# Patient Record
Sex: FEMALE | Race: WHITE | NOT HISPANIC OR LATINO | Employment: OTHER | URBAN - METROPOLITAN AREA
[De-identification: names, ages, dates, MRNs, and addresses within clinical notes are randomized per-mention and may not be internally consistent; named-entity substitution may affect disease eponyms.]

---

## 2019-11-09 ENCOUNTER — HOSPITAL ENCOUNTER (INPATIENT)
Facility: CLINIC | Age: 84
LOS: 3 days | Discharge: HOME | DRG: 193 | End: 2019-11-12
Attending: EMERGENCY MEDICINE | Admitting: INTERNAL MEDICINE
Payer: COMMERCIAL

## 2019-11-09 ENCOUNTER — APPOINTMENT (OUTPATIENT)
Dept: CT IMAGING | Facility: CLINIC | Age: 84
DRG: 193 | End: 2019-11-09
Attending: EMERGENCY MEDICINE
Payer: COMMERCIAL

## 2019-11-09 ENCOUNTER — APPOINTMENT (OUTPATIENT)
Dept: GENERAL RADIOLOGY | Facility: CLINIC | Age: 84
DRG: 193 | End: 2019-11-09
Attending: EMERGENCY MEDICINE
Payer: COMMERCIAL

## 2019-11-09 DIAGNOSIS — J18.9 PNEUMONIA OF LEFT LOWER LOBE DUE TO INFECTIOUS ORGANISM: ICD-10-CM

## 2019-11-09 DIAGNOSIS — N18.9 ACUTE RENAL FAILURE SUPERIMPOSED ON CHRONIC KIDNEY DISEASE, UNSPECIFIED CKD STAGE, UNSPECIFIED ACUTE RENAL FAILURE TYPE: ICD-10-CM

## 2019-11-09 DIAGNOSIS — E87.1 HYPONATREMIA: ICD-10-CM

## 2019-11-09 DIAGNOSIS — R19.7 DIARRHEA, UNSPECIFIED TYPE: ICD-10-CM

## 2019-11-09 DIAGNOSIS — N17.9 ACUTE RENAL FAILURE SUPERIMPOSED ON CHRONIC KIDNEY DISEASE, UNSPECIFIED CKD STAGE, UNSPECIFIED ACUTE RENAL FAILURE TYPE: ICD-10-CM

## 2019-11-09 DIAGNOSIS — J44.1 COPD EXACERBATION (H): Primary | ICD-10-CM

## 2019-11-09 DIAGNOSIS — R11.0 NAUSEA: ICD-10-CM

## 2019-11-09 LAB
ANION GAP SERPL CALCULATED.3IONS-SCNC: 8 MMOL/L (ref 3–14)
BASOPHILS # BLD AUTO: 0 10E9/L (ref 0–0.2)
BASOPHILS NFR BLD AUTO: 0.2 %
BUN SERPL-MCNC: 30 MG/DL (ref 7–30)
CALCIUM SERPL-MCNC: 7.7 MG/DL (ref 8.5–10.1)
CHLORIDE SERPL-SCNC: 94 MMOL/L (ref 94–109)
CO2 SERPL-SCNC: 24 MMOL/L (ref 20–32)
CREAT SERPL-MCNC: 1.95 MG/DL (ref 0.52–1.04)
DIFFERENTIAL METHOD BLD: ABNORMAL
EOSINOPHIL # BLD AUTO: 0 10E9/L (ref 0–0.7)
EOSINOPHIL NFR BLD AUTO: 0.2 %
ERYTHROCYTE [DISTWIDTH] IN BLOOD BY AUTOMATED COUNT: 13.4 % (ref 10–15)
GFR SERPL CREATININE-BSD FRML MDRD: 22 ML/MIN/{1.73_M2}
GLUCOSE SERPL-MCNC: 113 MG/DL (ref 70–99)
HCT VFR BLD AUTO: 27.6 % (ref 35–47)
HGB BLD-MCNC: 9.4 G/DL (ref 11.7–15.7)
IMM GRANULOCYTES # BLD: 0.1 10E9/L (ref 0–0.4)
IMM GRANULOCYTES NFR BLD: 0.6 %
LYMPHOCYTES # BLD AUTO: 0.6 10E9/L (ref 0.8–5.3)
LYMPHOCYTES NFR BLD AUTO: 3.3 %
MCH RBC QN AUTO: 30.4 PG (ref 26.5–33)
MCHC RBC AUTO-ENTMCNC: 34.1 G/DL (ref 31.5–36.5)
MCV RBC AUTO: 89 FL (ref 78–100)
MONOCYTES # BLD AUTO: 1.2 10E9/L (ref 0–1.3)
MONOCYTES NFR BLD AUTO: 6.7 %
NEUTROPHILS # BLD AUTO: 16 10E9/L (ref 1.6–8.3)
NEUTROPHILS NFR BLD AUTO: 89 %
NRBC # BLD AUTO: 0 10*3/UL
NRBC BLD AUTO-RTO: 0 /100
PLATELET # BLD AUTO: 96 10E9/L (ref 150–450)
POTASSIUM SERPL-SCNC: 4 MMOL/L (ref 3.4–5.3)
RBC # BLD AUTO: 3.09 10E12/L (ref 3.8–5.2)
SODIUM SERPL-SCNC: 126 MMOL/L (ref 133–144)
WBC # BLD AUTO: 18 10E9/L (ref 4–11)

## 2019-11-09 PROCEDURE — 25000128 H RX IP 250 OP 636: Performed by: INTERNAL MEDICINE

## 2019-11-09 PROCEDURE — 40000275 ZZH STATISTIC RCP TIME EA 10 MIN

## 2019-11-09 PROCEDURE — 25000128 H RX IP 250 OP 636: Performed by: EMERGENCY MEDICINE

## 2019-11-09 PROCEDURE — 25000125 ZZHC RX 250: Performed by: INTERNAL MEDICINE

## 2019-11-09 PROCEDURE — 71250 CT THORAX DX C-: CPT

## 2019-11-09 PROCEDURE — 99223 1ST HOSP IP/OBS HIGH 75: CPT | Mod: AI | Performed by: INTERNAL MEDICINE

## 2019-11-09 PROCEDURE — 25000125 ZZHC RX 250: Performed by: EMERGENCY MEDICINE

## 2019-11-09 PROCEDURE — 99285 EMERGENCY DEPT VISIT HI MDM: CPT | Mod: 25

## 2019-11-09 PROCEDURE — 96365 THER/PROPH/DIAG IV INF INIT: CPT

## 2019-11-09 PROCEDURE — 25800030 ZZH RX IP 258 OP 636: Performed by: EMERGENCY MEDICINE

## 2019-11-09 PROCEDURE — 40000274 ZZH STATISTIC RCP CONSULT EA 30 MIN

## 2019-11-09 PROCEDURE — 96375 TX/PRO/DX INJ NEW DRUG ADDON: CPT

## 2019-11-09 PROCEDURE — 94640 AIRWAY INHALATION TREATMENT: CPT | Mod: 76

## 2019-11-09 PROCEDURE — 85025 COMPLETE CBC W/AUTO DIFF WBC: CPT | Performed by: EMERGENCY MEDICINE

## 2019-11-09 PROCEDURE — 25000132 ZZH RX MED GY IP 250 OP 250 PS 637: Performed by: EMERGENCY MEDICINE

## 2019-11-09 PROCEDURE — 36415 COLL VENOUS BLD VENIPUNCTURE: CPT | Performed by: EMERGENCY MEDICINE

## 2019-11-09 PROCEDURE — 94640 AIRWAY INHALATION TREATMENT: CPT

## 2019-11-09 PROCEDURE — 25000132 ZZH RX MED GY IP 250 OP 250 PS 637: Performed by: INTERNAL MEDICINE

## 2019-11-09 PROCEDURE — 71046 X-RAY EXAM CHEST 2 VIEWS: CPT

## 2019-11-09 PROCEDURE — 25800030 ZZH RX IP 258 OP 636: Performed by: INTERNAL MEDICINE

## 2019-11-09 PROCEDURE — 96361 HYDRATE IV INFUSION ADD-ON: CPT

## 2019-11-09 PROCEDURE — 80048 BASIC METABOLIC PNL TOTAL CA: CPT | Performed by: EMERGENCY MEDICINE

## 2019-11-09 PROCEDURE — 87040 BLOOD CULTURE FOR BACTERIA: CPT | Performed by: EMERGENCY MEDICINE

## 2019-11-09 PROCEDURE — 12000000 ZZH R&B MED SURG/OB

## 2019-11-09 RX ORDER — METHYLPREDNISOLONE SODIUM SUCCINATE 40 MG/ML
40 INJECTION, POWDER, LYOPHILIZED, FOR SOLUTION INTRAMUSCULAR; INTRAVENOUS EVERY 8 HOURS
Status: COMPLETED | OUTPATIENT
Start: 2019-11-09 | End: 2019-11-10

## 2019-11-09 RX ORDER — ATORVASTATIN CALCIUM 40 MG/1
40 TABLET, FILM COATED ORAL DAILY
Status: ON HOLD | COMMUNITY
End: 2023-01-01

## 2019-11-09 RX ORDER — SERTRALINE HYDROCHLORIDE 100 MG/1
100 TABLET, FILM COATED ORAL DAILY
Status: DISCONTINUED | OUTPATIENT
Start: 2019-11-10 | End: 2019-11-12 | Stop reason: HOSPADM

## 2019-11-09 RX ORDER — AMLODIPINE BESYLATE 5 MG/1
5 TABLET ORAL DAILY
Status: DISCONTINUED | OUTPATIENT
Start: 2019-11-10 | End: 2019-11-12 | Stop reason: HOSPADM

## 2019-11-09 RX ORDER — LOSARTAN POTASSIUM 100 MG/1
100 TABLET ORAL DAILY
Status: DISCONTINUED | OUTPATIENT
Start: 2019-11-10 | End: 2019-11-12 | Stop reason: HOSPADM

## 2019-11-09 RX ORDER — LIDOCAINE 40 MG/G
CREAM TOPICAL
Status: DISCONTINUED | OUTPATIENT
Start: 2019-11-09 | End: 2019-11-12 | Stop reason: HOSPADM

## 2019-11-09 RX ORDER — LACTOBACILLUS RHAMNOSUS GG 10B CELL
1 CAPSULE ORAL 2 TIMES DAILY
Status: DISCONTINUED | OUTPATIENT
Start: 2019-11-09 | End: 2019-11-12 | Stop reason: HOSPADM

## 2019-11-09 RX ORDER — ONDANSETRON 2 MG/ML
4 INJECTION INTRAMUSCULAR; INTRAVENOUS ONCE
Status: COMPLETED | OUTPATIENT
Start: 2019-11-09 | End: 2019-11-09

## 2019-11-09 RX ORDER — IPRATROPIUM BROMIDE AND ALBUTEROL SULFATE 2.5; .5 MG/3ML; MG/3ML
3 SOLUTION RESPIRATORY (INHALATION)
Status: DISCONTINUED | OUTPATIENT
Start: 2019-11-09 | End: 2019-11-12 | Stop reason: HOSPADM

## 2019-11-09 RX ORDER — CEFTRIAXONE 1 G/1
1 INJECTION, POWDER, FOR SOLUTION INTRAMUSCULAR; INTRAVENOUS EVERY 24 HOURS
Status: DISCONTINUED | OUTPATIENT
Start: 2019-11-10 | End: 2019-11-12 | Stop reason: HOSPADM

## 2019-11-09 RX ORDER — BUDESONIDE 0.5 MG/2ML
0.5 INHALANT ORAL EVERY 12 HOURS
Status: ON HOLD | COMMUNITY
End: 2019-11-12

## 2019-11-09 RX ORDER — CARVEDILOL 25 MG/1
25 TABLET ORAL 2 TIMES DAILY WITH MEALS
Status: DISCONTINUED | OUTPATIENT
Start: 2019-11-09 | End: 2019-11-12 | Stop reason: HOSPADM

## 2019-11-09 RX ORDER — PANTOPRAZOLE SODIUM 40 MG/1
40 TABLET, DELAYED RELEASE ORAL 2 TIMES DAILY
Status: DISCONTINUED | OUTPATIENT
Start: 2019-11-09 | End: 2019-11-12 | Stop reason: HOSPADM

## 2019-11-09 RX ORDER — GABAPENTIN 100 MG/1
100 CAPSULE ORAL 4 TIMES DAILY
Status: DISCONTINUED | OUTPATIENT
Start: 2019-11-09 | End: 2019-11-12 | Stop reason: HOSPADM

## 2019-11-09 RX ORDER — IPRATROPIUM BROMIDE AND ALBUTEROL SULFATE 2.5; .5 MG/3ML; MG/3ML
3 SOLUTION RESPIRATORY (INHALATION) ONCE
Status: COMPLETED | OUTPATIENT
Start: 2019-11-09 | End: 2019-11-09

## 2019-11-09 RX ORDER — SODIUM CHLORIDE 9 MG/ML
INJECTION, SOLUTION INTRAVENOUS CONTINUOUS
Status: DISCONTINUED | OUTPATIENT
Start: 2019-11-09 | End: 2019-11-10

## 2019-11-09 RX ORDER — ONDANSETRON 4 MG/1
4 TABLET, ORALLY DISINTEGRATING ORAL EVERY 6 HOURS PRN
Status: DISCONTINUED | OUTPATIENT
Start: 2019-11-09 | End: 2019-11-12 | Stop reason: HOSPADM

## 2019-11-09 RX ORDER — ATORVASTATIN CALCIUM 40 MG/1
40 TABLET, FILM COATED ORAL DAILY
Status: DISCONTINUED | OUTPATIENT
Start: 2019-11-10 | End: 2019-11-12 | Stop reason: HOSPADM

## 2019-11-09 RX ORDER — AMLODIPINE BESYLATE 5 MG/1
5 TABLET ORAL DAILY
Status: ON HOLD | COMMUNITY
End: 2022-04-23

## 2019-11-09 RX ORDER — ACETAMINOPHEN 325 MG/1
650 TABLET ORAL ONCE
Status: COMPLETED | OUTPATIENT
Start: 2019-11-09 | End: 2019-11-09

## 2019-11-09 RX ORDER — IBUPROFEN 600 MG/1
600 TABLET, FILM COATED ORAL EVERY 6 HOURS PRN
Status: DISCONTINUED | OUTPATIENT
Start: 2019-11-09 | End: 2019-11-12 | Stop reason: HOSPADM

## 2019-11-09 RX ORDER — NALOXONE HYDROCHLORIDE 0.4 MG/ML
.1-.4 INJECTION, SOLUTION INTRAMUSCULAR; INTRAVENOUS; SUBCUTANEOUS
Status: DISCONTINUED | OUTPATIENT
Start: 2019-11-09 | End: 2019-11-12 | Stop reason: HOSPADM

## 2019-11-09 RX ORDER — CEFTRIAXONE 2 G/1
2 INJECTION, POWDER, FOR SOLUTION INTRAMUSCULAR; INTRAVENOUS ONCE
Status: COMPLETED | OUTPATIENT
Start: 2019-11-09 | End: 2019-11-09

## 2019-11-09 RX ORDER — ASPIRIN 81 MG/1
81 TABLET ORAL DAILY
Status: DISCONTINUED | OUTPATIENT
Start: 2019-11-10 | End: 2019-11-12 | Stop reason: HOSPADM

## 2019-11-09 RX ORDER — ALBUTEROL SULFATE 90 UG/1
2 AEROSOL, METERED RESPIRATORY (INHALATION) EVERY 6 HOURS
COMMUNITY

## 2019-11-09 RX ORDER — ONDANSETRON 2 MG/ML
4 INJECTION INTRAMUSCULAR; INTRAVENOUS EVERY 6 HOURS PRN
Status: DISCONTINUED | OUTPATIENT
Start: 2019-11-09 | End: 2019-11-12 | Stop reason: HOSPADM

## 2019-11-09 RX ORDER — LANOLIN ALCOHOL/MO/W.PET/CERES
400 CREAM (GRAM) TOPICAL DAILY
Status: DISCONTINUED | OUTPATIENT
Start: 2019-11-09 | End: 2019-11-12 | Stop reason: HOSPADM

## 2019-11-09 RX ORDER — ACETAMINOPHEN 325 MG/1
650 TABLET ORAL EVERY 4 HOURS PRN
Status: DISCONTINUED | OUTPATIENT
Start: 2019-11-09 | End: 2019-11-12 | Stop reason: HOSPADM

## 2019-11-09 RX ADMIN — Medication 1 CAPSULE: at 21:12

## 2019-11-09 RX ADMIN — CEFTRIAXONE 2 G: 2 INJECTION, POWDER, FOR SOLUTION INTRAMUSCULAR; INTRAVENOUS at 13:39

## 2019-11-09 RX ADMIN — ACETAMINOPHEN 650 MG: 325 TABLET, FILM COATED ORAL at 14:25

## 2019-11-09 RX ADMIN — FOLIC ACID TAB 400 MCG 400 MCG: 400 TAB at 17:02

## 2019-11-09 RX ADMIN — METHYLPREDNISOLONE SODIUM SUCCINATE 40 MG: 40 INJECTION, POWDER, FOR SOLUTION INTRAMUSCULAR; INTRAVENOUS at 23:56

## 2019-11-09 RX ADMIN — ACETAMINOPHEN 650 MG: 325 TABLET, FILM COATED ORAL at 21:12

## 2019-11-09 RX ADMIN — METHYLPREDNISOLONE SODIUM SUCCINATE 40 MG: 40 INJECTION, POWDER, FOR SOLUTION INTRAMUSCULAR; INTRAVENOUS at 17:03

## 2019-11-09 RX ADMIN — PANTOPRAZOLE SODIUM 40 MG: 40 TABLET, DELAYED RELEASE ORAL at 21:12

## 2019-11-09 RX ADMIN — GABAPENTIN 100 MG: 100 CAPSULE ORAL at 18:04

## 2019-11-09 RX ADMIN — SODIUM CHLORIDE 500 ML: 9 INJECTION, SOLUTION INTRAVENOUS at 10:56

## 2019-11-09 RX ADMIN — ONDANSETRON HYDROCHLORIDE 4 MG: 2 INJECTION, SOLUTION INTRAMUSCULAR; INTRAVENOUS at 14:25

## 2019-11-09 RX ADMIN — SODIUM CHLORIDE: 9 INJECTION, SOLUTION INTRAVENOUS at 16:06

## 2019-11-09 RX ADMIN — GABAPENTIN 100 MG: 100 CAPSULE ORAL at 21:12

## 2019-11-09 RX ADMIN — IPRATROPIUM BROMIDE AND ALBUTEROL SULFATE 3 ML: .5; 3 SOLUTION RESPIRATORY (INHALATION) at 16:18

## 2019-11-09 RX ADMIN — IPRATROPIUM BROMIDE AND ALBUTEROL SULFATE 3 ML: .5; 3 SOLUTION RESPIRATORY (INHALATION) at 19:32

## 2019-11-09 RX ADMIN — CARVEDILOL 25 MG: 25 TABLET, FILM COATED ORAL at 18:05

## 2019-11-09 RX ADMIN — GUAIFENESIN 10 ML: 100 SOLUTION ORAL at 21:12

## 2019-11-09 RX ADMIN — IPRATROPIUM BROMIDE AND ALBUTEROL SULFATE 3 ML: .5; 3 SOLUTION RESPIRATORY (INHALATION) at 10:56

## 2019-11-09 RX ADMIN — AZITHROMYCIN MONOHYDRATE 500 MG: 500 INJECTION, POWDER, LYOPHILIZED, FOR SOLUTION INTRAVENOUS at 13:39

## 2019-11-09 ASSESSMENT — ACTIVITIES OF DAILY LIVING (ADL)
COGNITION: 0 - NO COGNITION ISSUES REPORTED
ADLS_ACUITY_SCORE: 11
TOILETING: 0-->INDEPENDENT
AMBULATION: 0-->INDEPENDENT
NUMBER_OF_TIMES_PATIENT_HAS_FALLEN_WITHIN_LAST_SIX_MONTHS: 2
TRANSFERRING: 0-->INDEPENDENT
SWALLOWING: 0-->SWALLOWS FOODS/LIQUIDS WITHOUT DIFFICULTY
DRESS: 0-->INDEPENDENT
RETIRED_COMMUNICATION: 0-->UNDERSTANDS/COMMUNICATES WITHOUT DIFFICULTY
ADLS_ACUITY_SCORE: 16
RETIRED_EATING: 0-->INDEPENDENT
FALL_HISTORY_WITHIN_LAST_SIX_MONTHS: YES
BATHING: 0-->INDEPENDENT

## 2019-11-09 ASSESSMENT — ENCOUNTER SYMPTOMS
NAUSEA: 1
VOMITING: 1
BLOOD IN STOOL: 0
DIARRHEA: 1
FEVER: 0

## 2019-11-09 ASSESSMENT — MIFFLIN-ST. JEOR: SCORE: 959.75

## 2019-11-09 NOTE — PHARMACY-ADMISSION MEDICATION HISTORY
Admission medication history interview status for this patient is complete. See [facility] admission navigator for allergy information, prior to admission medications and immunization status.     Medication history interview source(s):Patient + patient's daughter  Medication history resources (including written lists, pill bottles, clinic record): med list   Primary pharmacy: [facility] (also does pill-pack mail order)    Changes made to PTA medication list:  Added: albuterol, ellipta, calcium + vitamin d  Deleted: benzonatateiron, vitamin d  Changed: sertraline  Dose, losartan dose, gabapentin, atorvastatin, aspirin, amlodipine doses    Actions taken by pharmacist (provider contacted, etc): None    Additional medication history information: Patient had all morning meds but threw up afterward.     Medication reconciliation/reorder completed by provider prior to medication history?  No    Do you take OTC medications (eg tylenol, ibuprofen, fish oil, eye/ear drops, etc)? Yes     For patients on insulin therapy: No      Prior to Admission medications    Medication Sig Last Dose Taking? Auth Provider   albuterol (PROAIR HFA/PROVENTIL HFA/VENTOLIN HFA) 108 (90 Base) MCG/ACT inhaler Inhale 2 puffs into the lungs every 6 hours 11/8/2019 at Unknown time Yes Unknown, Entered By History   amLODIPine (NORVASC) 5 MG tablet Take 5 mg by mouth daily 11/9/2019 at am Yes Unknown, Entered By History   aspirin (ASA) 81 MG tablet Take 81 mg by mouth daily 11/9/2019 at am Yes Unknown, Entered By History   atorvastatin (LIPITOR) 40 MG tablet Take 40 mg by mouth daily 11/9/2019 at am Yes Unknown, Entered By History   budesonide (PULMICORT) 0.5 MG/2ML neb solution Take 0.5 mg by nebulization every 12 hours 11/8/2019 at Unknown time Yes Unknown, Entered By History   CALCIUM-VITAMIN D PO Take 1 tablet by mouth daily  at am Yes Unknown, Entered By History   carvedilol (COREG) 25 MG tablet Take 1 tablet (25 mg) by mouth 2 times daily  (with meals) 11/9/2019 at 1x Yes Wellington Pepper MD   fluticasone (ARNUITY ELLIPTA) 200 MCG/ACT inhaler Inhale 1 puff into the lungs daily 11/8/2019 at Unknown time Yes Unknown, Entered By History   folic acid (FOLVITE) 400 MCG tablet Take 400 mcg by mouth daily  11/8/2019 at Unknown time Yes Unknown, Entered By History   gabapentin (NEURONTIN) 100 MG capsule Take 100 mg by mouth 4 times daily  11/9/2019 at 1x Yes Reported, Patient   glucosamine-chondroitin 500-400 MG CAPS Take 1 capsule by mouth 2 times daily. 11/9/2019 at 1x Yes Unknown, Entered By History   losartan (COZAAR) 50 MG tablet Take 100 mg by mouth daily  11/9/2019 at am Yes Unknown, Entered By History   pantoprazole (PROTONIX) 40 MG enteric coated tablet Take 40 mg by mouth 2 times daily  11/8/2019 at 1x Yes Tamiko Fall MD   sertraline (ZOLOFT) 100 MG tablet Take 100 mg by mouth daily  11/9/2019 at am Yes Reported, Patient

## 2019-11-09 NOTE — ED NOTES
[facility]  ED Nurse Handoff Report    Inez Collier is a 87 year old female   ED Chief complaint: Nasal Congestion; Vomiting; and Diarrhea  . ED Diagnosis:   Final diagnoses:   Hyponatremia   Pneumonia of left lower lobe due to infectious organism (H)   Acute renal failure superimposed on chronic kidney disease, unspecified CKD stage, unspecified acute renal failure type (H)   Diarrhea, unspecified type     Allergies:   Allergies   Allergen Reactions     Lisinopril      Morphine Hcl        Code Status: Full Code  Activity level - Baseline/Home:  Independent. Activity Level - Current:   Stand by Assist. Lift room needed: No. Bariatric: No   Needed: No   Isolation: Yes. Infection: Not Applicable  Rule out c-diff.     Vital Signs:   Vitals:    11/09/19 1215 11/09/19 1230 11/09/19 1245 11/09/19 1315   BP: 120/63 127/72 135/62 135/76   Pulse: 75 74 73 75   Resp:       Temp:       TempSrc:       SpO2:       Weight:           Cardiac Rhythm:  ,      Pain level: 0-10 Pain Scale: 0  Patient confused: No. Patient Falls Risk: Yes.   Elimination Status: Has voided   Patient Report - Initial Complaint: Congestion, vomiting and diarrhea. Focused Assessment: HPI   Inez Collier is a 87 year old female, with a history of TIA, asthma, CKD, hypertension, and hyperlipidemia, who presents with her daughter to the ED for evaluation of nausea, vomiting, and diarrhea. The patient reports she developed bronchitis symptoms beginning in September (2 months ago). She has been coughing up yellow/green sputum since. She does have abdominal pain from the coughing. She was on 2 course of antibiotics, Azithromycin and Doxycycline. She finished her 10 day course of Doxycycline along with Prednisone approximately 2 weeks ago. Her symptoms did not improve with the antibiotics. She was evaluated both times at urgent care and has not follow-up with her PCP regarding her sinus/nasal congestion. The patient notes she only drank  water this morning and subsequently vomited. She has been having dry heaves since. She as well has been having nonbloody diarrhea over the past 5 days. She has about x3-5 stools per day, usually in the morning. The patient denies any fever, urinary symptoms, or other symptoms/complaints.     Tests Performed: labs/cultures/imaging. Abnormal Results:   Labs Ordered and Resulted from Time of ED Arrival Up to the Time of Departure from the ED   BASIC METABOLIC PANEL - Abnormal; Notable for the following components:       Result Value    Sodium 126 (*)     Glucose 113 (*)     Creatinine 1.95 (*)     GFR Estimate 22 (*)     GFR Estimate If Black 26 (*)     Calcium 7.7 (*)     All other components within normal limits   CBC WITH PLATELETS DIFFERENTIAL - Abnormal; Notable for the following components:    WBC 18.0 (*)     RBC Count 3.09 (*)     Hemoglobin 9.4 (*)     Hematocrit 27.6 (*)     Platelet Count 96 (*)     Absolute Neutrophil 16.0 (*)     Absolute Lymphocytes 0.6 (*)     All other components within normal limits   BLOOD CULTURE   BLOOD CULTURE   CLOSTRIDIUM DIFFICILE TOXIN B   CT CHEST WITHOUT CONTRAST  11/9/2019 1:08 PM  IMPRESSION:    1. Focal airspace consolidation in the left lower lobe concerning for  pneumonia.  2. Scattered tree-in-bud nodularity within the left upper and right  middle lobes suspicious for bronchiolitis.  3. Scattered small pulmonary nodules measuring up to 3 mm in the left  lower lobe. Follow guidelines below.  4. 1.1 cm hypoattenuating nodule in the right lobe of the thyroid  gland. Suggest follow-up exam with nonemergent thyroid ultrasound.  .   Treatments provided: IVF, IV ABX,   Family Comments: Daughter at bedside  OBS brochure/video discussed/provided to patient:  No  ED Medications:   Medications   azithromycin (ZITHROMAX) 500 mg in sodium chloride 0.9 % 250 mL intermittent infusion (500 mg Intravenous New Bag 11/9/19 7837)   0.9% sodium chloride BOLUS (0 mLs Intravenous Stopped  11/9/19 1340)   ipratropium - albuterol 0.5 mg/2.5 mg/3 mL (DUONEB) neb solution 3 mL (3 mLs Nebulization Given 11/9/19 1056)   cefTRIAXone (ROCEPHIN) 2 g vial to attach to  ml bag for ADULTS or NS 50 ml bag for PEDS (0 g Intravenous Stopped 11/9/19 1350)     Drips infusing:  Yes  For the majority of the shift, the patient's behavior Green. Interventions performed were n/a.     Severe Sepsis OR Septic Shock Diagnosis Present: No      ED Nurse Name/Phone Number: Ravinder Dobson RN,   2:08 PM  RECEIVING UNIT ED HANDOFF REVIEW    Above ED Nurse Handoff Report was reviewed: Yes  Reviewed by: Annika Bauman RN on November 9, 2019 at 2:57 PM

## 2019-11-09 NOTE — ED TRIAGE NOTES
"Patient reports sinus and nasal congestion since late September, started antibiotics then. She went back in and got another round of antibiotics and prednisone. Now with continued nasal and sinus congestion as well as nausea, vomiting once this morning and diarrhea since last Monday, usually just in the morning about 3-5 stools a day with some incontinence\". Also with cough with yellow/green sputum. History of asthma.   "

## 2019-11-09 NOTE — LETTER
Transition Communication Hand-off for Care Transitions to Next Level of Care Provider    Name: Inez Collier  : 1932  MRN #: [id number]  Primary Care Provider: JEWELL GALLAGHER  Primary Care MD Name: Dr. Jewell Gallagher  Primary Clinic: [facility] [address]   [place] [address]  Primary Care Clinic Name: [facility] [place]  Reason for Hospitalization:  Hyponatremia [E87.1]  Pneumonia of left lower lobe due to infectious organism (H) [J18.1]  Diarrhea, unspecified type [R19.7]  Acute renal failure superimposed on chronic kidney disease, unspecified CKD stage, unspecified acute renal failure type (H) [N17.9, N18.9]  Admit Date/Time: 2019 10:20 AM  Discharge Date:   Payor Source: Payor: HU[employer]NA / Plan: HU[employer] MEDICARE ADVANTAGE / Product Type: Medicare /     Readmission Assessment Measure (KENTON) Risk Score/category: Average         Reason for Communication Hand-off Referral: Admission diagnoses: PN  Admission diagnoses: COPD    Discharge Plan: home    Discharge Needs Assessment:  Needs      Most Recent Value   Anticipated Changes Related to Illness  none   # of Referrals Placed by CTS  External Care Coordination, Communication hand-offs to next level of Care Providers   Other Resources  Other (see comment) [COPD & PNA Action Plans discussed w/pt at bedside. ]        Follow-up plan:  No future appointments.        Key Recommendations:  Patient was a smoker but quit in . She follows with Dr. Best (Pulmonology thru [facility]). She knows to avoid sick contacts & watches for environmental factors that can exacerbate her COPD. Discussed the importance of completing the course of antibiotics after discharge.     Pt was dc'd home with new meds for zithromax, ceftin, prednisone, zofran and cough medicine.    She should follow up as scheduled with recommended BMP.    Kait Dobson, RN, BSN, CPHN, CM    AVS/Discharge Summary is the source of truth; this is a helpful guide for  improved communication of patient story

## 2019-11-09 NOTE — H&P
[facility]    History and Physical - Hospitalist Service       Date of Admission:  11/9/2019    Assessment & Plan   Inez Collier is a 87 year old female admitted on 11/9/2019. She presents with cough , chills, SOB, recurrent after being treated with Z pack and Doxycycline starting in September.   Patient has history of COPD. Former smoker. On inhaled steroids and bronchodilators. Found to have LLL infiltrate on CT scan of the lungs. Has leucocytosis and hyponatremia. Has had diarrheal stools. Pending assessment for C diff toxin. Admitted for antibiotic treatment and IVF.     1. LLL pneumonia - started on Ceftriaxone and Zithromax IV for CAP, pending blood cultures. Continue with antibiotic treatment.   Oxygen to maintain O2 sats over 92 %.  Symptomatic mucolytic and fluids     2. COPD exacerbation related to pneumonitis   Start on Duonebs, IV Methylprednisolone ,once tapered down restart inhaled steroids   insentive spirometry     3. Hyponatremia - related to pneumonia, dehydration, diarrhea  Start on IVF, monitor electrolytes     4. Diarrhea - recent antibiotics , concern for C diff, assess stool for C diff toxin, contact isolation, IVF    4. CKD - IVF, monitor     5. HTN- continue PTA treatment, BP is controlled     6. Hyperlipidemia - continue on statin        Diet: regular diet   DVT Prophylaxis: Pneumatic Compression Devices  Parson Catheter: not present  Code Status: Full     Disposition Plan   Expected discharge: 2 - 3 days, recommended to prior living arrangement once antibiotic plan established and hemoglobin stable.  Entered: Tera Mao MD 11/09/2019, 3:23 PM     The patient's care was discussed with the Patient and Patient's Family.    Tera Mao MD  [facility]    ______________________________________________________________________    Chief Complaint   Productive cough and wheezing, mild SOB, diarrhea.     History is obtained from the patient    History of  Present Illness   Inez Collier is a 87 year old female who presents with productive cough, for over a month. Has yellow green phlegm, feels chills on and off. Has mild SOB related to the cough.   Has been on 2 antibiotic courses - Z pack and Doxycycline. Symptoms have not improved. No chest pain. Able to eat and ambulate.   Has h/o COPD, remote smoking history. Has been on Albuterol and Prednisone. Uses inhaled steroids as well.   Past 2 weeks has developed diarrheal stools, 3-5 loose to water stools daily. Has history of colitis. Not on treatment currently. No nausea, vomiting or abdominal pain.   Feels fatigues, no fainting symptoms.   In ED lab work showed hyponatremia. Chest CT with LLL infiltrate and patient was started on IV Ceftriaxone and IV Zithromax , after cultures obtained.   I was asked to admit her for inpatient treatment.     Review of Systems    The 10 point Review of Systems is negative other than noted in the HPI .    Past Medical History    I have reviewed this patient's medical history and updated it with pertinent information if needed.   Past Medical History:   Diagnosis Date     Asthma      Chronic kidney disease      Colitis      Hypertension        Past Surgical History   I have reviewed this patient's surgical history and updated it with pertinent information if needed.  Past Surgical History:   Procedure Laterality Date     GYN SURGERY      hysterectomy       Social History   I have reviewed this patient's social history and updated it with pertinent information if needed.  Social History     Tobacco Use     Smoking status: Former Smoker     Last attempt to quit: 1972     Years since quittin.8   Substance Use Topics     Alcohol use: No     Drug use: No       Family History   I have reviewed this patient's family history and updated it with pertinent information if needed.   Family History   Problem Relation Age of Onset     Coronary Artery Disease Brother      Colon Cancer Brother       Breast Cancer Sister        Prior to Admission Medications   Prior to Admission Medications   Prescriptions Last Dose Informant Patient Reported? Taking?   CALCIUM-VITAMIN D PO  at am  Yes Yes   Sig: Take 1 tablet by mouth daily   albuterol (PROAIR HFA/PROVENTIL HFA/VENTOLIN HFA) 108 (90 Base) MCG/ACT inhaler 11/8/2019 at Unknown time  Yes Yes   Sig: Inhale 2 puffs into the lungs every 6 hours   amLODIPine (NORVASC) 5 MG tablet 11/9/2019 at am  Yes Yes   Sig: Take 5 mg by mouth daily   aspirin (ASA) 81 MG tablet 11/9/2019 at am  Yes Yes   Sig: Take 81 mg by mouth daily   atorvastatin (LIPITOR) 40 MG tablet 11/9/2019 at am  Yes Yes   Sig: Take 40 mg by mouth daily   budesonide (PULMICORT) 0.5 MG/2ML neb solution 11/8/2019 at Unknown time  Yes Yes   Sig: Take 0.5 mg by nebulization every 12 hours   carvedilol (COREG) 25 MG tablet 11/9/2019 at 1x  No Yes   Sig: Take 1 tablet (25 mg) by mouth 2 times daily (with meals)   fluticasone (ARNUITY ELLIPTA) 200 MCG/ACT inhaler 11/8/2019 at Unknown time  Yes Yes   Sig: Inhale 1 puff into the lungs daily   folic acid (FOLVITE) 400 MCG tablet 11/8/2019 at Unknown time  Yes Yes   Sig: Take 400 mcg by mouth daily    gabapentin (NEURONTIN) 100 MG capsule 11/9/2019 at 1x  Yes Yes   Sig: Take 100 mg by mouth 4 times daily    glucosamine-chondroitin 500-400 MG CAPS 11/9/2019 at 1x  Yes Yes   Sig: Take 1 capsule by mouth 2 times daily.   losartan (COZAAR) 50 MG tablet 11/9/2019 at am  Yes Yes   Sig: Take 100 mg by mouth daily    pantoprazole (PROTONIX) 40 MG enteric coated tablet 11/8/2019 at 1x  Yes Yes   Sig: Take 40 mg by mouth 2 times daily    sertraline (ZOLOFT) 100 MG tablet 11/9/2019 at am  Yes Yes   Sig: Take 100 mg by mouth daily       Facility-Administered Medications: None     Allergies   Allergies   Allergen Reactions     Lisinopril      Morphine Hcl        Physical Exam   Vital Signs: Temp: 99  F (37.2  C) Temp src: Temporal BP: 135/76 Pulse: 75 Heart Rate: 77 Resp: 20  SpO2: 90 % O2 Device: None (Room air)    Weight: 135 lbs 0 oz    Constitutional: awake, alert, cooperative, no apparent distress, and appears stated age, coughing frequently   Eyes: Lids and lashes normal, pupils equal, round and reactive to light, extra ocular muscles intact, sclera clear, conjunctiva normal  ENT: Normocephalic, without obvious abnormality, atraumatic, sinuses nontender on palpation, external ears without lesions, oral pharynx with moist mucous membranes, tonsils without erythema or exudates, gums normal and good dentition.  Respiratory: no increased work of breathing, mild air exchange, no retractions and rhonchi diffuse, wheeze bilateral  Cardiovascular: Normal apical impulse, regular rate and rhythm, normal S1 and S2, no S3 or S4, and no murmur noted  GI: normal bowel sounds, soft, non-distended, non-tender, no masses palpated, no hepatosplenomegally  Skin: no bruising or bleeding and no rashes  Musculoskeletal: no lower extremity pitting edema present  motor strength is 5 out of 5 all extremities bilaterally  tone is normal  Neurologic: Awake, alert, oriented to name, place and time.    Motor is 5 out of 5 bilaterally.      Data   Data reviewed today: I reviewed all medications, new labs and imaging results over the last 24 hours.     Recent Labs   Lab 11/09/19  1055   WBC 18.0*   HGB 9.4*   MCV 89   PLT 96*   *   POTASSIUM 4.0   CHLORIDE 94   CO2 24   BUN 30   CR 1.95*   ANIONGAP 8   RIGO 7.7*   *     Recent Results (from the past 24 hour(s))   XR Chest 2 Views    Narrative    XR CHEST TWO VIEWS   11/9/2019 11:13 AM     HISTORY: Persistent cough x3 months, coarse lung sounds.    COMPARISON: 9/14/2016      Impression    IMPRESSION: No acute cardiopulmonary disease. Calcifications seen  overlying the aortic knob.    BRAULIO GORMAN MD   Chest CT w/o contrast    Narrative    CT CHEST WITHOUT CONTRAST  11/9/2019 1:08 PM    HISTORY: Cough, persistent. Rales. Three months of cough  with  leukocytosis.    TECHNIQUE:  Scans obtained from the apices through the diaphragm  without IV contrast. Radiation dose for this scan was reduced using  automated exposure control, adjustment of the mA and/or kV according  to patient size, or iterative reconstruction technique.    COMPARISON:  None.    FINDINGS:  Lungs: No pleural effusion or pneumothorax is seen. Mild subsegmental  atelectasis seen in the right lung base. Focal airspace consolidation  seen within the left lower lobe (series 4, image 37) focal tree-in-bud  nodularity also noted within the right middle and left upper lobes  (series 7, images 106 and 177). Multiple small pulmonary nodules are  present measuring up to 3 mm in the left lower lobe (series 7, image  157).    Additional findings: 1.1 cm hypoattenuating nodule noted within the  right lobe of the thyroid gland. No supraclavicular, axillary or  mediastinal lymphadenopathy seen. Heart size is at the upper limits of  normal. Multivessel coronary artery calcification seen. Trace  pericardial effusion is present. Scattered atherosclerotic  calcification seen in the thoracic aorta. Visualized portions of the  upper abdomen are unremarkable. No suspicious osseous lesions seen.  Diffuse osteopenia seen throughout the bones. Multilevel degenerative  change is seen in the spine.      Impression    IMPRESSION:    1. Focal airspace consolidation in the left lower lobe concerning for  pneumonia.  2. Scattered tree-in-bud nodularity within the left upper and right  middle lobes suspicious for bronchiolitis.  3. Scattered small pulmonary nodules measuring up to 3 mm in the left  lower lobe. Follow guidelines below.  4. 1.1 cm hypoattenuating nodule in the right lobe of the thyroid  gland. Suggest follow-up exam with nonemergent thyroid ultrasound.    Recommendations for one or multiple incidental lung nodules < 6mm :    Low risk patients: No routine follow-up.    High risk patients: Optional follow-up  CT at 12 months; if  unchanged, no further follow-up.    *Low Risk: Minimal or absent history of smoking or other known risk  factors.  *Nonsolid (ground glass) or partly solid nodules may require longer  follow-up to exclude indolent adenocarcinoma.  *Recommendations based on Guidelines for the Management of Incidental  Pulmonary Nodules Detected at CT: From the Fleischner Society 2017,  Radiology 2017.    BRAULIO GORMAN MD

## 2019-11-09 NOTE — PROGRESS NOTES
"[facility] RCAT     Date: 11/9/19    Admission Dx: Pneumonia    Pulmonary History: COPD, Former smoker    Home Nebulizer/MDI Use: Albuterol inhaler, Pulmicort neb, Arnuity inhaler     Home Oxygen: None    Acuity Level (RCAT flow sheet): 3    Aerosol Therapy initiated: Duoneb QID and Q4 prn      Pulmonary Hygiene initiated: Cough and deep breathing techniques      Volume Expansion initiated: Incentive Spirometry      Current Oxygen Requirements: 2L NC  Current SpO2: 95%    Re-evaluation date: 11/12/19    Patient Education: Talked with patient about RCAT protocol and medication benefits as well as side effects. Patient verbalizes understanding in regards to medication.      See \"RT Assessments\" flow sheet for patient assessment scoring and Acuity Level Details.             "

## 2019-11-09 NOTE — ED PROVIDER NOTES
History     Chief Complaint:  Nausea, Vomiting, & Diarrhea     HPI   Inez Collier is a 87 year old female, with a history of TIA, asthma, CKD, hypertension, and hyperlipidemia, who presents with her daughter to the ED for evaluation of nausea, vomiting, and diarrhea. The patient reports she developed bronchitis symptoms beginning in September (2 months ago). She has been coughing up yellow/green sputum since. She does have abdominal pain from the coughing. She was on 2 course of antibiotics, Azithromycin and Doxycycline. She finished her 10 day course of Doxycycline along with Prednisone approximately 2 weeks ago. Her symptoms did not improve with the antibiotics. She was evaluated both times at urgent care and has not follow-up with her PCP regarding her sinus/nasal congestion. The patient notes she only drank water this morning and subsequently vomited. She has been having dry heaves since. She as well has been having nonbloody diarrhea over the past 5 days. She has about x3-5 stools per day, usually in the morning. The patient denies any fever, urinary symptoms, or other symptoms/complaints.      Allergies:  Lisinopril: cough   Morphine: nausea & vomiting      Medications:    Amlodipine  Aspirin   Atorvastatin  Coreg  Iron  Gabapentin  Glucosamine-chondroitin   Losartan  Protonix  Zoloft  Vitamin D  Meclizine   Pulmicort inhaler   Arnuity Ellipta inhaler   Albuterol inhaler    Past Medical History:    UTI  HTN  CKD  Asthma    Anemia  GERD  Left carotid artery stenosis  Anxiety   SCC  TIA  Colon neoplasm  Chronic ulcerative proctitis   HLD  Colitis     Past Surgical History:    Hysterectomy   Ectopic pregnancy evacuation   Tubal ligation   Skin lesion excision     Family History:    CAD, heart disease, liver cancer, colon cancer: brother  Heart disease, AAA, Breast cancer, colon cancer: sister   Heart disease: father  Heart disease: mother    Social History:  Smoking status: Former smoker, Quit 1972  Alcohol  use: No  Presents to ED with daughter    Marital Status:   [5]     Review of Systems   Constitutional: Negative for fever.   HENT: Positive for congestion.    Gastrointestinal: Positive for diarrhea, nausea and vomiting. Negative for blood in stool.   All other systems reviewed and are negative.    Physical Exam     Patient Vitals for the past 24 hrs:   BP Temp Temp src Pulse Heart Rate Resp SpO2 Weight   11/09/19 1315 135/76 -- -- 75 -- -- -- --   11/09/19 1245 135/62 -- -- 73 -- -- -- --   11/09/19 1230 127/72 -- -- 74 -- -- -- --   11/09/19 1215 120/63 -- -- 75 -- -- -- --   11/09/19 1200 128/70 -- -- 77 -- -- -- --   11/09/19 1145 (!) 140/67 -- -- 74 -- -- -- --   11/09/19 1130 134/64 -- -- 75 -- -- -- --   11/09/19 1115 130/66 -- -- -- -- -- -- --   11/09/19 1100 134/70 -- -- 73 -- -- -- --   11/09/19 1014 134/69 99  F (37.2  C) Temporal 77 77 20 90 % 61.2 kg (135 lb)     Physical Exam  General: Alert, appears well-developed and well-nourished. Cooperative.     In mild distress  HEENT:  Head:  Atraumatic.  No sinus tenderness.  Ears:  External ears are normal  Mouth/Throat:  Oropharynx is without erythema or exudate and mucous membranes are moist.   Eyes:   Conjunctivae normal and EOM are normal. No scleral icterus.  Neck:   Normal range of motion. Neck supple.  CV:  Normal rate, regular rhythm, normal heart sounds and radial pulses are 2+ and symmetric.  No murmur.  Resp:  Breath sounds are coarse bilaterally, faint expiratory wheezing to left lower. Productive cough.    Non-labored, no retractions or accessory muscle use  GI:  Abdomen is soft, no distension, no tenderness. No rebound or guarding.  No CVA tenderness bilaterally  MS:  Normal range of motion. No edema.    Normal strength in all 4 extremities.     Back atraumatic.    No midline cervical, thoracic, or lumbar tenderness  Skin:  Warm and dry.  No rash or lesions noted.  Neuro: Alert. Normal strength.  GCS: 15  Psych:  Normal mood and  affect.  Lymph: No anterior or posterior cervical lymphadenopathy noted.    Emergency Department Course     Imaging:  Radiographic findings were communicated with the patient and family who voiced understanding of the findings.    XR Chest 2 Views  IMPRESSION: No acute cardiopulmonary disease. Calcifications seen  overlying the aortic knob. As read by Radiology.     Chest CT w/o Contrast  IMPRESSION:    1. Focal airspace consolidation in the left lower lobe concerning for pneumonia.  2. Scattered tree-in-bud nodularity within the left upper and right middle lobes suspicious for bronchiolitis.  3. Scattered small pulmonary nodules measuring up to 3 mm in the left lower lobe. Follow guidelines below.  4. 1.1 cm hypoattenuating nodule in the right lobe of the thyroid gland. Suggest follow-up exam with nonemergent thyroid ultrasound.  As read by Radiology.     Laboratory:  Laboratory findings were communicated with the patient and family who voiced understanding of the findings.    CBC: WBC 18.0(H), HGB 9.4(L), PLT 96(L)  BMP: (L), Glucose 113(H), GFR estimate 22(L), Calcium 7.7(L), Creatinine 1.95(H), o/w WNL     Blood cultures x2: In process     Interventions:  1056: NS 500mL Bolus IV  1056: Duoneb 3mLs Neb   1339: Rocephin 2g IV  1339: Azithromycin 500mg IV    Emergency Department Course:  Past medical records, nursing notes, and vitals reviewed.  1032: I performed an exam of the patient and obtained history, as documented above.    1055: IV inserted and blood drawn.    The patient was sent for a chest x-ray while in the emergency department, findings above.    1156: I rechecked the patient. Explained findings to patient and daughter.    The patient was sent for a chest CT while in the emergency department, findings above.    1313: I rechecked the patient. Explained findings to patient and daughter.    1339: I spoke to Dr. Mao of the hospitalist service who accepts the patient for admission.     Findings  and plan explained to the Patient and daughter who consents to admission. Discussed the patient with Dr. Mao, who will admit the patient to a med bed for further monitoring, evaluation, and treatment.    Impression & Plan      Medical Decision Making:  Patient is a 87-year-old female who presents with sinus, nasal congestion, and persistent cough with sputum production intermittently since late September.  Patient has been on multiple courses of antibiotics in recent months.  Of note, she recently completed Doxycycline antibiotic course and a course of Prednisone approximately 1 to 1-1/2 weeks ago.  Patient's presentation today concerning for persistent infectious source as her white count is elevated at 18, and she does appear to have a mild acute kidney injury superimposed on known chronic kidney disease.  Patient is mildly hyponatremic as well with a sodium of 126.  She's been complaining of some diarrhea in addition in the last 1 week and with recent antibiotic exposure concern for potential C. diff.  Patient has not been able to provide a stool sample here but has been having watery stools in recent days.  Will remain on enteric precautions until C. diff testing is completed.  Patient did ultimately have CT imaging as chest x-ray was unremarkable.  CT did confirm a focal airspace consolidation in the left lower lobe concerning for pneumonia.  Plan for antibiotic treatment with Ceftriaxone and Azithromycin at this time and inpatient hospitalization due to failure of outpatient treatment in recent months.  I discussed imaging and results at bedside with patient and family and they agreed to admission at this time.  Spoke with Dr. Mao who agreed to inpatient admission.    Diagnosis:    ICD-10-CM   1. Hyponatremia E87.1   2. Pneumonia of left lower lobe due to infectious organism (H) J18.1   3. Acute renal failure superimposed on chronic kidney disease, unspecified CKD stage, unspecified acute renal failure  type (H) N17.9    N18.9   4. Diarrhea, unspecified type R19.7     Disposition: Patient admitted to a [facility] bed by Dr. Daylin House  11/9/2019   [facility] EMERGENCY DEPARTMENT    Scribe Disclosure:  I, Karen House, am serving as a scribe at 10:32 AM on 11/9/2019 to document services personally performed by Balwinder Esquivel MD based on my observations and the provider's statements to me.        Balwinder Esquivel MD  11/09/19 [id number]

## 2019-11-09 NOTE — LETTER
Transition Communication Hand-off for Care Transitions to Next Level of Care Provider    Name: Inez Collier  : 1932  MRN #: [id number]    Key Recommendations:  Patient was a smoker but quit in . She follows with Dr. Best (Pulmonology thru [facility]). She knows to avoid sick contacts & watches for environmental factors that can exacerbate her COPD. Discussed the importance of completing the course of antibiotics after discharge.     Kait Dobson RN, BSN, CPHN, CM    AVS/Discharge Summary is the source of truth; this is a helpful guide for improved communication of patient story

## 2019-11-10 LAB
ALBUMIN SERPL-MCNC: 2.7 G/DL (ref 3.4–5)
ALP SERPL-CCNC: 45 U/L (ref 40–150)
ALT SERPL W P-5'-P-CCNC: 14 U/L (ref 0–50)
ANION GAP SERPL CALCULATED.3IONS-SCNC: 7 MMOL/L (ref 3–14)
AST SERPL W P-5'-P-CCNC: 39 U/L (ref 0–45)
BILIRUB SERPL-MCNC: 0.3 MG/DL (ref 0.2–1.3)
BUN SERPL-MCNC: 30 MG/DL (ref 7–30)
CALCIUM SERPL-MCNC: 7.5 MG/DL (ref 8.5–10.1)
CHLORIDE SERPL-SCNC: 101 MMOL/L (ref 94–109)
CO2 SERPL-SCNC: 22 MMOL/L (ref 20–32)
CREAT SERPL-MCNC: 1.6 MG/DL (ref 0.52–1.04)
ERYTHROCYTE [DISTWIDTH] IN BLOOD BY AUTOMATED COUNT: 13.2 % (ref 10–15)
GFR SERPL CREATININE-BSD FRML MDRD: 29 ML/MIN/{1.73_M2}
GLUCOSE SERPL-MCNC: 144 MG/DL (ref 70–99)
HCT VFR BLD AUTO: 27.6 % (ref 35–47)
HGB BLD-MCNC: 9 G/DL (ref 11.7–15.7)
MCH RBC QN AUTO: 29.7 PG (ref 26.5–33)
MCHC RBC AUTO-ENTMCNC: 32.6 G/DL (ref 31.5–36.5)
MCV RBC AUTO: 91 FL (ref 78–100)
PLATELET # BLD AUTO: 98 10E9/L (ref 150–450)
POTASSIUM SERPL-SCNC: 3.8 MMOL/L (ref 3.4–5.3)
PROT SERPL-MCNC: 6.1 G/DL (ref 6.8–8.8)
RBC # BLD AUTO: 3.03 10E12/L (ref 3.8–5.2)
SODIUM SERPL-SCNC: 130 MMOL/L (ref 133–144)
WBC # BLD AUTO: 10.6 10E9/L (ref 4–11)

## 2019-11-10 PROCEDURE — 94640 AIRWAY INHALATION TREATMENT: CPT | Mod: 76

## 2019-11-10 PROCEDURE — 99233 SBSQ HOSP IP/OBS HIGH 50: CPT | Performed by: INTERNAL MEDICINE

## 2019-11-10 PROCEDURE — 12000000 ZZH R&B MED SURG/OB

## 2019-11-10 PROCEDURE — 94640 AIRWAY INHALATION TREATMENT: CPT

## 2019-11-10 PROCEDURE — 25000132 ZZH RX MED GY IP 250 OP 250 PS 637: Performed by: INTERNAL MEDICINE

## 2019-11-10 PROCEDURE — 25000125 ZZHC RX 250: Performed by: INTERNAL MEDICINE

## 2019-11-10 PROCEDURE — 80053 COMPREHEN METABOLIC PANEL: CPT | Performed by: INTERNAL MEDICINE

## 2019-11-10 PROCEDURE — 40000275 ZZH STATISTIC RCP TIME EA 10 MIN

## 2019-11-10 PROCEDURE — 36415 COLL VENOUS BLD VENIPUNCTURE: CPT | Performed by: INTERNAL MEDICINE

## 2019-11-10 PROCEDURE — 85027 COMPLETE CBC AUTOMATED: CPT | Performed by: INTERNAL MEDICINE

## 2019-11-10 PROCEDURE — 25000131 ZZH RX MED GY IP 250 OP 636 PS 637: Performed by: INTERNAL MEDICINE

## 2019-11-10 PROCEDURE — 25000128 H RX IP 250 OP 636: Performed by: INTERNAL MEDICINE

## 2019-11-10 PROCEDURE — 25800030 ZZH RX IP 258 OP 636: Performed by: INTERNAL MEDICINE

## 2019-11-10 RX ORDER — PREDNISONE 20 MG/1
40 TABLET ORAL DAILY
Status: DISCONTINUED | OUTPATIENT
Start: 2019-11-10 | End: 2019-11-12 | Stop reason: HOSPADM

## 2019-11-10 RX ADMIN — OYSTER SHELL CALCIUM WITH VITAMIN D 1 TABLET: 500; 200 TABLET, FILM COATED ORAL at 08:55

## 2019-11-10 RX ADMIN — ACETAMINOPHEN 650 MG: 325 TABLET, FILM COATED ORAL at 21:17

## 2019-11-10 RX ADMIN — ACETAMINOPHEN 650 MG: 325 TABLET, FILM COATED ORAL at 16:17

## 2019-11-10 RX ADMIN — ACETAMINOPHEN 650 MG: 325 TABLET, FILM COATED ORAL at 06:29

## 2019-11-10 RX ADMIN — CARVEDILOL 25 MG: 25 TABLET, FILM COATED ORAL at 17:38

## 2019-11-10 RX ADMIN — GUAIFENESIN 10 ML: 100 SOLUTION ORAL at 21:17

## 2019-11-10 RX ADMIN — IPRATROPIUM BROMIDE AND ALBUTEROL SULFATE 3 ML: .5; 3 SOLUTION RESPIRATORY (INHALATION) at 19:55

## 2019-11-10 RX ADMIN — SERTRALINE HYDROCHLORIDE 100 MG: 100 TABLET ORAL at 08:55

## 2019-11-10 RX ADMIN — GABAPENTIN 100 MG: 100 CAPSULE ORAL at 17:37

## 2019-11-10 RX ADMIN — IPRATROPIUM BROMIDE AND ALBUTEROL SULFATE 3 ML: .5; 3 SOLUTION RESPIRATORY (INHALATION) at 11:58

## 2019-11-10 RX ADMIN — PREDNISONE 40 MG: 20 TABLET ORAL at 11:32

## 2019-11-10 RX ADMIN — Medication 1 CAPSULE: at 21:18

## 2019-11-10 RX ADMIN — CARVEDILOL 25 MG: 25 TABLET, FILM COATED ORAL at 08:55

## 2019-11-10 RX ADMIN — Medication 1 CAPSULE: at 08:55

## 2019-11-10 RX ADMIN — ATORVASTATIN CALCIUM 40 MG: 40 TABLET, FILM COATED ORAL at 08:54

## 2019-11-10 RX ADMIN — SODIUM CHLORIDE: 9 INJECTION, SOLUTION INTRAVENOUS at 01:49

## 2019-11-10 RX ADMIN — GABAPENTIN 100 MG: 100 CAPSULE ORAL at 13:12

## 2019-11-10 RX ADMIN — GABAPENTIN 100 MG: 100 CAPSULE ORAL at 08:54

## 2019-11-10 RX ADMIN — CEFTRIAXONE SODIUM 1 G: 1 INJECTION, POWDER, FOR SOLUTION INTRAMUSCULAR; INTRAVENOUS at 15:15

## 2019-11-10 RX ADMIN — PANTOPRAZOLE SODIUM 40 MG: 40 TABLET, DELAYED RELEASE ORAL at 21:18

## 2019-11-10 RX ADMIN — AZITHROMYCIN MONOHYDRATE 500 MG: 500 INJECTION, POWDER, LYOPHILIZED, FOR SOLUTION INTRAVENOUS at 14:04

## 2019-11-10 RX ADMIN — IPRATROPIUM BROMIDE AND ALBUTEROL SULFATE 3 ML: .5; 3 SOLUTION RESPIRATORY (INHALATION) at 08:04

## 2019-11-10 RX ADMIN — METHYLPREDNISOLONE SODIUM SUCCINATE 40 MG: 40 INJECTION, POWDER, FOR SOLUTION INTRAMUSCULAR; INTRAVENOUS at 08:51

## 2019-11-10 RX ADMIN — GUAIFENESIN 10 ML: 100 SOLUTION ORAL at 06:29

## 2019-11-10 RX ADMIN — ASPIRIN 81 MG: 81 TABLET, COATED ORAL at 08:54

## 2019-11-10 RX ADMIN — IPRATROPIUM BROMIDE AND ALBUTEROL SULFATE 3 ML: .5; 3 SOLUTION RESPIRATORY (INHALATION) at 16:41

## 2019-11-10 RX ADMIN — PANTOPRAZOLE SODIUM 40 MG: 40 TABLET, DELAYED RELEASE ORAL at 08:55

## 2019-11-10 RX ADMIN — FOLIC ACID TAB 400 MCG 400 MCG: 400 TAB at 08:55

## 2019-11-10 RX ADMIN — GABAPENTIN 100 MG: 100 CAPSULE ORAL at 21:18

## 2019-11-10 RX ADMIN — AMLODIPINE BESYLATE 5 MG: 5 TABLET ORAL at 08:54

## 2019-11-10 RX ADMIN — LOSARTAN POTASSIUM 100 MG: 100 TABLET ORAL at 08:56

## 2019-11-10 ASSESSMENT — ACTIVITIES OF DAILY LIVING (ADL)
ADLS_ACUITY_SCORE: 13
ADLS_ACUITY_SCORE: 13
ADLS_ACUITY_SCORE: 15
ADLS_ACUITY_SCORE: 15
ADLS_ACUITY_SCORE: 13
ADLS_ACUITY_SCORE: 13

## 2019-11-10 NOTE — PLAN OF CARE
Coughing less today. Lungs decreased bilat. 95% on room air. Up in halls with SBA .Ambulated in the halls 3 x with staff. Up in chair for meals. IV SL Tele ST. No BM in 48 hours. Incentive spirometer with reminders. Able to get to [phone].

## 2019-11-10 NOTE — PLAN OF CARE
Arrived from ER at 1515. Spastic productive cough. Lungs decreased bilat. SBA to Toilet. Skin intact. No BM since yesterday. Need stool sample. Denies Nausea. Ate lite meal of soup and tea. NS infusing at 100 ml/ hr. Tele SR.

## 2019-11-10 NOTE — PROGRESS NOTES
[facility] Progress Note  FR 3rd Floor    Attempted visit 2x today; pt occupied with other cares. SH will try again tomorrow.    Spencer Starks M.Div.  Staff   Pager: [phone]

## 2019-11-10 NOTE — PROGRESS NOTES
[facility]  Hospitalist Progress Note  Admit 11/9/2019 10:20 AM    Name: Inez Collier    MRN: [id number]  Provider:  Jermaine Cochran MD, [facility]    Date of Service: 11/10/2019     Reason for Stay (Diagnosis): Acute hypoxic respiratory failure secondary to left lower lobe pneumonia and COPD exacerbation         Summary of hospital stay & Assessment/Plan:   Summary of Stay: Inez Collier is a 87 year old female who was admitted on 11/9/2019    87 year old female admitted on 11/9/2019. She presents with cough , chills, SOB, recurrent after being treated with Z pack and Doxycycline starting in September. Patient has history of COPD. Former smoker. On inhaled steroids and bronchodilators. Found to have LLL infiltrate on CT scan of the lungs. Has leucocytosis and hyponatremia. Has had diarrheal stools.  She was hypoxic with oxygen saturation at 88% requiring oxygen supplementation      1.  Acute hypoxic respiratory failure secondary to LLL pneumonia and COPD exacerbation-fever 99.8, significant leukocytosis 18,000, hypoxia oxygen saturation 88% requiring 2 L supplementation, started on Ceftriaxone and Zithromax IV for CAP, pending blood cultures. Continue with antibiotic treatment.   Oxygen to maintain O2 sats over 92 %.  Symptomatic mucolytic and fluids      2. COPD exacerbation related to pneumonitis, CT scan showing Scattered tree-in-bud nodularity within the left upper and right middle lobes suspicious for bronchiolitis.  Start on Duonebs, IV Methylprednisolone , change IV steroid to oral prednisone and frequent nebulizer and oxygen as needed.  Insentive spirometry      3. Hyponatremia - related to pneumonia, dehydration, diarrhea  Improved, encourage oral intake, DC IV fluid     4. Diarrhea -no bowel movement since admission, DC enteric precaution    5.  Acute kidney injury on top of CKD 4 -improving, monitor      5. HTN- continue PTA treatment, BP is controlled      6. Hyperlipidemia - continue on statin      DVT Prophylaxis: Pneumatic Compression Devices  Code Status:  Full Code    Disposition Plan   Incidental findings/discharge issues needs follow-up on pulmonary nodule  Scattered small pulmonary nodules measuring up to 3 mm in the left  lower lobe. Follow up per guidelines in the report.  1.1 cm hypoattenuating nodule in the right lobe of the thyroid gland. Suggest follow-up exam with nonemergent thyroid ultrasound.    Expected discharge in 1-2 days to prior living arrangement once breathing improved, off oxygen, and tolerating minimal activity and ADLs.       Entered: Jermaine Cochran 11/10/2019, 10:12 AM                 Interval History:       Although feels some improvement since yesterday continues to have a congested cough, and short of breath with minimal activity.  Apparently has been treated with more than one course of antibiotic as outpatient.  She has not had a bowel movement since admission, diarrhea seems to be improved    Today's plan detailed above discussed with nursing               Physical Exam:   Physical Exam   Temp: 98.1  F (36.7  C) Temp src: Oral BP: 127/61 Pulse: 79 Heart Rate: 101 Resp: 20 SpO2: 93 % O2 Device: None (Room air) Oxygen Delivery: 2 LPM  Vitals:    11/09/19 1014 11/09/19 1543   Weight: 61.2 kg (135 lb) 63.5 kg (139 lb 15.9 oz)     I/O last 3 completed shifts:  In: -   Out: 1700 [Urine:1700]    GENERAL:  Comfortable.   PSYCH: pleasant, oriented, No acute distress.  EYES: PERRLA, Normal conjunctiva.  HEART:  Normal S1, S2 with no edema.  LUNGS: Scattered rhonchi and wheezes with decreased breath sounds more so with rales on the left lower lobe, normal Respiratory effort.  ABDOMEN:  Soft, no hepatosplenomegaly, normal bowel sounds.  SKIN:  Dry to touch, No rash.  Neuro: Non focal with normal motor power, sensation, CN's and Reflexes.    Medications       amLODIPine  5 mg Oral Daily     aspirin  81 mg Oral Daily     atorvastatin  40 mg Oral Daily     azithromycin  500 mg  Intravenous Q24H     calcium carbonate-vitamin D  1 tablet Oral Daily     carvedilol  25 mg Oral BID w/meals     cefTRIAXone  1 g Intravenous Q24H     folic acid  400 mcg Oral Daily     gabapentin  100 mg Oral 4x Daily     influenza Vac Split High-Dose  0.5 mL Intramuscular Prior to discharge     ipratropium - albuterol 0.5 mg/2.5 mg/3 mL  3 mL Nebulization 4x daily     lactobacillus rhamnosus (GG)  1 capsule Oral BID     losartan  100 mg Oral Daily     pantoprazole  40 mg Oral BID     predniSONE  40 mg Oral Daily     sertraline  100 mg Oral Daily     sodium chloride (PF)  3 mL Intracatheter Q8H     Data     -Data reviewed today:  I personally reviewed  all new labs and imaging results over the last 24 hours.    Recent Labs   Lab 11/10/19  0747 11/09/19  1055   WBC 10.6 18.0*   HGB 9.0* 9.4*   HCT 27.6* 27.6*   MCV 91 89   PLT 98* 96*     Recent Labs   Lab 11/10/19  0747 11/09/19  1055   * 126*   POTASSIUM 3.8 4.0   CHLORIDE 101 94   CO2 22 24   ANIONGAP 7 8   * 113*   BUN 30 30   CR 1.60* 1.95*   GFRESTIMATED 29* 22*   GFRESTBLACK 33* 26*   RIGO 7.5* 7.7*       Recent Results (from the past 24 hour(s))   XR Chest 2 Views    Narrative    XR CHEST TWO VIEWS   11/9/2019 11:13 AM     HISTORY: Persistent cough x3 months, coarse lung sounds.    COMPARISON: 9/14/2016      Impression    IMPRESSION: No acute cardiopulmonary disease. Calcifications seen  overlying the aortic knob.    BRAULIO GORMAN MD   Chest CT w/o contrast    Narrative    CT CHEST WITHOUT CONTRAST  11/9/2019 1:08 PM    HISTORY: Cough, persistent. Rales. Three months of cough with  leukocytosis.    TECHNIQUE:  Scans obtained from the apices through the diaphragm  without IV contrast. Radiation dose for this scan was reduced using  automated exposure control, adjustment of the mA and/or kV according  to patient size, or iterative reconstruction technique.    COMPARISON:  None.    FINDINGS:  Lungs: No pleural effusion or pneumothorax is seen. Mild  subsegmental  atelectasis seen in the right lung base. Focal airspace consolidation  seen within the left lower lobe (series 4, image 37) focal tree-in-bud  nodularity also noted within the right middle and left upper lobes  (series 7, images 106 and 177). Multiple small pulmonary nodules are  present measuring up to 3 mm in the left lower lobe (series 7, image  157).    Additional findings: 1.1 cm hypoattenuating nodule noted within the  right lobe of the thyroid gland. No supraclavicular, axillary or  mediastinal lymphadenopathy seen. Heart size is at the upper limits of  normal. Multivessel coronary artery calcification seen. Trace  pericardial effusion is present. Scattered atherosclerotic  calcification seen in the thoracic aorta. Visualized portions of the  upper abdomen are unremarkable. No suspicious osseous lesions seen.  Diffuse osteopenia seen throughout the bones. Multilevel degenerative  change is seen in the spine.      Impression    IMPRESSION:    1. Focal airspace consolidation in the left lower lobe concerning for  pneumonia.  2. Scattered tree-in-bud nodularity within the left upper and right  middle lobes suspicious for bronchiolitis.  3. Scattered small pulmonary nodules measuring up to 3 mm in the left  lower lobe. Follow guidelines below.  4. 1.1 cm hypoattenuating nodule in the right lobe of the thyroid  gland. Suggest follow-up exam with nonemergent thyroid ultrasound.    Recommendations for one or multiple incidental lung nodules < 6mm :    Low risk patients: No routine follow-up.    High risk patients: Optional follow-up CT at 12 months; if  unchanged, no further follow-up.    *Low Risk: Minimal or absent history of smoking or other known risk  factors.  *Nonsolid (ground glass) or partly solid nodules may require longer  follow-up to exclude indolent adenocarcinoma.  *Recommendations based on Guidelines for the Management of Incidental  Pulmonary Nodules Detected at CT: From the Fleischner  Society 2017,  Radiology 2017.    BRAULIO GORMAN MD       This document was produced using voice recognition software

## 2019-11-10 NOTE — PLAN OF CARE
A&Ox4. VSS on 2 LPM. Temp of 99.8 at start of shift but was given Tylenol and temp decreased. Denies pain. No BM overnight; unable to collect stool sample to rule out c. diff.. Productive cough; given Robitussin x1 w/ relief. Up w/ SBA; very steady on feet. Tele SR w/ occasional PVCs. Continues on NS @ 100 mL/hr, IV rocephin, and IV zithromax. Blood cultures negative so far. Plan to discharge back to prior living arrangement once antibiotic plan is established and hemoglobin remains stable. Last hemoglobin result was 9.4. Will continue to monitor.     Addendum: reported headache; given Tylenol and Robitussin again.

## 2019-11-10 NOTE — CONSULTS
Care Transition Initial Assessment - RN    Met with: Patient.  DATA   Active Problems:    LLL pneumonia (H)     COPD & PNA Action Plans discussed with patient at bedside.   Cognitive Status: awake, alert and oriented.  [facility]  Primary Care MD Name: Dr. Jewell Gallagher  Contact information and PCP information verified: Yes  Lives With: alone   Living Arrangements: condominium     Description of Support System: Supportive, Involved   Who is your support system?: Children       Insurance concerns: No Insurance issues identified  ASSESSMENT  Patient currently receives the following services:  None. Her daughter is an RN through [facility]. Patient uses [facility] for her medications.         Identified issues/concerns regarding health management: Patient was a smoker but quit in 1972. She follows with Dr. Best (Pulmonology thru [facility]). She knows to avoid sick contacts & watches for environmental factors that can exacerbate her COPD. Discussed the importance of completing the course of antibiotics after discharge.     PLAN  Financial costs for the patient were not discussed.  Patient given options and choices for discharge Yes.  Patient/family is agreeable to the plan?  Yes  Patient anticipates discharging to home.     Other Resources: Other (see comment)(COPD & PNA Action Plans discussed w/pt at bedside. )  Patient anticipates needs for home equipment: No, she has quad cane, 4WW & grab bars in her bathroom.   Transportation/person available to transport on day of discharge is her son, Jean Paul. He will be notified when she is ready to discharge.   Plan/Disposition: Home   Appointments: Patient declined to have CM schedule her f/u appt. Emphasized importance of following up after hospitalization.   CM will continue to follow patient until discharge for any additional needs.     Kait Dobson, RN, BSN, CPHN, CM  Inpatient Care Coordination  [facility]  [facility]  [phone]

## 2019-11-11 LAB
ANION GAP SERPL CALCULATED.3IONS-SCNC: 10 MMOL/L (ref 3–14)
BUN SERPL-MCNC: 32 MG/DL (ref 7–30)
CALCIUM SERPL-MCNC: 7.7 MG/DL (ref 8.5–10.1)
CHLORIDE SERPL-SCNC: 98 MMOL/L (ref 94–109)
CO2 SERPL-SCNC: 20 MMOL/L (ref 20–32)
CREAT SERPL-MCNC: 1.66 MG/DL (ref 0.52–1.04)
ERYTHROCYTE [DISTWIDTH] IN BLOOD BY AUTOMATED COUNT: 12.9 % (ref 10–15)
GFR SERPL CREATININE-BSD FRML MDRD: 27 ML/MIN/{1.73_M2}
GLUCOSE SERPL-MCNC: 117 MG/DL (ref 70–99)
HCT VFR BLD AUTO: 25.4 % (ref 35–47)
HGB BLD-MCNC: 8.3 G/DL (ref 11.7–15.7)
MCH RBC QN AUTO: 29.3 PG (ref 26.5–33)
MCHC RBC AUTO-ENTMCNC: 32.7 G/DL (ref 31.5–36.5)
MCV RBC AUTO: 90 FL (ref 78–100)
PLATELET # BLD AUTO: 122 10E9/L (ref 150–450)
POTASSIUM SERPL-SCNC: 3.8 MMOL/L (ref 3.4–5.3)
RBC # BLD AUTO: 2.83 10E12/L (ref 3.8–5.2)
SODIUM SERPL-SCNC: 128 MMOL/L (ref 133–144)
WBC # BLD AUTO: 13.7 10E9/L (ref 4–11)

## 2019-11-11 PROCEDURE — 94799 UNLISTED PULMONARY SVC/PX: CPT

## 2019-11-11 PROCEDURE — 94640 AIRWAY INHALATION TREATMENT: CPT | Mod: 76

## 2019-11-11 PROCEDURE — 25000128 H RX IP 250 OP 636: Performed by: INTERNAL MEDICINE

## 2019-11-11 PROCEDURE — 40000275 ZZH STATISTIC RCP TIME EA 10 MIN

## 2019-11-11 PROCEDURE — 12000000 ZZH R&B MED SURG/OB

## 2019-11-11 PROCEDURE — 25000132 ZZH RX MED GY IP 250 OP 250 PS 637: Performed by: INTERNAL MEDICINE

## 2019-11-11 PROCEDURE — 85027 COMPLETE CBC AUTOMATED: CPT | Performed by: INTERNAL MEDICINE

## 2019-11-11 PROCEDURE — 36415 COLL VENOUS BLD VENIPUNCTURE: CPT | Performed by: INTERNAL MEDICINE

## 2019-11-11 PROCEDURE — 90662 IIV NO PRSV INCREASED AG IM: CPT | Performed by: INTERNAL MEDICINE

## 2019-11-11 PROCEDURE — 25800030 ZZH RX IP 258 OP 636: Performed by: INTERNAL MEDICINE

## 2019-11-11 PROCEDURE — 25000131 ZZH RX MED GY IP 250 OP 636 PS 637: Performed by: INTERNAL MEDICINE

## 2019-11-11 PROCEDURE — 80048 BASIC METABOLIC PNL TOTAL CA: CPT | Performed by: INTERNAL MEDICINE

## 2019-11-11 PROCEDURE — 25000125 ZZHC RX 250: Performed by: INTERNAL MEDICINE

## 2019-11-11 PROCEDURE — 94640 AIRWAY INHALATION TREATMENT: CPT

## 2019-11-11 PROCEDURE — 99232 SBSQ HOSP IP/OBS MODERATE 35: CPT | Performed by: INTERNAL MEDICINE

## 2019-11-11 RX ORDER — METHYLPREDNISOLONE SODIUM SUCCINATE 40 MG/ML
40 INJECTION, POWDER, LYOPHILIZED, FOR SOLUTION INTRAMUSCULAR; INTRAVENOUS ONCE
Status: COMPLETED | OUTPATIENT
Start: 2019-11-11 | End: 2019-11-11

## 2019-11-11 RX ADMIN — PREDNISONE 40 MG: 20 TABLET ORAL at 08:57

## 2019-11-11 RX ADMIN — GABAPENTIN 100 MG: 100 CAPSULE ORAL at 21:26

## 2019-11-11 RX ADMIN — GUAIFENESIN 10 ML: 100 SOLUTION ORAL at 03:10

## 2019-11-11 RX ADMIN — INFLUENZA A VIRUS A/MICHIGAN/45/2015 X-275 (H1N1) ANTIGEN (FORMALDEHYDE INACTIVATED), INFLUENZA A VIRUS A/SINGAPORE/INFIMH-16-0019/2016 IVR-186 (H3N2) ANTIGEN (FORMALDEHYDE INACTIVATED), AND INFLUENZA B VIRUS B/MARYLAND/15/2016 BX-69A (A B/COLORADO/6/2017-LIKE VIRUS) ANTIGEN (FORMALDEHYDE INACTIVATED) 0.5 ML: 60; 60; 60 INJECTION, SUSPENSION INTRAMUSCULAR at 12:55

## 2019-11-11 RX ADMIN — IPRATROPIUM BROMIDE AND ALBUTEROL SULFATE 3 ML: .5; 3 SOLUTION RESPIRATORY (INHALATION) at 19:58

## 2019-11-11 RX ADMIN — GABAPENTIN 100 MG: 100 CAPSULE ORAL at 12:53

## 2019-11-11 RX ADMIN — PANTOPRAZOLE SODIUM 40 MG: 40 TABLET, DELAYED RELEASE ORAL at 21:26

## 2019-11-11 RX ADMIN — CEFTRIAXONE SODIUM 1 G: 1 INJECTION, POWDER, FOR SOLUTION INTRAMUSCULAR; INTRAVENOUS at 15:09

## 2019-11-11 RX ADMIN — PANTOPRAZOLE SODIUM 40 MG: 40 TABLET, DELAYED RELEASE ORAL at 08:57

## 2019-11-11 RX ADMIN — IPRATROPIUM BROMIDE AND ALBUTEROL SULFATE 3 ML: .5; 3 SOLUTION RESPIRATORY (INHALATION) at 07:14

## 2019-11-11 RX ADMIN — GABAPENTIN 100 MG: 100 CAPSULE ORAL at 17:55

## 2019-11-11 RX ADMIN — CARVEDILOL 25 MG: 25 TABLET, FILM COATED ORAL at 08:58

## 2019-11-11 RX ADMIN — ATORVASTATIN CALCIUM 40 MG: 40 TABLET, FILM COATED ORAL at 08:58

## 2019-11-11 RX ADMIN — OYSTER SHELL CALCIUM WITH VITAMIN D 1 TABLET: 500; 200 TABLET, FILM COATED ORAL at 08:58

## 2019-11-11 RX ADMIN — SERTRALINE HYDROCHLORIDE 100 MG: 100 TABLET ORAL at 08:57

## 2019-11-11 RX ADMIN — GUAIFENESIN 10 ML: 100 SOLUTION ORAL at 15:47

## 2019-11-11 RX ADMIN — AMLODIPINE BESYLATE 5 MG: 5 TABLET ORAL at 08:58

## 2019-11-11 RX ADMIN — ACETAMINOPHEN 650 MG: 325 TABLET, FILM COATED ORAL at 08:57

## 2019-11-11 RX ADMIN — GABAPENTIN 100 MG: 100 CAPSULE ORAL at 08:57

## 2019-11-11 RX ADMIN — Medication 1 CAPSULE: at 21:26

## 2019-11-11 RX ADMIN — LOSARTAN POTASSIUM 100 MG: 100 TABLET ORAL at 08:58

## 2019-11-11 RX ADMIN — AZITHROMYCIN MONOHYDRATE 500 MG: 500 INJECTION, POWDER, LYOPHILIZED, FOR SOLUTION INTRAVENOUS at 13:34

## 2019-11-11 RX ADMIN — Medication 1 CAPSULE: at 08:58

## 2019-11-11 RX ADMIN — METHYLPREDNISOLONE SODIUM SUCCINATE 40 MG: 40 INJECTION, POWDER, FOR SOLUTION INTRAMUSCULAR; INTRAVENOUS at 16:21

## 2019-11-11 RX ADMIN — IPRATROPIUM BROMIDE AND ALBUTEROL SULFATE 3 ML: .5; 3 SOLUTION RESPIRATORY (INHALATION) at 15:37

## 2019-11-11 RX ADMIN — CARVEDILOL 25 MG: 25 TABLET, FILM COATED ORAL at 17:55

## 2019-11-11 RX ADMIN — IPRATROPIUM BROMIDE AND ALBUTEROL SULFATE 3 ML: .5; 3 SOLUTION RESPIRATORY (INHALATION) at 11:33

## 2019-11-11 RX ADMIN — ASPIRIN 81 MG: 81 TABLET, COATED ORAL at 08:58

## 2019-11-11 ASSESSMENT — ACTIVITIES OF DAILY LIVING (ADL)
ADLS_ACUITY_SCORE: 15

## 2019-11-11 NOTE — PLAN OF CARE
A&Ox4. VSS on RA. Up w/ SBA and gait belt. Very steady on her feet and ambulating well. Reported headache and given Tylenol x1 w/ no further complaints of pain. Coughing intermittently this shift and was given robitussin x2 w/ relief. BC pending but are negative so far. Continues on IV rocephin and zithromax. Tele SR w/ frequent PVCs. Plan to discharge in 1-2 days. Will continue to monitor.

## 2019-11-11 NOTE — PLAN OF CARE
"VS BP (!) 147/69   Pulse 79   Temp 99  F (37.2  C) (Oral)   Resp 20   Ht 1.473 m (4' 10\")   Wt 63.5 kg (139 lb 15.9 oz)   SpO2 91%   BMI 29.26 kg/m    Lung sounds LLL crackles, diminished throughout  O2 room air  Tele SR with PVCs  Bowel sounds active, no BM this shift  Tolerating regular diet  IVF saline locked  CMS intact  Activity up with stand by assist, ambulated in hallway with staff x2 this shift  Pain denies pain  Patient/family centered care daughter in law present at bedside this morning    "

## 2019-11-11 NOTE — PROGRESS NOTES
[facility]  Hospitalist Progress Note  Patient Name: Inez Collier    MRN: [id number]  Provider: Kwadwo Kimball MD  11/11/19    Initial presenting complaint/issue to hospital (Diagnosis): shortness of breath         Assessment and Plan:      Summary of Stay: Inez Collier is a 87 year old female with history of hypertension, colitis, CKD, asthma, and COPD. She presented to the ED on 11/9/2019 for evaluation of cough , chills, and SOB. These symptoms were recurrent after being treated with a Z pack and Doxycycline starting in September. She also had some diarrhea prior to admission. ED work up found hypoxia with oxygen saturations of 88% on room air, LLL infiltrate on CT scan of the lungs, leucocytosis, and hyponatremia. Inez was started on Rocephin and Zithromax for pneumonia and given steroids and bronchodilators for COPD exacerbation.     Problem list:      1.  Acute hypoxic respiratory failure secondary to LLL pneumonia and COPD exacerbation. Improving. Off oxygen now with marginal oxygen saturations. Still weak and dyspneic with exertion.      2. COPD exacerbation due to pneumonia. Continue Prednisone and nebs. I ordered one dose of IV Solumdreol for today.     3. Hyponatremia - related to pneumonia, dehydration, and diarrhea. Repeat BMP in am.      4. Diarrhea -no bowel movement since admission     5.  Acute kidney injury on top of CKD 4 -improving, monitor      5. HTN- continue PTA amlodipine, coreg, and losartan     6. Hyperlipidemia - continue PTA lipitor     DVT Prophylaxis: Pneumatic Compression Devices  Code Status:  Full Code  Disposition: continue inpatient cares- likely home in 1-2 more days.           Interval History:      Still weak. Still dyspneic with exertion. A little discouraged that she hasn't improved more quickly.                  Physical Exam:      Last Vital Signs:  /61 (BP Location: Left arm)   Pulse 79   Temp 96.8  F (36  C) (Axillary)   Resp 20   Ht 1.473 m (4'  "10\")   Wt 63.5 kg (139 lb 15.9 oz)   SpO2 91%   BMI 29.26 kg/m      Intake/Output Summary (Last 24 hours) at 11/11/2019 1630  Last data filed at 11/11/2019 0903  Gross per 24 hour   Intake --   Output 1500 ml   Net -1500 ml       GENERAL:  Comfortable. Cooperative.  PSYCH: pleasant, oriented, No acute distress.  EYES: PERRLA, Normal conjunctiva.  HEART:  Regular rate and rhythm. No JVD. Pulses normal. No edema.  LUNGS:  Some wheezes bilaterally to auscultation, labored respiratory effort.  ABDOMEN:  Soft, no hepatosplenomegaly, normal bowel sounds.  EXTREMETIES: No clubbing, cyanosis or ischemia  SKIN:  Dry to touch, No rash.           Medications:      All current medications were reviewed.         Data:      All new lab and imaging data was reviewed.   Labs:       Lab Results   Component Value Date     11/11/2019     11/10/2019     11/09/2019    Lab Results   Component Value Date    CHLORIDE 98 11/11/2019    CHLORIDE 101 11/10/2019    CHLORIDE 94 11/09/2019    Lab Results   Component Value Date    BUN 32 11/11/2019    BUN 30 11/10/2019    BUN 30 11/09/2019      Lab Results   Component Value Date    POTASSIUM 3.8 11/11/2019    POTASSIUM 3.8 11/10/2019    POTASSIUM 4.0 11/09/2019    Lab Results   Component Value Date    CO2 20 11/11/2019    CO2 22 11/10/2019    CO2 24 11/09/2019    Lab Results   Component Value Date    CR 1.66 11/11/2019    CR 1.60 11/10/2019    CR 1.95 11/09/2019        Recent Labs   Lab 11/11/19  0657 11/10/19  0747 11/09/19  1055   WBC 13.7* 10.6 18.0*   HGB 8.3* 9.0* 9.4*   HCT 25.4* 27.6* 27.6*   MCV 90 91 89   * 98* 96*            "

## 2019-11-11 NOTE — PROGRESS NOTES
SPIRITUAL HEALTH SERVICES Progress Note  [facility]    [facility] f/u visit from weekend  attempts.  [facility] greeted Inez sitting up in chair with daughter-in-law visiting.      Daughter in law shared about Inez and the family's most recent concern of Inez's daughter's current hospitalization (in [place]) with diagnosis of a CVA.     Reflective sharing about Inez processing her diagnosis and her optimism about adjusting to the symptoms.  Inez lives alone in a [facility].  She stated that it has provided her with many activities and meals to enjoy.  Additionally, Inez follows all the local professional sports teams.      Inez has been  for approximately ten years following a 50+ year marriage, 4 children and 13 grandchildren who she stated are supportive.  Additionally, Inez is an active member of the [employer] in [place] and would appreciate daily [employer] while inpatient.     SHS was abbreviated by additional family member (daughter, nurse) running in on her lunch break.   Prayers offered to Inez.    [facility] remains available.      Nena Anthony   Intern  Phone:  [phone]

## 2019-11-12 VITALS
OXYGEN SATURATION: 93 % | SYSTOLIC BLOOD PRESSURE: 138 MMHG | RESPIRATION RATE: 20 BRPM | BODY MASS INDEX: 29.39 KG/M2 | TEMPERATURE: 98.7 F | WEIGHT: 139.99 LBS | HEART RATE: 79 BPM | HEIGHT: 58 IN | DIASTOLIC BLOOD PRESSURE: 64 MMHG

## 2019-11-12 PROBLEM — J44.1 COPD EXACERBATION (H): Status: ACTIVE | Noted: 2019-11-12

## 2019-11-12 LAB
ANION GAP SERPL CALCULATED.3IONS-SCNC: 7 MMOL/L (ref 3–14)
BUN SERPL-MCNC: 36 MG/DL (ref 7–30)
CALCIUM SERPL-MCNC: 7.9 MG/DL (ref 8.5–10.1)
CHLORIDE SERPL-SCNC: 101 MMOL/L (ref 94–109)
CO2 SERPL-SCNC: 23 MMOL/L (ref 20–32)
CREAT SERPL-MCNC: 1.61 MG/DL (ref 0.52–1.04)
ERYTHROCYTE [DISTWIDTH] IN BLOOD BY AUTOMATED COUNT: 12.9 % (ref 10–15)
GFR SERPL CREATININE-BSD FRML MDRD: 28 ML/MIN/{1.73_M2}
GLUCOSE SERPL-MCNC: 104 MG/DL (ref 70–99)
HCT VFR BLD AUTO: 25.1 % (ref 35–47)
HGB BLD-MCNC: 8.2 G/DL (ref 11.7–15.7)
MCH RBC QN AUTO: 29.6 PG (ref 26.5–33)
MCHC RBC AUTO-ENTMCNC: 32.7 G/DL (ref 31.5–36.5)
MCV RBC AUTO: 91 FL (ref 78–100)
PLATELET # BLD AUTO: 140 10E9/L (ref 150–450)
POTASSIUM SERPL-SCNC: 3.6 MMOL/L (ref 3.4–5.3)
RBC # BLD AUTO: 2.77 10E12/L (ref 3.8–5.2)
SODIUM SERPL-SCNC: 131 MMOL/L (ref 133–144)
WBC # BLD AUTO: 11.2 10E9/L (ref 4–11)

## 2019-11-12 PROCEDURE — 36415 COLL VENOUS BLD VENIPUNCTURE: CPT | Performed by: INTERNAL MEDICINE

## 2019-11-12 PROCEDURE — 25800030 ZZH RX IP 258 OP 636: Performed by: INTERNAL MEDICINE

## 2019-11-12 PROCEDURE — 25000125 ZZHC RX 250: Performed by: INTERNAL MEDICINE

## 2019-11-12 PROCEDURE — 94640 AIRWAY INHALATION TREATMENT: CPT

## 2019-11-12 PROCEDURE — 25000131 ZZH RX MED GY IP 250 OP 636 PS 637: Performed by: INTERNAL MEDICINE

## 2019-11-12 PROCEDURE — 25000132 ZZH RX MED GY IP 250 OP 250 PS 637: Performed by: INTERNAL MEDICINE

## 2019-11-12 PROCEDURE — 80048 BASIC METABOLIC PNL TOTAL CA: CPT | Performed by: INTERNAL MEDICINE

## 2019-11-12 PROCEDURE — 85027 COMPLETE CBC AUTOMATED: CPT | Performed by: INTERNAL MEDICINE

## 2019-11-12 PROCEDURE — 99238 HOSP IP/OBS DSCHRG MGMT 30/<: CPT | Performed by: INTERNAL MEDICINE

## 2019-11-12 PROCEDURE — 25000128 H RX IP 250 OP 636: Performed by: INTERNAL MEDICINE

## 2019-11-12 PROCEDURE — 94640 AIRWAY INHALATION TREATMENT: CPT | Mod: 76

## 2019-11-12 PROCEDURE — 40000275 ZZH STATISTIC RCP TIME EA 10 MIN

## 2019-11-12 RX ORDER — AZITHROMYCIN 250 MG/1
250 TABLET, FILM COATED ORAL ONCE
Qty: 4 TABLET | Refills: 0 | Status: SHIPPED | OUTPATIENT
Start: 2019-11-13 | End: 2019-11-13

## 2019-11-12 RX ORDER — ONDANSETRON 4 MG/1
4 TABLET, ORALLY DISINTEGRATING ORAL EVERY 8 HOURS PRN
Qty: 8 TABLET | Refills: 1 | Status: ON HOLD | OUTPATIENT
Start: 2019-11-12 | End: 2023-01-01

## 2019-11-12 RX ORDER — CEFUROXIME AXETIL 500 MG/1
500 TABLET ORAL 2 TIMES DAILY
Qty: 12 TABLET | Refills: 0 | Status: SHIPPED | OUTPATIENT
Start: 2019-11-12 | End: 2019-11-18

## 2019-11-12 RX ORDER — ALBUTEROL SULFATE 0.83 MG/ML
2.5 SOLUTION RESPIRATORY (INHALATION) EVERY 6 HOURS PRN
Qty: 20 VIAL | Refills: 1 | Status: SHIPPED | OUTPATIENT
Start: 2019-11-12

## 2019-11-12 RX ORDER — PREDNISONE 10 MG/1
TABLET ORAL
Qty: 12 TABLET | Refills: 0 | Status: ON HOLD | OUTPATIENT
Start: 2019-11-12 | End: 2022-04-23

## 2019-11-12 RX ADMIN — FOLIC ACID TAB 400 MCG 400 MCG: 400 TAB at 09:17

## 2019-11-12 RX ADMIN — AZITHROMYCIN MONOHYDRATE 500 MG: 500 INJECTION, POWDER, LYOPHILIZED, FOR SOLUTION INTRAVENOUS at 13:39

## 2019-11-12 RX ADMIN — ONDANSETRON 4 MG: 4 TABLET, ORALLY DISINTEGRATING ORAL at 12:03

## 2019-11-12 RX ADMIN — IPRATROPIUM BROMIDE AND ALBUTEROL SULFATE 3 ML: .5; 3 SOLUTION RESPIRATORY (INHALATION) at 11:45

## 2019-11-12 RX ADMIN — Medication 1 CAPSULE: at 09:16

## 2019-11-12 RX ADMIN — ASPIRIN 81 MG: 81 TABLET, COATED ORAL at 09:16

## 2019-11-12 RX ADMIN — CARVEDILOL 25 MG: 25 TABLET, FILM COATED ORAL at 09:17

## 2019-11-12 RX ADMIN — AMLODIPINE BESYLATE 5 MG: 5 TABLET ORAL at 09:16

## 2019-11-12 RX ADMIN — LOSARTAN POTASSIUM 100 MG: 100 TABLET ORAL at 09:16

## 2019-11-12 RX ADMIN — IPRATROPIUM BROMIDE AND ALBUTEROL SULFATE 3 ML: .5; 3 SOLUTION RESPIRATORY (INHALATION) at 07:54

## 2019-11-12 RX ADMIN — GABAPENTIN 100 MG: 100 CAPSULE ORAL at 09:16

## 2019-11-12 RX ADMIN — GUAIFENESIN 10 ML: 100 SOLUTION ORAL at 09:26

## 2019-11-12 RX ADMIN — CEFTRIAXONE SODIUM 1 G: 1 INJECTION, POWDER, FOR SOLUTION INTRAMUSCULAR; INTRAVENOUS at 15:11

## 2019-11-12 RX ADMIN — PANTOPRAZOLE SODIUM 40 MG: 40 TABLET, DELAYED RELEASE ORAL at 09:16

## 2019-11-12 RX ADMIN — IPRATROPIUM BROMIDE AND ALBUTEROL SULFATE 3 ML: .5; 3 SOLUTION RESPIRATORY (INHALATION) at 15:53

## 2019-11-12 RX ADMIN — SERTRALINE HYDROCHLORIDE 100 MG: 100 TABLET ORAL at 09:17

## 2019-11-12 RX ADMIN — OYSTER SHELL CALCIUM WITH VITAMIN D 1 TABLET: 500; 200 TABLET, FILM COATED ORAL at 09:17

## 2019-11-12 RX ADMIN — PREDNISONE 40 MG: 20 TABLET ORAL at 09:16

## 2019-11-12 RX ADMIN — ATORVASTATIN CALCIUM 40 MG: 40 TABLET, FILM COATED ORAL at 09:17

## 2019-11-12 ASSESSMENT — ACTIVITIES OF DAILY LIVING (ADL)
ADLS_ACUITY_SCORE: 15
ADLS_ACUITY_SCORE: 15
ADLS_ACUITY_SCORE: 13
ADLS_ACUITY_SCORE: 15
ADLS_ACUITY_SCORE: 15

## 2019-11-12 NOTE — PLAN OF CARE
"[id number]  VS /67 (BP Location: Left arm)   Pulse 79   Temp 96.8  F (36  C) (Axillary)   Resp 20   Ht 1.473 m (4' 10\")   Wt 63.5 kg (139 lb 15.9 oz)   SpO2 91%   BMI 29.26 kg/m    Lung sounds crackles in LLL  O2 room air  Bowel sounds active  Tolerating regular diet  IVF saline locked  CMS intact  Activity up with stand by assist  Pain denies  Patient centered care showered this afternoon  Discharge plan anticipate discharge in 1-2 days    "

## 2019-11-12 NOTE — PLAN OF CARE
A&OX4. Vitals stable. LS diminished and fine crackles. Up with SBA. Discharge instructions provided. Patient reported instructions well understood. Discharge medication to be picked at [facility] pharmacy. Patient was wheeled out of the hospital to the drive way via wheel chair at 1715. Transported home by daughter - law via private transportation.

## 2019-11-12 NOTE — PLAN OF CARE
"Assumed cares from [id number]    /64 (BP Location: Right arm)   Pulse 79   Temp 97.7  F (36.5  C) (Oral)   Resp 20   Ht 1.473 m (4' 10\")   Wt 63.5 kg (139 lb 15.9 oz)   SpO2 92%   BMI 29.26 kg/m      A&Ox4. Independent in room. Pt denies pain, N/V, has ALVAREZ, denies lightheadedness, and dizziness. IV SL. On RA, crackles in LLL. On PO prednisone, Zithromax, and Rocephin for PNA. Plan is to discharge in 1-2 days. Will continue POC.     "

## 2019-11-12 NOTE — PLAN OF CARE
"VS /64 (BP Location: Right arm)   Pulse 79   Temp 97.7  F (36.5  C) (Oral)   Resp 20   Ht 1.473 m (4' 10\")   Wt 63.5 kg (139 lb 15.9 oz)   SpO2 92%   BMI 29.26 kg/m    Lung sounds crackles in lower left lobe  O2 Room Air  Tele none  Bowel sounds active and audible in all quadrants  Tolerating Regular diet  IVF Saline Locked  Dressings none  Tests none  CMS intact  Drains none  Activity up Independent  Pain denies  Patient/family centered care Support given questions answered  Discharge plan Home in 1-2 days. Continue POC.    "

## 2019-11-12 NOTE — DISCHARGE SUMMARY
"Discharge Summary    Inez Collier MRN# [id number]   YOB: 1932 Age: 87 year old     Date of Admission:  11/9/2019  Date of Discharge:  11/12/2019  Admitting Physician:  Tera Mao MD  Discharge Physician:  Kwadwo Kimball MD  Discharging Service:  Hospitalist     [facility]  Primary Provider: Jewell Gallagher          Discharge Diagnosis:   1.  Acute hypoxic respiratory failure secondary to LLL pneumonia and COPD exacerbation.     2. COPD exacerbation due to pneumonia.      3. Hyponatremia - related to pneumonia, dehydration, and diarrhea. Improved.       4. Diarrhea. Resolved.      5.  Acute kidney injury on top of CKD 4, improved.     5. Hypertension.     6. Hyperlipidemia.             Discharge Disposition:   Discharged to home           Allergies:   Allergies   Allergen Reactions     Lisinopril      Morphine Hcl                 Condition on Discharge:   Discharge condition: Stable   Discharge vitals: Blood pressure (!) 151/66, pulse 79, temperature 98.1  F (36.7  C), temperature source Oral, resp. rate 18, height 1.473 m (4' 10\"), weight 63.5 kg (139 lb 15.9 oz), SpO2 97 %.   Code status on discharge: Full Code   Physical exam on day of discharge:   GENERAL:  Comfortable. Cooperative.  PSYCH: pleasant, oriented, No acute distress.  EYES: PERRLA, Normal conjunctiva.  HEART:  Regular rate and rhythm. No JVD. Pulses normal. No edema.  LUNGS:  Clear to auscultation, normal Respiratory effort.  ABDOMEN:  Soft, no hepatosplenomegaly, normal bowel sounds.  EXTREMETIES: No clubbing, cyanosis or ischemia  SKIN:  Dry to touch, No rash.         History of Present Illness and Hospital Course:     See detailed admission note for full details.  Inez Collier is a 87 year old female with history of hypertension, colitis, CKD, asthma, and COPD. She presented to the ED on 11/9/2019 for evaluation of cough , chills, and SOB. These symptoms were recurrent after being treated with a Z pack and " Doxycycline starting in September. She also had some diarrhea prior to admission. ED work up found hypoxia with oxygen saturations of 88% on room air, LLL infiltrate on CT scan of thee lungs, leucocytosis, and hyponatremia. Inez was started on Rocephin and Zithromax for pneumonia and given steroids and bronchodilators for COPD exacerbation. She improved over the next three days. She will discharge home today with prednisone taper, one more day of Zithromax, and 6 days of Ceftin. She will have some nebs that can be used as needed while recovering from this bout of illness.               Procedures / Imaging:   CT of chest           Consultations:   No consultations were requested during this admission             Pending Results:   None            Discharge Instructions and Follow-Up:   Discharge diet: Regular   Discharge activity: Activity as tolerated   Discharge follow-up: Follow up with primary care provider in 1 week with [facility]   Outpatient therapy: None    Other instructions: None             Discharge Medications:   Current Discharge Medication List      START taking these medications    Details   albuterol (PROVENTIL) (2.5 MG/3ML) 0.083% neb solution Take 1 vial (2.5 mg) by nebulization every 6 hours as needed for shortness of breath / dyspnea or wheezing  Qty: 20 vial, Refills: 1    Associated Diagnoses: COPD exacerbation (H)      azithromycin (ZITHROMAX) 250 MG tablet Take 1 tablet (250 mg) by mouth once for 1 dose  Qty: 4 tablet, Refills: 0    Associated Diagnoses: Pneumonia of left lower lobe due to infectious organism (H)      cefuroxime (CEFTIN) 500 MG tablet Take 1 tablet (500 mg) by mouth 2 times daily for 6 days  Qty: 12 tablet, Refills: 0    Associated Diagnoses: Pneumonia of left lower lobe due to infectious organism (H)      dextromethorphan (TUSSIN COUGH) 15 MG/5ML syrup Take 10 mLs (30 mg) by mouth 4 times daily as needed for cough  Qty: 118 mL, Refills: 1    Associated Diagnoses: Pneumonia of  left lower lobe due to infectious organism (H); COPD exacerbation (H)      predniSONE (DELTASONE) 10 MG tablet By mouth, take 3 tabs daily for 2 days, then 2 tabs daily for 2 days, then 1 tab daily for 2 days, then stop.  Qty: 12 tablet, Refills: 0    Associated Diagnoses: COPD exacerbation (H)         CONTINUE these medications which have NOT CHANGED    Details   albuterol (PROAIR HFA/PROVENTIL HFA/VENTOLIN HFA) 108 (90 Base) MCG/ACT inhaler Inhale 2 puffs into the lungs every 6 hours    Comments: Pharmacy may dispense brand covered by insurance (Proair, or proventil or ventolin or generic albuterol inhaler)      amLODIPine (NORVASC) 5 MG tablet Take 5 mg by mouth daily      aspirin (ASA) 81 MG tablet Take 81 mg by mouth daily      atorvastatin (LIPITOR) 40 MG tablet Take 40 mg by mouth daily      CALCIUM-VITAMIN D PO Take 1 tablet by mouth daily      carvedilol (COREG) 25 MG tablet Take 1 tablet (25 mg) by mouth 2 times daily (with meals)  Qty: 120 tablet, Refills: 1      fluticasone (ARNUITY ELLIPTA) 200 MCG/ACT inhaler Inhale 1 puff into the lungs daily      folic acid (FOLVITE) 400 MCG tablet Take 400 mcg by mouth daily       gabapentin (NEURONTIN) 100 MG capsule Take 100 mg by mouth 4 times daily       glucosamine-chondroitin 500-400 MG CAPS Take 1 capsule by mouth 2 times daily.      losartan (COZAAR) 50 MG tablet Take 100 mg by mouth daily       pantoprazole (PROTONIX) 40 MG enteric coated tablet Take 40 mg by mouth 2 times daily   Qty: 90 tablet, Refills: 1      sertraline (ZOLOFT) 100 MG tablet Take 100 mg by mouth daily          STOP taking these medications       budesonide (PULMICORT) 0.5 MG/2ML neb solution Comments:   Reason for Stopping:                  Total time spent in face to face contact with the patient and coordinating discharge was:  25 Minutes

## 2019-11-12 NOTE — PLAN OF CARE
"Pt has a cough, gave robitussin with relief.  No complaints of pain.  Pt has been ambulating with a steady gait through the hallways with daughter in law present today.  She is eating well, slight nausea at lunchtime. Voiding in good amounts.  Afebrile.  Continued IV ABX, Nebs.  WBC improved, Na improved slightly.  Will continue to monitor.  BP (!) 151/66 (BP Location: Left arm)   Pulse 79   Temp 98.1  F (36.7  C) (Oral)   Resp 18   Ht 1.473 m (4' 10\")   Wt 63.5 kg (139 lb 15.9 oz)   SpO2 97%   BMI 29.26 kg/m     "

## 2019-11-13 NOTE — PROGRESS NOTES
Transition Communication Hand-off for Care Transitions to Next Level of Care Provider    Name: Inez Collier  : 1932  MRN #: [id number]  Primary Care Provider: JEWELL GALLAGHER  Primary Care MD Name: Dr. Jewell Gallagher  Primary Clinic: [facility] [address]   [place] [address]  Primary Care Clinic Name: [facility]  Reason for Hospitalization:  Hyponatremia [E87.1]  Pneumonia of left lower lobe due to infectious organism (H) [J18.1]  Diarrhea, unspecified type [R19.7]  Acute renal failure superimposed on chronic kidney disease, unspecified CKD stage, unspecified acute renal failure type (H) [N17.9, N18.9]  Admit Date/Time: 2019 10:20 AM  Discharge Date:   Payor Source: Payor: HUMANA / Plan: HUMANA MEDICARE ADVANTAGE / Product Type: Medicare /     Readmission Assessment Measure (KENTON) Risk Score/category: Average         Reason for Communication Hand-off Referral: Admission diagnoses: PN  Admission diagnoses: COPD    Discharge Plan: home    Discharge Needs Assessment:  Needs      Most Recent Value   Anticipated Changes Related to Illness  none   # of Referrals Placed by CTS  External Care Coordination, Communication hand-offs to next level of Care Providers   Other Resources  Other (see comment) [COPD & PNA Action Plans discussed w/pt at bedside. ]        Follow-up plan:  No future appointments.        Key Recommendations:  Patient was a smoker but quit in . She follows with Dr. Best (Pulmonology thru [facility]). She knows to avoid sick contacts & watches for environmental factors that can exacerbate her COPD. Discussed the importance of completing the course of antibiotics after discharge.     Pt was dc'd home with new meds for zithromax, ceftin, prednisone, zofran and cough medicine.    She should follow up as scheduled with recommended BMP.    Kait Dobson, RN, BSN, CPHN, CM    AVS/Discharge Summary is the source of truth; this is a helpful guide for  improved communication of patient story

## 2019-11-15 ENCOUNTER — NURSE TRIAGE (OUTPATIENT)
Dept: NURSING | Facility: CLINIC | Age: 84
End: 2019-11-15

## 2019-11-15 LAB
BACTERIA SPEC CULT: NO GROWTH
BACTERIA SPEC CULT: NO GROWTH
Lab: NORMAL
Lab: NORMAL
SPECIMEN SOURCE: NORMAL
SPECIMEN SOURCE: NORMAL

## 2019-11-15 NOTE — TELEPHONE ENCOUNTER
The daughter is calling about a dosage question on a medication that was a discharge medication, and the pharmacy dispensed too many tablets for how the prescription was written. [facility] is [facility], her daughter will call them and verify what dose they want the patient to take, due to the antibiotic coming from the hospital upon discharge.    Santa Chen RN/ [facility]        Reason for Disposition    [1] Caller requesting NON-URGENT health information AND [2] PCP's office is the best resource    Protocols used: INFORMATION ONLY CALL-A-

## 2021-10-26 ENCOUNTER — HOSPITAL ENCOUNTER (EMERGENCY)
Facility: CLINIC | Age: 86
Discharge: HOME | End: 2021-10-26
Attending: EMERGENCY MEDICINE | Admitting: EMERGENCY MEDICINE
Payer: COMMERCIAL

## 2021-10-26 ENCOUNTER — APPOINTMENT (OUTPATIENT)
Dept: MRI IMAGING | Facility: CLINIC | Age: 86
End: 2021-10-26
Attending: EMERGENCY MEDICINE
Payer: COMMERCIAL

## 2021-10-26 VITALS
TEMPERATURE: 98.6 F | DIASTOLIC BLOOD PRESSURE: 70 MMHG | RESPIRATION RATE: 16 BRPM | HEART RATE: 64 BPM | OXYGEN SATURATION: 93 % | SYSTOLIC BLOOD PRESSURE: 147 MMHG

## 2021-10-26 DIAGNOSIS — N18.30 STAGE 3 CHRONIC KIDNEY DISEASE, UNSPECIFIED WHETHER STAGE 3A OR 3B CKD (H): ICD-10-CM

## 2021-10-26 DIAGNOSIS — R42 VERTIGO: ICD-10-CM

## 2021-10-26 LAB
ANION GAP SERPL CALCULATED.3IONS-SCNC: 9 MMOL/L (ref 3–14)
BASOPHILS # BLD AUTO: 0 10E3/UL (ref 0–0.2)
BASOPHILS NFR BLD AUTO: 1 %
BUN SERPL-MCNC: 31 MG/DL (ref 7–30)
CALCIUM SERPL-MCNC: 8.6 MG/DL (ref 8.5–10.1)
CHLORIDE BLD-SCNC: 94 MMOL/L (ref 94–109)
CO2 SERPL-SCNC: 27 MMOL/L (ref 20–32)
CREAT SERPL-MCNC: 2.21 MG/DL (ref 0.52–1.04)
EOSINOPHIL # BLD AUTO: 0.1 10E3/UL (ref 0–0.7)
EOSINOPHIL NFR BLD AUTO: 2 %
ERYTHROCYTE [DISTWIDTH] IN BLOOD BY AUTOMATED COUNT: 12.9 % (ref 10–15)
GFR SERPL CREATININE-BSD FRML MDRD: 19 ML/MIN/1.73M2
GLUCOSE BLD-MCNC: 117 MG/DL (ref 70–99)
HCT VFR BLD AUTO: 28.1 % (ref 35–47)
HGB BLD-MCNC: 9.2 G/DL (ref 11.7–15.7)
HOLD SPECIMEN: NORMAL
IMM GRANULOCYTES # BLD: 0 10E3/UL
IMM GRANULOCYTES NFR BLD: 0 %
LYMPHOCYTES # BLD AUTO: 0.9 10E3/UL (ref 0.8–5.3)
LYMPHOCYTES NFR BLD AUTO: 15 %
MCH RBC QN AUTO: 28.7 PG (ref 26.5–33)
MCHC RBC AUTO-ENTMCNC: 32.7 G/DL (ref 31.5–36.5)
MCV RBC AUTO: 88 FL (ref 78–100)
MONOCYTES # BLD AUTO: 0.5 10E3/UL (ref 0–1.3)
MONOCYTES NFR BLD AUTO: 9 %
NEUTROPHILS # BLD AUTO: 4.6 10E3/UL (ref 1.6–8.3)
NEUTROPHILS NFR BLD AUTO: 73 %
NRBC # BLD AUTO: 0 10E3/UL
NRBC BLD AUTO-RTO: 0 /100
PLATELET # BLD AUTO: 179 10E3/UL (ref 150–450)
POTASSIUM BLD-SCNC: 4.2 MMOL/L (ref 3.4–5.3)
RBC # BLD AUTO: 3.21 10E6/UL (ref 3.8–5.2)
SODIUM SERPL-SCNC: 130 MMOL/L (ref 133–144)
WBC # BLD AUTO: 6.2 10E3/UL (ref 4–11)

## 2021-10-26 PROCEDURE — 70547 MR ANGIOGRAPHY NECK W/O DYE: CPT

## 2021-10-26 PROCEDURE — 96361 HYDRATE IV INFUSION ADD-ON: CPT

## 2021-10-26 PROCEDURE — 80048 BASIC METABOLIC PNL TOTAL CA: CPT | Performed by: EMERGENCY MEDICINE

## 2021-10-26 PROCEDURE — 96374 THER/PROPH/DIAG INJ IV PUSH: CPT

## 2021-10-26 PROCEDURE — 85025 COMPLETE CBC W/AUTO DIFF WBC: CPT | Performed by: EMERGENCY MEDICINE

## 2021-10-26 PROCEDURE — 70544 MR ANGIOGRAPHY HEAD W/O DYE: CPT | Mod: XS

## 2021-10-26 PROCEDURE — 93005 ELECTROCARDIOGRAM TRACING: CPT

## 2021-10-26 PROCEDURE — 36415 COLL VENOUS BLD VENIPUNCTURE: CPT | Performed by: EMERGENCY MEDICINE

## 2021-10-26 PROCEDURE — 99285 EMERGENCY DEPT VISIT HI MDM: CPT | Mod: 25

## 2021-10-26 PROCEDURE — 258N000003 HC RX IP 258 OP 636: Performed by: EMERGENCY MEDICINE

## 2021-10-26 PROCEDURE — 250N000011 HC RX IP 250 OP 636: Performed by: EMERGENCY MEDICINE

## 2021-10-26 PROCEDURE — 70551 MRI BRAIN STEM W/O DYE: CPT

## 2021-10-26 PROCEDURE — 250N000013 HC RX MED GY IP 250 OP 250 PS 637: Performed by: EMERGENCY MEDICINE

## 2021-10-26 RX ORDER — MECLIZINE HCL 12.5 MG 12.5 MG/1
25 TABLET ORAL 4 TIMES DAILY PRN
Qty: 8 TABLET | Refills: 0 | Status: ON HOLD | OUTPATIENT
Start: 2021-10-26 | End: 2022-04-27

## 2021-10-26 RX ORDER — MECLIZINE HYDROCHLORIDE 25 MG/1
25 TABLET ORAL ONCE
Status: COMPLETED | OUTPATIENT
Start: 2021-10-26 | End: 2021-10-26

## 2021-10-26 RX ORDER — ONDANSETRON 2 MG/ML
4 INJECTION INTRAMUSCULAR; INTRAVENOUS ONCE
Status: COMPLETED | OUTPATIENT
Start: 2021-10-26 | End: 2021-10-26

## 2021-10-26 RX ADMIN — MECLIZINE HYDROCHLORIDE 25 MG: 25 TABLET ORAL at 16:41

## 2021-10-26 RX ADMIN — ONDANSETRON 4 MG: 2 INJECTION INTRAMUSCULAR; INTRAVENOUS at 16:41

## 2021-10-26 RX ADMIN — SODIUM CHLORIDE 1000 ML: 9 INJECTION, SOLUTION INTRAVENOUS at 19:26

## 2021-10-26 ASSESSMENT — ENCOUNTER SYMPTOMS
NAUSEA: 1
HEADACHES: 1
CHILLS: 1
FEVER: 0
DIZZINESS: 1

## 2021-10-26 NOTE — ED PROVIDER NOTES
"  History   Chief Complaint:  Dizziness     The history is provided by the patient.      Inez Collier is a 89 year old female with history of TIA, hypertension, CKD and COPD who presents with dizziness. Patient reports that she has been dizzy since last Friday. She feels as if \"she is on a boat in the ocean\". States she has a history of problems with vertigo, but never as severe as today. Endorses associated nausea, headaches and chills as well. Denies ringing in her ears and fullness in her ears. Denies fever.     Review of Systems   Constitutional: Positive for chills. Negative for fever.   HENT: Negative for ear pain.    Gastrointestinal: Positive for nausea.   Neurological: Positive for dizziness and headaches.   All other systems reviewed and are negative.    Allergies:  Lisinopril  Morphine Hcl    Medications:  Xanax  Tessalon  Pulmicort  Pepcid  Prilosec   Albuterol inhaler  Norvasc  Aspirin 81 mg   Lipitor  Coreg  Arnuity ellipya  Neurontin  Cozaar   Zoloft    Past Medical History:     Anemia   Hypertension  CKD  Asthma  Colitis  Cancer  Thyroid nodules   Tobacco use   Anxiety  COPD  TIA    Past Surgical History:    Hysterectomy  Tubal ligation    Family History:    Father: heart disease  Mother: heart disease     Social History:  Presents with male associate    Physical Exam     Patient Vitals for the past 24 hrs:   BP Temp Temp src Pulse Resp SpO2   10/26/21 1930 -- -- -- 64 -- --   10/26/21 1700 (!) 147/70 -- -- 56 -- --   10/26/21 1645 (!) 141/85 -- -- 73 -- 93 %   10/26/21 1401 (!) 146/107 98.6  F (37  C) Oral 64 16 98 %       Physical Exam   Constitutional: Alert, attentive, GCS 15  HENT:    Nose: Nose normal.    Mouth/Throat: Oropharynx is clear, mucous membranes are   Eyes: EOM are normal, anicteric, conjugate gaze  CV: regular rate and rhythm; no murmurs  Chest: Effort normal and breath sounds clear without wheezing or rales, symmetric bilaterally   GI:  non tender. No distension. No guarding or " rebound.    MSK: No LE edema, no tenderness to palpation of BLE.  Neurological:   GCS 15; A/Ox3; Cranial nerves 2-12 intact;   5/5 strength throughout the upper and lower extremities;   sensation intact to light touch throughout the upper and lower extremities;   2+ DTRs to the bilateral upper and lower extremities (biceps, BRs, patellar, achilles);   normal fine motor coordination intact bilaterally;   normal gait   No meningismus  Mild bilateral upper extremity dysmetria  Skin: Skin is warm and dry.     Emergency Department Course   ECG: Dizziness  ECG obtained at 1631, ECG read at 1631  Sinus bradycardia with 1st degree AV block  Right bundle branch block   Rate 58 bpm. NY interval 222 ms. QRS duration 134 ms. QT/QTc 476/467 ms. P-R-T axes 46 -19 7.     Imaging:  MRA Brain ([place] [facility] [place]) wo Contrast   Final Result   IMPRESSION:   1.  No definite vascular cutoff of the proximal major intracranial arteries noting limitations by motion artifact. No significant intracranial stenosis or aneurysm identified.   2.  Anatomic variants as detailed above.      MRA Neck ([place]) wo Contrast   Final Result   IMPRESSION:   1.  Grossly unremarkable MRA of the neck noting limitations by motion artifact as well as lack of intravenous contrast.   2.  Major arteries in the neck appear patent without evidence of dissection or significant stenosis.      MR Brain w/o Contrast   Final Result   IMPRESSION:   1.  No acute infarct, mass, hemorrhage, or herniation.   2.  Moderate diffuse parenchymal volume loss and white matter changes most likely due to chronic microvascular ischemic disease.        Report per radiology    Laboratory:  CBC: WBC: 6.2, HGB: 9.2 (L), PLT: 179  BMP: Glucose 117 (H), Sodium: 130 (L), Urea Nitrogen: 31 (H), GFR: 19 (L), Creatinine: 2.21 (H) o/w WNL     Procedures  None    Emergency Department Course:  Reviewed:  I reviewed nursing notes, vitals, past medical history and Care  Everywhere    Assessments:   I obtained history and examined the patient as noted above.    I rechecked the patient and explained findings.     Interventions:   Zofran, 4 mg, IV           Antivert, 25 mg, Oral   NS, 1 L, IV    Disposition:  The patient was discharged to home.     Medical Decision Makin-year-old woman past medical history significant for hypertension, TIA, CKD, COPD presenting for evaluation of 6 days of what sounds like mostly positional vertigo feeling like she is on her ocean.  She denies aural fullness, tenderness fevers.  Denies any trauma.  She does have some mild dysmetria on exam however MRI/MRA here is negative head and neck.  Exam is consistent with likely peripheral vertigo, likely BPPV given its positional component.  She does have mild CHRIS on CKD, she was given IV fluids and with Zofran and meclizine, her symptoms resolved.  She was ambulatory with a steady gait here.  Will discharge home with prescription for meclizine, recommended PCP follow-up for recheck of creatinine.    Diagnosis:    ICD-10-CM    1. Vertigo  R42    2. Stage 3 chronic kidney disease, unspecified whether stage 3a or 3b CKD (H)  N18.30        Discharge Medications:  Discharge Medication List as of 10/26/2021  8:27 PM      START taking these medications    Details   meclizine (ANTIVERT) 12.5 MG tablet Take 2 tablets (25 mg) by mouth 4 times daily as needed for dizziness, Disp-8 tablet, R-0, Local Print           Michael Bradley MD  [employer]  8:41 PM 10/26/21       Scribe Disclosure:  I, Evon Bauman, am serving as a scribe at 4:10 PM on 10/26/2021 to document services personally performed by Michael Bradley MD based on my observations and the provider's statements to me.            Michael Bradley MD  10/26/21 2043

## 2021-10-27 LAB
ATRIAL RATE - MUSE: 58 BPM
DIASTOLIC BLOOD PRESSURE - MUSE: NORMAL MMHG
INTERPRETATION ECG - MUSE: NORMAL
P AXIS - MUSE: 46 DEGREES
PR INTERVAL - MUSE: 222 MS
QRS DURATION - MUSE: 134 MS
QT - MUSE: 476 MS
QTC - MUSE: 467 MS
R AXIS - MUSE: -19 DEGREES
SYSTOLIC BLOOD PRESSURE - MUSE: NORMAL MMHG
T AXIS - MUSE: 7 DEGREES
VENTRICULAR RATE- MUSE: 58 BPM

## 2021-10-27 NOTE — DISCHARGE INSTRUCTIONS
You can use the meclizine as needed for dizziness.  You should drink plenty of fluids to stay well-hydrated I recommend making an appointment to follow-up with your regular doctor for recheck of your kidney function later this week.    Discharge Instructions  Vertigo  You have been diagnosed with vertigo.  This is a dizzy feeling often described as spinning or that the room is moving around you. You will often have nausea (sick to your stomach), vomiting (throwing up), and balance problems with it.  Vertigo is usually caused by a problem in the inner ear which helps control your balance.  Many things can cause vertigo, including calcium collections in the inner ear, a virus infection of the inner ear, concussion, migraine, and some medicines.  Luckily, these causes are not life threatening and will eventually go away.  However, sometimes there is a serious problem that does not show up right away.  Generally, every Emergency Department visit should have a follow-up clinic visit with either a primary or a specialty clinic/provider. Please follow-up as instructed by your emergency provider today.  Return to the Emergency Department if you have:  New or severe headache.  Double vision (seeing two of things).  Trouble speaking or hearing.  Weakness or trouble moving/using one side of your body.  Passing out.  Numbness or tingling.  Chest pain.  Vomiting that will not stop.    Treatment:  There are several commonly prescribed medications:  Antihistamines such as meclizine (Antivert ), dimenhydrinate (Dramamine ), or diphenhydramine (Benadryl ).  Prescription anti-nausea medicines, such as promethazine (Phenergan ), metoclopramide (Reglan ), or ondansetron (Zofran ).  Prescription sedative medicines, such as diazepam (Valium ), lorazepam (Ativan ), or clonazepam (Klonopin ).  Most of these medicines make you sleepy, and you should not take them before you work or drive. You should only take prescription medicines to treat  severe vertigo symptoms, and you should stop the medicine when your symptoms improve.    Follow Up:  If you have vertigo longer than three days, it is important that you follow up either with your primary provider or an Ear, Nose, and Throat (ENT) specialist.  You may need further testing to evaluate your vertigo and you may also need  vestibular  therapy which is a special form of physical therapy to make the vertigo go away.    If you were given a prescription for medicine here today, be sure to read all of the information (including the package insert) that comes with your prescription.  This will include important information about the medicine, its side effects, and any warnings that you need to know about.  The pharmacist who fills the prescription can provide more information and answer questions you may have about the medicine.  If you have questions or concerns that the pharmacist cannot address, please call or return to the Emergency Department.     Remember that you can always come back to the Emergency Department if you are not able to see your regular provider in the amount of time listed above, if you get any new symptoms, or if there is anything that worries you.

## 2022-01-01 ENCOUNTER — HEALTH MAINTENANCE LETTER (OUTPATIENT)
Age: 87
End: 2022-01-01

## 2022-04-22 ENCOUNTER — HOSPITAL ENCOUNTER (INPATIENT)
Facility: CLINIC | Age: 87
LOS: 5 days | Discharge: NURSING, REHAB OR LONG-TERM CARE | DRG: 690 | End: 2022-04-27
Attending: EMERGENCY MEDICINE | Admitting: INTERNAL MEDICINE
Payer: COMMERCIAL

## 2022-04-22 ENCOUNTER — APPOINTMENT (OUTPATIENT)
Dept: CT IMAGING | Facility: CLINIC | Age: 87
DRG: 690 | End: 2022-04-22
Attending: EMERGENCY MEDICINE
Payer: COMMERCIAL

## 2022-04-22 ENCOUNTER — APPOINTMENT (OUTPATIENT)
Dept: ULTRASOUND IMAGING | Facility: CLINIC | Age: 87
DRG: 690 | End: 2022-04-22
Attending: EMERGENCY MEDICINE
Payer: COMMERCIAL

## 2022-04-22 DIAGNOSIS — N39.0 URINARY TRACT INFECTION WITH HEMATURIA, SITE UNSPECIFIED: Primary | ICD-10-CM

## 2022-04-22 DIAGNOSIS — N18.9 ACUTE RENAL FAILURE SUPERIMPOSED ON CHRONIC KIDNEY DISEASE, UNSPECIFIED CKD STAGE, UNSPECIFIED ACUTE RENAL FAILURE TYPE: ICD-10-CM

## 2022-04-22 DIAGNOSIS — N17.9 ACUTE RENAL FAILURE SUPERIMPOSED ON CHRONIC KIDNEY DISEASE, UNSPECIFIED CKD STAGE, UNSPECIFIED ACUTE RENAL FAILURE TYPE: ICD-10-CM

## 2022-04-22 DIAGNOSIS — D64.9 ACUTE ON CHRONIC ANEMIA: ICD-10-CM

## 2022-04-22 DIAGNOSIS — N10 PYELONEPHRITIS, ACUTE: ICD-10-CM

## 2022-04-22 DIAGNOSIS — R31.9 URINARY TRACT INFECTION WITH HEMATURIA, SITE UNSPECIFIED: Primary | ICD-10-CM

## 2022-04-22 LAB
ABO/RH(D): NORMAL
ALBUMIN SERPL-MCNC: 2.9 G/DL (ref 3.4–5)
ALBUMIN UR-MCNC: 70 MG/DL
ALP SERPL-CCNC: 64 U/L (ref 40–150)
ALT SERPL W P-5'-P-CCNC: 15 U/L (ref 0–50)
ANION GAP SERPL CALCULATED.3IONS-SCNC: 12 MMOL/L (ref 3–14)
ANTIBODY SCREEN: NEGATIVE
APPEARANCE UR: ABNORMAL
APTT PPP: 36 SECONDS (ref 22–38)
AST SERPL W P-5'-P-CCNC: 43 U/L (ref 0–45)
BACTERIA #/AREA URNS HPF: ABNORMAL /HPF
BASOPHILS # BLD AUTO: 0 10E3/UL (ref 0–0.2)
BASOPHILS NFR BLD AUTO: 0 %
BILIRUB SERPL-MCNC: 0.5 MG/DL (ref 0.2–1.3)
BILIRUB UR QL STRIP: NEGATIVE
BUN SERPL-MCNC: 64 MG/DL (ref 7–30)
CALCIUM SERPL-MCNC: 9 MG/DL (ref 8.5–10.1)
CHLORIDE BLD-SCNC: 101 MMOL/L (ref 94–109)
CO2 SERPL-SCNC: 17 MMOL/L (ref 20–32)
COLOR UR AUTO: ABNORMAL
CREAT SERPL-MCNC: 3.22 MG/DL (ref 0.52–1.04)
EOSINOPHIL # BLD AUTO: 0 10E3/UL (ref 0–0.7)
EOSINOPHIL NFR BLD AUTO: 0 %
ERYTHROCYTE [DISTWIDTH] IN BLOOD BY AUTOMATED COUNT: 13.9 % (ref 10–15)
GFR SERPL CREATININE-BSD FRML MDRD: 13 ML/MIN/1.73M2
GLUCOSE BLD-MCNC: 90 MG/DL (ref 70–99)
GLUCOSE UR STRIP-MCNC: NEGATIVE MG/DL
HCT VFR BLD AUTO: 24.5 % (ref 35–47)
HEMOCCULT STL QL: NEGATIVE
HGB BLD-MCNC: 7.5 G/DL (ref 11.7–15.7)
HGB UR QL STRIP: ABNORMAL
HOLD SPECIMEN: NORMAL
IMM GRANULOCYTES # BLD: 0 10E3/UL
IMM GRANULOCYTES NFR BLD: 0 %
INR PPP: 1.16 (ref 0.85–1.15)
KETONES UR STRIP-MCNC: ABNORMAL MG/DL
LEUKOCYTE ESTERASE UR QL STRIP: ABNORMAL
LIPASE SERPL-CCNC: 133 U/L (ref 73–393)
LYMPHOCYTES # BLD AUTO: 0.7 10E3/UL (ref 0.8–5.3)
LYMPHOCYTES NFR BLD AUTO: 6 %
MCH RBC QN AUTO: 27.7 PG (ref 26.5–33)
MCHC RBC AUTO-ENTMCNC: 30.6 G/DL (ref 31.5–36.5)
MCV RBC AUTO: 90 FL (ref 78–100)
MONOCYTES # BLD AUTO: 1.1 10E3/UL (ref 0–1.3)
MONOCYTES NFR BLD AUTO: 10 %
MUCOUS THREADS #/AREA URNS LPF: PRESENT /LPF
NEUTROPHILS # BLD AUTO: 10 10E3/UL (ref 1.6–8.3)
NEUTROPHILS NFR BLD AUTO: 84 %
NITRATE UR QL: NEGATIVE
NRBC # BLD AUTO: 0 10E3/UL
NRBC BLD AUTO-RTO: 0 /100
PH UR STRIP: 5.5 [PH] (ref 5–7)
PLATELET # BLD AUTO: 141 10E3/UL (ref 150–450)
POTASSIUM BLD-SCNC: 4.4 MMOL/L (ref 3.4–5.3)
PROT SERPL-MCNC: 7 G/DL (ref 6.8–8.8)
RBC # BLD AUTO: 2.71 10E6/UL (ref 3.8–5.2)
RBC URINE: 4 /HPF
SARS-COV-2 RNA RESP QL NAA+PROBE: NEGATIVE
SODIUM SERPL-SCNC: 130 MMOL/L (ref 133–144)
SP GR UR STRIP: 1.01 (ref 1–1.03)
SPECIMEN EXPIRATION DATE: NORMAL
SQUAMOUS EPITHELIAL: <1 /HPF
TROPONIN I SERPL HS-MCNC: 14 NG/L
UROBILINOGEN UR STRIP-MCNC: NORMAL MG/DL
WBC # BLD AUTO: 12 10E3/UL (ref 4–11)
WBC CLUMPS #/AREA URNS HPF: PRESENT /HPF
WBC URINE: 106 /HPF

## 2022-04-22 PROCEDURE — 84484 ASSAY OF TROPONIN QUANT: CPT | Performed by: EMERGENCY MEDICINE

## 2022-04-22 PROCEDURE — 250N000011 HC RX IP 250 OP 636: Performed by: INTERNAL MEDICINE

## 2022-04-22 PROCEDURE — 93005 ELECTROCARDIOGRAM TRACING: CPT

## 2022-04-22 PROCEDURE — 87149 DNA/RNA DIRECT PROBE: CPT | Performed by: EMERGENCY MEDICINE

## 2022-04-22 PROCEDURE — 258N000003 HC RX IP 258 OP 636: Performed by: EMERGENCY MEDICINE

## 2022-04-22 PROCEDURE — 74176 CT ABD & PELVIS W/O CONTRAST: CPT

## 2022-04-22 PROCEDURE — C9803 HOPD COVID-19 SPEC COLLECT: HCPCS

## 2022-04-22 PROCEDURE — 85730 THROMBOPLASTIN TIME PARTIAL: CPT | Performed by: EMERGENCY MEDICINE

## 2022-04-22 PROCEDURE — 96361 HYDRATE IV INFUSION ADD-ON: CPT

## 2022-04-22 PROCEDURE — 80053 COMPREHEN METABOLIC PANEL: CPT | Performed by: EMERGENCY MEDICINE

## 2022-04-22 PROCEDURE — C9113 INJ PANTOPRAZOLE SODIUM, VIA: HCPCS | Performed by: INTERNAL MEDICINE

## 2022-04-22 PROCEDURE — 99285 EMERGENCY DEPT VISIT HI MDM: CPT | Mod: 25

## 2022-04-22 PROCEDURE — 83690 ASSAY OF LIPASE: CPT | Performed by: EMERGENCY MEDICINE

## 2022-04-22 PROCEDURE — 96365 THER/PROPH/DIAG IV INF INIT: CPT

## 2022-04-22 PROCEDURE — 76705 ECHO EXAM OF ABDOMEN: CPT

## 2022-04-22 PROCEDURE — 81001 URINALYSIS AUTO W/SCOPE: CPT | Performed by: EMERGENCY MEDICINE

## 2022-04-22 PROCEDURE — 86923 COMPATIBILITY TEST ELECTRIC: CPT | Performed by: INTERNAL MEDICINE

## 2022-04-22 PROCEDURE — 36415 COLL VENOUS BLD VENIPUNCTURE: CPT | Performed by: EMERGENCY MEDICINE

## 2022-04-22 PROCEDURE — 85610 PROTHROMBIN TIME: CPT | Performed by: EMERGENCY MEDICINE

## 2022-04-22 PROCEDURE — 82272 OCCULT BLD FECES 1-3 TESTS: CPT | Performed by: EMERGENCY MEDICINE

## 2022-04-22 PROCEDURE — 99223 1ST HOSP IP/OBS HIGH 75: CPT | Mod: AI | Performed by: INTERNAL MEDICINE

## 2022-04-22 PROCEDURE — 86901 BLOOD TYPING SEROLOGIC RH(D): CPT | Performed by: EMERGENCY MEDICINE

## 2022-04-22 PROCEDURE — 87077 CULTURE AEROBIC IDENTIFY: CPT | Performed by: EMERGENCY MEDICINE

## 2022-04-22 PROCEDURE — 85025 COMPLETE CBC W/AUTO DIFF WBC: CPT | Performed by: EMERGENCY MEDICINE

## 2022-04-22 PROCEDURE — U0005 INFEC AGEN DETEC AMPLI PROBE: HCPCS | Performed by: EMERGENCY MEDICINE

## 2022-04-22 PROCEDURE — 87186 SC STD MICRODIL/AGAR DIL: CPT | Performed by: EMERGENCY MEDICINE

## 2022-04-22 PROCEDURE — 120N000001 HC R&B MED SURG/OB

## 2022-04-22 PROCEDURE — 258N000003 HC RX IP 258 OP 636: Performed by: INTERNAL MEDICINE

## 2022-04-22 PROCEDURE — 250N000011 HC RX IP 250 OP 636: Performed by: EMERGENCY MEDICINE

## 2022-04-22 RX ORDER — ONDANSETRON 4 MG/1
4 TABLET, ORALLY DISINTEGRATING ORAL ONCE
Status: DISCONTINUED | OUTPATIENT
Start: 2022-04-22 | End: 2022-04-22

## 2022-04-22 RX ORDER — ACETAMINOPHEN 650 MG/1
650 SUPPOSITORY RECTAL EVERY 6 HOURS PRN
Status: DISCONTINUED | OUTPATIENT
Start: 2022-04-22 | End: 2022-04-27 | Stop reason: HOSPADM

## 2022-04-22 RX ORDER — SODIUM CHLORIDE 9 MG/ML
INJECTION, SOLUTION INTRAVENOUS CONTINUOUS
Status: DISCONTINUED | OUTPATIENT
Start: 2022-04-22 | End: 2022-04-22

## 2022-04-22 RX ORDER — AMOXICILLIN 250 MG
2 CAPSULE ORAL 2 TIMES DAILY PRN
Status: DISCONTINUED | OUTPATIENT
Start: 2022-04-22 | End: 2022-04-27 | Stop reason: HOSPADM

## 2022-04-22 RX ORDER — LIDOCAINE 40 MG/G
CREAM TOPICAL
Status: DISCONTINUED | OUTPATIENT
Start: 2022-04-22 | End: 2022-04-27 | Stop reason: HOSPADM

## 2022-04-22 RX ORDER — ONDANSETRON 4 MG/1
4 TABLET, ORALLY DISINTEGRATING ORAL EVERY 6 HOURS PRN
Status: DISCONTINUED | OUTPATIENT
Start: 2022-04-22 | End: 2022-04-27 | Stop reason: HOSPADM

## 2022-04-22 RX ORDER — ACETAMINOPHEN 325 MG/1
650 TABLET ORAL EVERY 6 HOURS PRN
Status: DISCONTINUED | OUTPATIENT
Start: 2022-04-22 | End: 2022-04-27 | Stop reason: HOSPADM

## 2022-04-22 RX ORDER — AMOXICILLIN 250 MG
1 CAPSULE ORAL 2 TIMES DAILY PRN
Status: DISCONTINUED | OUTPATIENT
Start: 2022-04-22 | End: 2022-04-27 | Stop reason: HOSPADM

## 2022-04-22 RX ORDER — CEFTRIAXONE 1 G/1
1 INJECTION, POWDER, FOR SOLUTION INTRAMUSCULAR; INTRAVENOUS ONCE
Status: COMPLETED | OUTPATIENT
Start: 2022-04-22 | End: 2022-04-22

## 2022-04-22 RX ORDER — ONDANSETRON 2 MG/ML
4 INJECTION INTRAMUSCULAR; INTRAVENOUS EVERY 6 HOURS PRN
Status: DISCONTINUED | OUTPATIENT
Start: 2022-04-22 | End: 2022-04-27 | Stop reason: HOSPADM

## 2022-04-22 RX ORDER — SODIUM CHLORIDE, SODIUM LACTATE, POTASSIUM CHLORIDE, CALCIUM CHLORIDE 600; 310; 30; 20 MG/100ML; MG/100ML; MG/100ML; MG/100ML
INJECTION, SOLUTION INTRAVENOUS CONTINUOUS
Status: DISCONTINUED | OUTPATIENT
Start: 2022-04-22 | End: 2022-04-23

## 2022-04-22 RX ORDER — CEFTRIAXONE 1 G/1
1 INJECTION, POWDER, FOR SOLUTION INTRAMUSCULAR; INTRAVENOUS EVERY 24 HOURS
Status: DISCONTINUED | OUTPATIENT
Start: 2022-04-23 | End: 2022-04-23

## 2022-04-22 RX ADMIN — PANTOPRAZOLE SODIUM 40 MG: 40 INJECTION, POWDER, FOR SOLUTION INTRAVENOUS at 23:24

## 2022-04-22 RX ADMIN — SODIUM CHLORIDE: 9 INJECTION, SOLUTION INTRAVENOUS at 20:48

## 2022-04-22 RX ADMIN — SODIUM CHLORIDE, POTASSIUM CHLORIDE, SODIUM LACTATE AND CALCIUM CHLORIDE: 600; 310; 30; 20 INJECTION, SOLUTION INTRAVENOUS at 23:30

## 2022-04-22 RX ADMIN — CEFTRIAXONE 1 G: 1 INJECTION, POWDER, FOR SOLUTION INTRAMUSCULAR; INTRAVENOUS at 20:49

## 2022-04-22 ASSESSMENT — ENCOUNTER SYMPTOMS
UNEXPECTED WEIGHT CHANGE: 1
VOMITING: 1
SHORTNESS OF BREATH: 0
APPETITE CHANGE: 1
ABDOMINAL PAIN: 1
CHILLS: 1
FEVER: 1
FATIGUE: 1
DIARRHEA: 0
BLOOD IN STOOL: 1

## 2022-04-22 ASSESSMENT — ACTIVITIES OF DAILY LIVING (ADL)
EQUIPMENT_CURRENTLY_USED_AT_HOME: WALKER, ROLLING
TOILETING_ISSUES: NO
ADLS_ACUITY_SCORE: 12
CHANGE_IN_FUNCTIONAL_STATUS_SINCE_ONSET_OF_CURRENT_ILLNESS/INJURY: NO
ADLS_ACUITY_SCORE: 14
TRANSFERRING: 0-->ASSISTANCE NEEDED (DEVELOPMETNALLY APPROPRIATE)
VISION_MANAGEMENT: GLASSESS
FALL_HISTORY_WITHIN_LAST_SIX_MONTHS: YES
DIFFICULTY_EATING/SWALLOWING: NO
DOING_ERRANDS_INDEPENDENTLY_DIFFICULTY: NO
WALKING_OR_CLIMBING_STAIRS: STAIR CLIMBING DIFFICULTY, REQUIRES EQUIPMENT
WALKING_OR_CLIMBING_STAIRS_DIFFICULTY: NO
TRANSFERRING: 1-->ASSISTANCE (EQUIPMENT/PERSON) NEEDED
WEAR_GLASSES_OR_BLIND: YES
DRESSING/BATHING_DIFFICULTY: NO
CONCENTRATING,_REMEMBERING_OR_MAKING_DECISIONS_DIFFICULTY: YES
ADLS_ACUITY_SCORE: 14

## 2022-04-22 NOTE — ED PROVIDER NOTES
History   Chief Complaint:  Generalized Weakness     The history is provided by the patient.      Inez Collier is a 89 year old female with history of hypertension, hyperlipidemia, ulcerative proctitis, COPD, and iron deficiency anemia who presents with generalized weakness. On March 24th she had a hemoglobin of 8.1 at an outpatient lab on a lab check for her chronic iron deficiency anemia. She has had three iron infusions since then. She has not had a blood transfusion. She does not know what causes her anemia. They are still in the process of evaluating what is occurring. Patient presents today due to increased fatigue and decreased appetite ongoing for about one week. She has only been able to eat a small amount of cereal the past few days. She complains of bilateral epigastric abdominal pain after eating. She vomits after eating for the past four days. She has began noticing a small amount of blood in her stool starting one month ago. It is bright red in appearance. She thinks it might be due to hemorrhoids. She does have history of colitis several years ago and had blood in her stool then as well. She complains of a subjective fever and chills. She has unintentionally lost 30 lbs over the past few years. Visitor in the room reports she has been pale for sometime. Denies diarrhea. No new shortness of breath or chest pain. No history of cholecystectomy or appendectomy.     Review of Systems   Constitutional: Positive for appetite change, chills, fatigue, fever and unexpected weight change.   Respiratory: Negative for shortness of breath.    Cardiovascular: Negative for chest pain.   Gastrointestinal: Positive for abdominal pain, blood in stool and vomiting. Negative for diarrhea.   All other systems reviewed and are negative.    Allergies:  Lisinopril   Morphine hcl     Medications:  Proair hfa  Proventil   Norvasc   Aspirin 81 mg  Lipitor   Coreg   Arnuity ellipta   Folvite   Neurontin   Cozaar   Antivert    Zofran-odt   Protonix   Zoloft   Benicar   Pepcid   Pulmicort     Past Medical History:     Asthma   CKD stage 4  Ulcerative proctitis   Hypertension   Mixed hyperlipidemia   Pneumonia   COPD   UTI   Skin cancer   Thyroid nodule  Pulmonary nodule   Hyponatremia   JUAN   GERD  Iron deficiency anemia   Left carotid stenosis   TIA       Past Surgical History:    Hysterectomy   Tubal ligation   Ectopic pregnancy surgery x2     Family History:    Father: heart disease  Mother: heart disease  Brother: liver cancer, colon cancer, CAD  Sister: breast cancer, colon cancer, heart disease     Social History:  Presents to ED with visitor     Physical Exam     Patient Vitals for the past 24 hrs:   BP Temp Temp src Pulse Resp SpO2   04/22/22 2030 113/67 -- -- 66 -- --   04/22/22 2015 130/68 -- -- 65 -- --   04/22/22 1930 -- -- -- 64 -- 100 %   04/22/22 1915 -- -- -- 66 -- --   04/22/22 1900 122/58 -- -- 62 -- 99 %   04/22/22 1845 114/65 -- -- 63 -- 99 %   04/22/22 1830 129/64 -- -- 63 -- 99 %   04/22/22 1829 -- -- -- -- -- 96 %   04/22/22 1828 -- -- -- 66 -- (!) 86 %   04/22/22 1815 120/67 -- -- 62 -- --   04/22/22 1810 119/60 -- -- 63 -- 90 %   04/22/22 1730 110/58 -- -- -- -- --   04/22/22 1715 105/58 -- -- 62 -- --   04/22/22 1700 114/54 -- -- 61 -- 91 %   04/22/22 1645 114/81 -- -- 62 -- --   04/22/22 1630 128/61 -- -- 62 -- --   04/22/22 1624 128/69 97.8  F (36.6  C) Oral 61 18 --   04/22/22 1549 118/76 98.2  F (36.8  C) Temporal 65 18 95 %       Physical Exam  Constitutional:       General: She is not in acute distress.     Appearance: She is not diaphoretic.   HENT:      Head: Atraumatic.      Mouth/Throat:      Pharynx: No oropharyngeal exudate.   Eyes:      General: No scleral icterus.     Pupils: Pupils are equal, round, and reactive to light.   Cardiovascular:      Heart sounds: Normal heart sounds.   Pulmonary:      Effort: No respiratory distress.      Breath sounds: Normal breath sounds.   Abdominal:      General:  Bowel sounds are normal.      Palpations: Abdomen is soft.      Tenderness: There is abdominal tenderness in the epigastric area.   Genitourinary:     Rectum: Guaiac result negative (brown stool). External hemorrhoid (that are not bleeding) present.   Musculoskeletal:         General: No tenderness.   Skin:     General: Skin is warm.      Findings: No rash.       Emergency Department Course   ECG  ECG obtained at 1634, ECG read at 1640  Normal sinus rhythm  Right bundle branch block    Abnormal ECG   No significant change as compared to prior, dated 10/28/21.  Rate 63 bpm. KS interval 206 ms. QRS duration 140 ms. QT/QTc 472/483 ms. P-R-T axes 41 -9 -5.     Imaging:  CT Abdomen Pelvis w/o Contrast   Final Result   IMPRESSION:    1.  Small bilateral pleural effusions and bibasilar atelectasis.   2.  Advanced atherosclerotic disease with 3.2 x 2.7 cm infrarenal abdominal aortic aneurysm.   3.  Sigmoid diverticulosis.   4.  No evidence for bowel obstruction or other findings to account for the patient's symptoms.         US Abdomen Limited (RUQ)   Final Result   IMPRESSION:   1.  No evidence for gallstones or cholecystitis.   2.  Echogenic right renal parenchyma can be seen with chronic renal disease. Several small cysts. No hydronephrosis.            Report per radiology    Laboratory:  Labs Ordered and Resulted from Time of ED Arrival to Time of ED Departure   INR - Abnormal       Result Value    INR 1.16 (*)    COMPREHENSIVE METABOLIC PANEL - Abnormal    Sodium 130 (*)     Potassium 4.4      Chloride 101      Carbon Dioxide (CO2) 17 (*)     Anion Gap 12      Urea Nitrogen 64 (*)     Creatinine 3.22 (*)     Calcium 9.0      Glucose 90      Alkaline Phosphatase 64      AST 43      ALT 15      Protein Total 7.0      Albumin 2.9 (*)     Bilirubin Total 0.5      GFR Estimate 13 (*)    ROUTINE UA WITH MICROSCOPIC REFLEX TO CULTURE - Abnormal    Color Urine Light Yellow      Appearance Urine Slightly Cloudy (*)     Glucose  Urine Negative      Bilirubin Urine Negative      Ketones Urine Trace (*)     Specific Gravity Urine 1.007      Blood Urine Trace (*)     pH Urine 5.5      Protein Albumin Urine 70  (*)     Urobilinogen Urine Normal      Nitrite Urine Negative      Leukocyte Esterase Urine Large (*)     Bacteria Urine Many (*)     WBC Clumps Urine Present (*)     Mucus Urine Present (*)     RBC Urine 4 (*)     WBC Urine 106 (*)     Squamous Epithelials Urine <1     CBC WITH PLATELETS AND DIFFERENTIAL - Abnormal    WBC Count 12.0 (*)     RBC Count 2.71 (*)     Hemoglobin 7.5 (*)     Hematocrit 24.5 (*)     MCV 90      MCH 27.7      MCHC 30.6 (*)     RDW 13.9      Platelet Count 141 (*)     % Neutrophils 84      % Lymphocytes 6      % Monocytes 10      % Eosinophils 0      % Basophils 0      % Immature Granulocytes 0      NRBCs per 100 WBC 0      Absolute Neutrophils 10.0 (*)     Absolute Lymphocytes 0.7 (*)     Absolute Monocytes 1.1      Absolute Eosinophils 0.0      Absolute Basophils 0.0      Absolute Immature Granulocytes 0.0      Absolute NRBCs 0.0     PARTIAL THROMBOPLASTIN TIME - Normal    aPTT 36     LIPASE - Normal    Lipase 133     TROPONIN I - Normal    Troponin I High Sensitivity 14     OCCULT BLOOD STOOL - Normal    Occult Blood Negative     COVID-19 VIRUS (CORONAVIRUS) BY PCR   TYPE AND SCREEN, ADULT    ABO/RH(D) O POS      Antibody Screen Negative      SPECIMEN EXPIRATION DATE [id number]     URINE CULTURE   BLOOD CULTURE   BLOOD CULTURE   ABO/RH TYPE AND SCREEN      Emergency Department Course:     Reviewed:  I reviewed nursing notes, vitals, past medical history and Care Everywhere    Assessments:  1605 I obtained history and examined the patient as noted above.   2012 I rechecked the patient and explained findings.     Consults:  2038 I spoke with Dr. Emmanuel from the Hospitalist service regarding patient's presentation, findings, and plan of care.     Interventions:   Rocephin 1 g IV     Disposition:  The patient  was admitted to the hospital under the care of Dr. Emmanuel.     Impression & Plan     Medical Decision Making:  This patient is an 89-year-old woman who presents with her family for evaluation of intractable vomiting over the last 2 or 3 days.  She has been having postprandial upper abdominal pain and has been anorexic.  She really has not been eating taking much fluids in.  She does appear to be dehydrated and her chronic renal failure appears to have an acute component now.    With respect to the upper abdominal pain, no gallstone seen on her ultrasound.  CT without obstruction or diverticulitis.    Her vomiting is likely in the count of pyonephritis.  She has pyuria and bacteriuria with a leukocytosis and echogenic renal parenchyma on the right based on the ultrasound.  She is not septic or hypotensive though.  She is being gently hydrated with IV fluids given her acute on chronic renal failure.  Blood cultures were obtained and she was started on ceftriaxone.  She is feeling improved with treatment here in the ED and is now tolerating some fluids.    The patient will be admitted to the hospitalist service for further work-up.    The patient also has chronic anemia for which she is seen by oncology/hematology.  It is thought to be iron deficiency anemia with anemia of chronic disease.  She has had 3 out of 4 planned iron infusions so far.  She has noted some streaking of blood in her stools.  Rectal exam today demonstrates external hemorrhoids are not bleeding but no palpable internal hemorrhoids.  Stool is brown and guaiac negative.  Hemoglobin will need to be trended.    Diagnosis:    ICD-10-CM    1. Pyelonephritis, acute  N10    2. Acute renal failure superimposed on chronic kidney disease, unspecified CKD stage, unspecified acute renal failure type (H)  N17.9     N18.9    3. Acute on chronic anemia  D64.9        Covid-19  Inez Collier was evaluated during a global COVID-19 pandemic, which necessitated  consideration that the patient might be at risk for infection with the SARS-CoV-2 virus that causes COVID-19.   Applicable protocols for evaluation were followed during the patient's care.   COVID-19 was considered as part of the patient's evaluation. The plan for testing is:  a test was obtained during this visit.    Scribe Disclosure:  I, Danny Jay, am serving as a scribe at 4:01 PM on 4/22/2022 to document services personally performed by Kobe Santos MD based on my observations and the provider's statements to me.            Kobe Santos MD  04/22/22 2050

## 2022-04-22 NOTE — ED TRIAGE NOTES
Weakness, decreased appetite x1 week. Tired all the time. Receives iron infusions. Last Hgb check 4 weeks ago

## 2022-04-22 NOTE — LETTER
Savi Chicas RN Case Manager  Inpatient Care Coordination  [facility]   [phone]    Discharge orders

## 2022-04-23 ENCOUNTER — APPOINTMENT (OUTPATIENT)
Dept: PHYSICAL THERAPY | Facility: CLINIC | Age: 87
DRG: 690 | End: 2022-04-23
Attending: INTERNAL MEDICINE
Payer: COMMERCIAL

## 2022-04-23 LAB
ACINETOBACTER SPECIES: NOT DETECTED
ANION GAP SERPL CALCULATED.3IONS-SCNC: 14 MMOL/L (ref 3–14)
BLD PROD TYP BPU: NORMAL
BLOOD COMPONENT TYPE: NORMAL
BUN SERPL-MCNC: 61 MG/DL (ref 7–30)
CALCIUM SERPL-MCNC: 8.6 MG/DL (ref 8.5–10.1)
CHLORIDE BLD-SCNC: 105 MMOL/L (ref 94–109)
CITROBACTER SPECIES: NOT DETECTED
CO2 SERPL-SCNC: 15 MMOL/L (ref 20–32)
CODING SYSTEM: NORMAL
CREAT SERPL-MCNC: 3.43 MG/DL (ref 0.52–1.04)
CROSSMATCH: NORMAL
CTX-M: NOT DETECTED
ENTEROBACTER SPECIES: NOT DETECTED
ERYTHROCYTE [DISTWIDTH] IN BLOOD BY AUTOMATED COUNT: 14.1 % (ref 10–15)
ESCHERICHIA COLI: DETECTED
FERRITIN SERPL-MCNC: 1100 NG/ML (ref 8–252)
FOLATE SERPL-MCNC: 18.8 NG/ML
GFR SERPL CREATININE-BSD FRML MDRD: 12 ML/MIN/1.73M2
GLUCOSE BLD-MCNC: 78 MG/DL (ref 70–99)
HCT VFR BLD AUTO: 21.9 % (ref 35–47)
HGB BLD-MCNC: 6.8 G/DL (ref 11.7–15.7)
IMP: NOT DETECTED
IRON SATN MFR SERPL: 12 % (ref 15–46)
IRON SERPL-MCNC: 20 UG/DL (ref 35–180)
ISSUE DATE AND TIME: NORMAL
KLEBSIELLA OXYTOCA: NOT DETECTED
KLEBSIELLA PNEUMONIAE: NOT DETECTED
KPC: NOT DETECTED
MCH RBC QN AUTO: 27.6 PG (ref 26.5–33)
MCHC RBC AUTO-ENTMCNC: 31.1 G/DL (ref 31.5–36.5)
MCV RBC AUTO: 89 FL (ref 78–100)
NDM: NOT DETECTED
OXA (DETECTED/NOT DETECTED): NOT DETECTED
PLATELET # BLD AUTO: 139 10E3/UL (ref 150–450)
POTASSIUM BLD-SCNC: 3.9 MMOL/L (ref 3.4–5.3)
PROTEUS SPECIES: NOT DETECTED
PSEUDOMONAS AERUGINOSA: NOT DETECTED
RBC # BLD AUTO: 2.46 10E6/UL (ref 3.8–5.2)
RETICS # AUTO: 0.06 10E6/UL (ref 0.03–0.1)
RETICS/RBC NFR AUTO: 2.5 % (ref 0.5–2)
SODIUM SERPL-SCNC: 134 MMOL/L (ref 133–144)
TIBC SERPL-MCNC: 166 UG/DL (ref 240–430)
UNIT ABO/RH: NORMAL
UNIT NUMBER: NORMAL
UNIT STATUS: NORMAL
UNIT TYPE ISBT: 5100
VIM: NOT DETECTED
VIT B12 SERPL-MCNC: 1115 PG/ML (ref 193–986)
WBC # BLD AUTO: 12.6 10E3/UL (ref 4–11)

## 2022-04-23 PROCEDURE — 82728 ASSAY OF FERRITIN: CPT | Performed by: INTERNAL MEDICINE

## 2022-04-23 PROCEDURE — 80048 BASIC METABOLIC PNL TOTAL CA: CPT | Performed by: INTERNAL MEDICINE

## 2022-04-23 PROCEDURE — 83550 IRON BINDING TEST: CPT | Performed by: INTERNAL MEDICINE

## 2022-04-23 PROCEDURE — 99233 SBSQ HOSP IP/OBS HIGH 50: CPT | Performed by: INTERNAL MEDICINE

## 2022-04-23 PROCEDURE — 82746 ASSAY OF FOLIC ACID SERUM: CPT | Performed by: INTERNAL MEDICINE

## 2022-04-23 PROCEDURE — 999N000157 HC STATISTIC RCP TIME EA 10 MIN

## 2022-04-23 PROCEDURE — 85027 COMPLETE CBC AUTOMATED: CPT | Performed by: INTERNAL MEDICINE

## 2022-04-23 PROCEDURE — P9016 RBC LEUKOCYTES REDUCED: HCPCS | Performed by: INTERNAL MEDICINE

## 2022-04-23 PROCEDURE — 82607 VITAMIN B-12: CPT | Performed by: INTERNAL MEDICINE

## 2022-04-23 PROCEDURE — 97161 PT EVAL LOW COMPLEX 20 MIN: CPT | Mod: GP | Performed by: PHYSICAL THERAPIST

## 2022-04-23 PROCEDURE — 250N000011 HC RX IP 250 OP 636: Performed by: INTERNAL MEDICINE

## 2022-04-23 PROCEDURE — 85045 AUTOMATED RETICULOCYTE COUNT: CPT | Performed by: INTERNAL MEDICINE

## 2022-04-23 PROCEDURE — 258N000003 HC RX IP 258 OP 636: Performed by: INTERNAL MEDICINE

## 2022-04-23 PROCEDURE — 94640 AIRWAY INHALATION TREATMENT: CPT | Mod: 76

## 2022-04-23 PROCEDURE — 36415 COLL VENOUS BLD VENIPUNCTURE: CPT | Performed by: INTERNAL MEDICINE

## 2022-04-23 PROCEDURE — 250N000013 HC RX MED GY IP 250 OP 250 PS 637: Performed by: INTERNAL MEDICINE

## 2022-04-23 PROCEDURE — 120N000001 HC R&B MED SURG/OB

## 2022-04-23 PROCEDURE — 94640 AIRWAY INHALATION TREATMENT: CPT

## 2022-04-23 PROCEDURE — C9113 INJ PANTOPRAZOLE SODIUM, VIA: HCPCS | Performed by: INTERNAL MEDICINE

## 2022-04-23 RX ORDER — ALBUTEROL SULFATE 90 UG/1
2 AEROSOL, METERED RESPIRATORY (INHALATION) EVERY 6 HOURS
Status: DISCONTINUED | OUTPATIENT
Start: 2022-04-23 | End: 2022-04-26

## 2022-04-23 RX ORDER — CEFTRIAXONE 2 G/1
2 INJECTION, POWDER, FOR SOLUTION INTRAMUSCULAR; INTRAVENOUS EVERY 24 HOURS
Status: DISCONTINUED | OUTPATIENT
Start: 2022-04-23 | End: 2022-04-27 | Stop reason: HOSPADM

## 2022-04-23 RX ORDER — AMLODIPINE BESYLATE 5 MG/1
10 TABLET ORAL DAILY
Status: DISCONTINUED | OUTPATIENT
Start: 2022-04-23 | End: 2022-04-27 | Stop reason: HOSPADM

## 2022-04-23 RX ORDER — SODIUM CHLORIDE 9 MG/ML
INJECTION, SOLUTION INTRAVENOUS CONTINUOUS
Status: DISCONTINUED | OUTPATIENT
Start: 2022-04-23 | End: 2022-04-24

## 2022-04-23 RX ORDER — ALBUTEROL SULFATE 0.83 MG/ML
2.5 SOLUTION RESPIRATORY (INHALATION) EVERY 6 HOURS PRN
Status: DISCONTINUED | OUTPATIENT
Start: 2022-04-23 | End: 2022-04-27 | Stop reason: HOSPADM

## 2022-04-23 RX ORDER — FERROUS SULFATE 325(65) MG
325 TABLET ORAL
COMMUNITY

## 2022-04-23 RX ORDER — OLMESARTAN MEDOXOMIL 20 MG/1
20 TABLET ORAL DAILY
Status: ON HOLD | COMMUNITY
End: 2022-04-27

## 2022-04-23 RX ORDER — ATORVASTATIN CALCIUM 40 MG/1
40 TABLET, FILM COATED ORAL DAILY
Status: DISCONTINUED | OUTPATIENT
Start: 2022-04-23 | End: 2022-04-27 | Stop reason: HOSPADM

## 2022-04-23 RX ORDER — AMLODIPINE BESYLATE 10 MG/1
10 TABLET ORAL EVERY MORNING
Status: ON HOLD | COMMUNITY
End: 2023-01-01

## 2022-04-23 RX ORDER — LANOLIN ALCOHOL/MO/W.PET/CERES
400 CREAM (GRAM) TOPICAL DAILY
Status: DISCONTINUED | OUTPATIENT
Start: 2022-04-23 | End: 2022-04-23

## 2022-04-23 RX ORDER — FAMOTIDINE 20 MG/1
20 TABLET, FILM COATED ORAL 2 TIMES DAILY
COMMUNITY
End: 2023-01-01

## 2022-04-23 RX ORDER — SODIUM CHLORIDE, SODIUM LACTATE, POTASSIUM CHLORIDE, CALCIUM CHLORIDE 600; 310; 30; 20 MG/100ML; MG/100ML; MG/100ML; MG/100ML
INJECTION, SOLUTION INTRAVENOUS CONTINUOUS
Status: DISCONTINUED | OUTPATIENT
Start: 2022-04-23 | End: 2022-04-23 | Stop reason: ALTCHOICE

## 2022-04-23 RX ORDER — CARVEDILOL 25 MG/1
25 TABLET ORAL 2 TIMES DAILY WITH MEALS
Status: DISCONTINUED | OUTPATIENT
Start: 2022-04-23 | End: 2022-04-27 | Stop reason: HOSPADM

## 2022-04-23 RX ADMIN — ATORVASTATIN CALCIUM 40 MG: 40 TABLET, FILM COATED ORAL at 14:04

## 2022-04-23 RX ADMIN — CEFTRIAXONE 2 G: 2 INJECTION, POWDER, FOR SOLUTION INTRAMUSCULAR; INTRAVENOUS at 20:13

## 2022-04-23 RX ADMIN — PANTOPRAZOLE SODIUM 40 MG: 40 INJECTION, POWDER, FOR SOLUTION INTRAVENOUS at 20:57

## 2022-04-23 RX ADMIN — ALBUTEROL SULFATE 2 PUFF: 90 AEROSOL, METERED RESPIRATORY (INHALATION) at 19:33

## 2022-04-23 RX ADMIN — ALBUTEROL SULFATE 2 PUFF: 90 AEROSOL, METERED RESPIRATORY (INHALATION) at 17:02

## 2022-04-23 RX ADMIN — SODIUM CHLORIDE: 9 INJECTION, SOLUTION INTRAVENOUS at 14:09

## 2022-04-23 RX ADMIN — CARVEDILOL 25 MG: 25 TABLET, FILM COATED ORAL at 18:49

## 2022-04-23 RX ADMIN — FLUTICASONE FUROATE 1 PUFF: 200 POWDER RESPIRATORY (INHALATION) at 17:02

## 2022-04-23 RX ADMIN — AMLODIPINE BESYLATE 10 MG: 5 TABLET ORAL at 14:04

## 2022-04-23 RX ADMIN — SERTRALINE HYDROCHLORIDE 150 MG: 50 TABLET ORAL at 14:18

## 2022-04-23 RX ADMIN — PANTOPRAZOLE SODIUM 40 MG: 40 INJECTION, POWDER, FOR SOLUTION INTRAVENOUS at 14:05

## 2022-04-23 ASSESSMENT — ACTIVITIES OF DAILY LIVING (ADL)
ADLS_ACUITY_SCORE: 8
ADLS_ACUITY_SCORE: 7
ADLS_ACUITY_SCORE: 8
ADLS_ACUITY_SCORE: 8
ADLS_ACUITY_SCORE: 7
ADLS_ACUITY_SCORE: 8
ADLS_ACUITY_SCORE: 7
ADLS_ACUITY_SCORE: 8
ADLS_ACUITY_SCORE: 7
ADLS_ACUITY_SCORE: 8
ADLS_ACUITY_SCORE: 7
ADLS_ACUITY_SCORE: 7
ADLS_ACUITY_SCORE: 8
ADLS_ACUITY_SCORE: 7

## 2022-04-23 NOTE — PROGRESS NOTES
DREA attempted to see patient but has visitors currently.  DREA called [facility] and patient is in assisted living and only receives homemaking services.  In addition she independently eats in the dining room.  On call nurse number is [phone] if ready to discharge this weekend.

## 2022-04-23 NOTE — PLAN OF CARE
Blood pressure (!) 147/89, pulse 66, temperature 98.5  F (36.9  C), temperature source Oral, resp. rate 18, weight 55.5 kg (122 lb 6.4 oz), SpO2 96 %.    Assumed care at 2230.  VSS.  Pt A&Ox4 on 2 Lpm of O2 via nasal cannula.  Patient oriented to the room.  SBA with GB and Walker.  Received while in ED.  On LR at 100 mL per hour in PIV on Right Arm.  Protonix provided. Scattered bruising. CR 3.22 and .  Provider paged regarding cardiac monitoring.  Continue with POC.

## 2022-04-23 NOTE — PROVIDER NOTIFICATION
DATE:  4/23/2022   TIME OF RECEIPT FROM LAB:  5695   LAB TEST:  Blood culture result from 04- drawn at 2021  LAB VALUE:  Positive for E-Coli  RESULTS GIVEN WITH READ-BACK TO (PROVIDER):  Dr. Barry  TIME LAB VALUE REPORTED TO PROVIDER:   4014

## 2022-04-23 NOTE — PHARMACY-ADMISSION MEDICATION HISTORY
Admission medication history interview status for this patient is complete. See [facility] admission navigator for allergy information, prior to admission medications and immunization status.     Medication history interview done, indicate source(s): Patient and Family (Son Jean Paul)  Medication history resources (including written lists, pill bottles, clinic record): Rx refill hx  [facility]    Changes made to PTA medication list:  Added:  Iron, Pepcid & Olmesartan  Changed:   -Amlodipine 5mg daily --> 10mg daily  -Ca+D daily --> 2 tabs BID  -Gabapentin 100mg QID --> BID  -Glucosamine chondroitin bid--> 3 tabs qam  -Sertraline 100mg daily --> 150mg qam  Reported as Not Taking:  Folic acid, Losartan, Protonix & Prednisone.  Removed: Folic acid, Losartan, Protonix & Prednisone    Actions taken by pharmacist (provider contacted, etc):None     Additional medication history information:None    Medication reconciliation/reorder completed by provider prior to medication history?  No     Prior to Admission medications    Medication Sig Last Dose Taking? Auth Provider   albuterol (PROAIR HFA/PROVENTIL HFA/VENTOLIN HFA) 108 (90 Base) MCG/ACT inhaler Inhale 2 puffs into the lungs every 6 hours prn Yes Unknown, Entered By History   albuterol (PROVENTIL) (2.5 MG/3ML) 0.083% neb solution Take 1 vial (2.5 mg) by nebulization every 6 hours as needed for shortness of breath / dyspnea or wheezing prn Yes Kwadwo Kimball MD   amLODIPine (NORVASC) 10 MG tablet Take 10 mg by mouth every morning 4/22/2022 at am Yes Unknown, Entered By History   aspirin (ASA) 81 MG tablet Take 81 mg by mouth daily 4/22/2022 at Unknown time Yes Unknown, Entered By History   atorvastatin (LIPITOR) 40 MG tablet Take 40 mg by mouth daily 4/22/2022 at Unknown time Yes Unknown, Entered By History   Calcium Citrate-Vitamin D (CALCIUM CITRATE + D3) 200-250 MG-UNIT TABS Take 2 tablets by mouth 2 times daily 4/22/2022 at Unknown time Yes Unknown, Entered By  History   carvedilol (COREG) 25 MG tablet Take 1 tablet (25 mg) by mouth 2 times daily (with meals) 4/22/2022 at Unknown time Yes Wellington Pepper MD   dextromethorphan (TUSSIN COUGH) 15 MG/5ML syrup Take 10 mLs (30 mg) by mouth 4 times daily as needed for cough prn Yes Kwadwo Kimball MD   famotidine (PEPCID) 20 MG tablet Take 20 mg by mouth 2 times daily 4/22/2022 at Unknown time Yes Unknown, Entered By History   ferrous sulfate (FEROSUL) 325 (65 Fe) MG tablet Take 325 mg by mouth daily (with breakfast) 4/22/2022 at am Yes Unknown, Entered By History   fluticasone (ARNUITY ELLIPTA) 200 MCG/ACT inhaler Inhale 1 puff into the lungs daily 4/22/2022 at Unknown time Yes Unknown, Entered By History   gabapentin (NEURONTIN) 100 MG capsule Take 100 mg by mouth 2 times daily 4/22/2022 at Unknown time Yes Reported, Patient   glucosamine-chondroitin 500-400 MG CAPS Take 3 capsules by mouth every morning 4/22/2022 at am Yes Unknown, Entered By History   meclizine (ANTIVERT) 12.5 MG tablet Take 2 tablets (25 mg) by mouth 4 times daily as needed for dizziness prn Yes Michael Bradley MD   olmesartan (BENICAR) 20 MG tablet Take 20 mg by mouth daily 4/22/2022 at Unknown time Yes Unknown, Entered By History   ondansetron (ZOFRAN-ODT) 4 MG ODT tab Take 1 tablet (4 mg) by mouth every 8 hours as needed for nausea prn Yes Kwadwo Kimball MD   sertraline (ZOLOFT) 100 MG tablet Take 150 mg by mouth every morning 4/22/2022 at am Yes Reported, Patient

## 2022-04-23 NOTE — PROGRESS NOTES
04/23/22 1404   Living Environment   People in Home alone   Current Living Arrangements assisted living   Home Accessibility no concerns   Transportation Anticipated family or friend will provide   Self-Care   Usual Activity Tolerance good   Current Activity Tolerance moderate   Equipment Currently Used at Home walker, rolling  (4WW)   Fall history within last six months no   Activity/Exercise/Self-Care Comment Pt has walk in shower with built in bench, IND with basic ADLs, [facility] staff assist with garbage, housekeeping, and meals; family does laundry and provides transportation   General Information   Onset of Illness/Injury or Date of Surgery 04/22/22   Referring Physician Ivan Emmanuel MD   Patient/Family Therapy Goals Statement (PT) to go home for her birthday   Pertinent History of Current Problem (include personal factors and/or comorbidities that impact the POC) 89 year old female who presents with various symptoms including nausea and vomiting, feeling very tired and weak, symptoms of chills for almost 5 to 6 days.  Upon further questioning she also admitted having episodes of black stools, started about 2 days ago   General Observations Resting in bed, NAD, pt completed blood transfusion   Cognition   Affect/Mental Status (Cognition) WFL   Orientation Status (Cognition) oriented x 4   Follows Commands (Cognition) [facility]   Pain Assessment   Patient Currently in Pain   (reports some stomach pain)   Range of Motion (ROM)   Range of Motion ROM is WFL   Strength (Manual Muscle Testing)   Strength (Manual Muscle Testing) strength is WF   Bed Mobility   Bed Mobility no deficits identified   Transfers   Transfers no deficits identified   Gait/Stairs (Locomotion)   [place] Level (Gait) supervision   Assistive Device (Gait) walker, front-wheeled   Distance in Feet (Required for LE Total Joints) 150   Balance   Balance no deficits were identified   Clinical Impression   Criteria for Skilled Therapeutic Intervention  Evaluation only   PT Diagnosis (PT) at/close to baseline for mobility   Clinical Presentation (PT Evaluation Complexity) Evolving/Changing   Clinical Presentation Rationale clinical judgement   Clinical Decision Making (Complexity) low complexity   Anticipated Equipment Needs at Discharge (PT)   (none anticipated)   Risk & Benefits of therapy have been explained evaluation/treatment results reviewed;care plan/treatment goals reviewed;risks/benefits reviewed;current/potential barriers reviewed;participants voiced agreement with care plan;participants included;patient;son   PT Discharge Planning   PT Discharge Recommendation (DC Rec) home with assist   PT Rationale for DC Rec Pt appears at/close to baseline for mobility; anticipate once pt is medically optimized, will be safe to return home with [facility] staff and family resuming same level of assist as PTA.  Pt on 2L O2 throughout session and maintained O2 sats >90%   PT Brief overview of current status supervision with mobility   Total Evaluation Time   Total Evaluation Time (Minutes) 23

## 2022-04-23 NOTE — PLAN OF CARE
"BP (!) 144/72 (BP Location: Right arm)   Pulse 79   Temp 98.3  F (36.8  C) (Oral)   Resp 16   Ht 1.473 m (4' 10\")   Wt 55.5 kg (122 lb 6.4 oz)   SpO2 91%   BMI 25.58 kg/m      A&O. A1 GBW. On 2L O2 via NC. Regular diet, fair appetite. Denies pain, N/V/D. Has not had a BM today. Transfused one unit of blood today, tolerated well. Blood cultures positive for e. Coli. Will continue with abx, Rocephin. Son at bedside, updated on plan of care.     "

## 2022-04-23 NOTE — PROGRESS NOTES
Cross cover notified of patient with morning labs showing hemoglobin 6.8 down from 7.5 yesterday on admission.  Per H&P has been having melanotic stools so presumably GI blood loss.  Per H&P she would be okay with transfusion.  Vital signs are stable.  -Ordered 1 unit RBCs for transfusion

## 2022-04-23 NOTE — PROGRESS NOTES
[facility]    Medicine Progress Note - Hospitalist Service    Date of Admission:  4/22/2022    Assessment & Plan            Inez Collier is a 89 year old female who presents with various symptoms including nausea and vomiting, feeling very tired and weak, symptoms of chills for almost 5 to 6 days.  Upon further questioning she also admitted having episodes of black stools, started about 2 days ago.    This is a 89-year-old lady with a past medical history significant for CKD likely stage IV, follows up with [facility] nephrology group, hypertension, chronic anemia which is being managed by nephrology and primary care physician, generalized anxiety disorder, dyslipidemia, history of TIA.    She presented to the emergency room because of various symptoms as described above.  Her symptoms of nausea and vomiting started about 5 to 6 days ago, suddenly.  Since then she has been having difficulty keeping anything down.  She feels dehydrated.  She also feels extremely weak and exhausted since the symptoms started. She has been having symptoms of chills.  She has not checked her temperature to see if she had any fever.      She denies any abdominal pain.  Due to frequent vomiting she has some muscle ache in her upper abdomen but no abdominal pain.  Denies any significant diarrhea.  Symptoms started fairly suddenly.  She denies any cough or shortness of breath.  Denies any dysuria or increased frequency.    Initially she did not divulge the symptom of melena.  But when I specifically asked her a couple of times, she admitted having black appearing stools for the last two days.  Occasionally she gets blood with her stools which she attributes to her hemorrhoids.  Otherwise does not get melena.  Denies abdominal pain.  There is no mention of recent EGD in the chart.  Last mention I saw was a normal EGD in 2004 but I was unable to find the actual report.  She is not sure when she last had any  colonoscopy.    She also has a history of chronic anemia which is being managed by her nephrology team.  She has been getting IV iron infusions over the last few months for possible iron deficiency anemia.    Evaluation in the emergency room showed worsening of her CKD suggestive of acute kidney injury.  Also showed worsening of her anemia.  Her urinalysis was also abnormal suggestive of UTI.  She was given IV fluid and IV ceftriaxone.  Being admitted to medicine service.      Problem List:    Addendum:  Notified about Gram Negative bacteremia  Repeat culture in AM  Increase rocephin to 2 grams IV daily      Nausea and vomiting.  Generalized weakness.  Symptoms of chills.  Possible UTI.  - Rather nonspecific symptoms but can potentially be explained by urinary tract infection.  Although she does not have typical urinary symptoms.  - For now treat for possible UTI with IV ceftriaxone and IV fluids.  - If symptoms persist or then alternate diagnosis can be pursued.  - Wait for urine cultures.  Blood culture sent from the ER.  - Other cause such as peptic ulcer disease/dyspepsia or other GI cause is possible.  CT abdomen without contrast was unremarkable.  LFTs are normal.  Abdominal exam is nonfocal.  Patient is not interested in pursuing any exhaustive work-up and she reiterated again this instructions and preference to me this morning    Acute on chronic anemia.  Now requiring packed RBC transfusion  - Predominantly, her anemia is thought to be anemia of chronic disease in the setting of CKD.  - Her hemoglobin has gradually declined over the last 1 year.  This is despite getting IV iron infusion in the recent past.  - This could be gradual decline of her chronic anemia in the setting of anemia of chronic disease.  - But given recent history of melena, this could be due to GI blood loss as well.  - Earlier my partnerI had a lengthy discussion with the patient and patient's son Jean Paul who is in the room.  Patient wants  to feel better but on the other hand does not want to proceed with exhaustive work-up.  At this point she is not interested in getting EGD or colonoscopy unless absolutely necessary.  But she is agreeable to basic blood work.  - She is okay with proceeding EGD if she develops life-threatening bleeding but otherwise she would like to avoid it if possible.    Symptoms of melena.  - She noticed blackish appearing stools for the last 2 days.  Unsure if she had melena this morning or not.  No similar symptoms in the recent past.  - But her stool occult blood was negative, done by ER physician.  Did not appear to have melena in the ER.  - Does not appear to have brisk life-threatening bleeding.  - Earlier my partner discussed various possibilities with the patient and patient's son.  This could be due to gastritis/peptic ulcer disease which would be relatively benign.  But also discussed that this could be due to something for more serious such as malignancy.  Also discussed investigation options which would mainly be upper GI endoscopy.  - As above, at this point patient is not interested in pursuing invasive procedures such as endoscopy unless it is done for a life-threatening issue.  - She would rather discuss the investigation options with her primary care physician and with her nephrology team in the outpatient setting.  Clinically she is otherwise stable without evidence of significant bleeding.  - Start the patient on twice daily PPI.  - Continue hemoglobin monitoring.  Patient is willing to undergo transfusion if necessary.    -Her hemoglobin this morning is 6.8 and she consented and agreed for packed RBC transfusion.  We will start with 1 unit today  -Recheck CBC in the morning  -No further need to recheck hemoglobin tonight unless with bleeding tendencies    - Clinically she appears volume depleted.  I think her hemoglobin will drop as we rehydrate her.    Acute kidney injury in the setting of CKD.  - This is  likely due to her nausea and vomiting, poor oral intake.  - IV fluid hydration.  Monitor BMP.  Baseline creatinine close to 2.8  -Stop IV fluids while receiving blood transfusion    Hyponatremia-improved and resolved  - Due to volume depletion.  IV fluid rehydration.  Monitor.    Hypertension  - Resume blood pressure medications once med list is reconciled.  -I resume her amlodipine and carvedilol, holding BP parameters  -Held her losartan given worsening kidney function    COVID negative. Vaccinated with one booster.    DVT Prophylaxis: Pneumatic Compression Devices  Code Status: DNR / DNI.            Diet: Combination Diet Regular Diet Adult    DVT Prophylaxis: Pneumatic Compression Devices  Parson Catheter: Not present  Central Lines: None  Cardiac Monitoring: None  Code Status: No CPR- Do NOT Intubate      Disposition Plan   Expected Discharge: 04/25/2022     Anticipated discharge location:  Awaiting care coordination huddle  Delays:            The patient's care was discussed with the Patient and Patient's Family.  As patient's son present at bedside during encounter    Stef Barry MD, MD  Hospitalist Service  [facility]  Securely message with the Vocera Web Console (learn more here)  Text page via [employer] Paging/Directory         Clinically Significant Risk Factors Present on Admission             # Hypoalbuminemia: Albumin = 2.9 g/dL (Ref range: 3.4 - 5.0 g/dL) on admission, will monitor as appropriate   # Coagulation Defect: INR = 1.16 (Ref range: 0.85 - 1.15) and/or PTT = 36 Seconds (Ref range: 22 - 38 Seconds) on admission, will monitor for bleeding  # Platelet Defect: home medication list includes an antiplatelet medication       ______________________________________________________________________    Interval History   I assume service care today.  Seen and examined.  Chart reviewed.  Case discussed with nursing service.  Family updated at bedside as one of her sons is  present during my encounter  Ms. Wiley is a doing well and currently denies any ongoing symptomatology of chest pain, shortness of breath, coughing spells nor abdominal pain.  No reported recurrence of any vomiting.  No obvious bleeding tendencies such as melanotic stools, coffee-ground emesis or bright red blood per rectum here.  Afebrile.  No mental status changes.  She remained pleasant, conversant and in good spirits.  Following simple verbal instructions.      Data reviewed today: I reviewed all medications, new labs and imaging results over the last 24 hours. I personally reviewed no images or EKG's today.    Physical Exam   Vital Signs: Temp: 98.2  F (36.8  C) Temp src: Oral BP: 122/74 Pulse: 66   Resp: 18 SpO2: 92 % O2 Device: Nasal cannula Oxygen Delivery: 2 LPM  Weight: 122 lbs 6.4 oz  HEENT; Atraumatic, normocephalic, pinkish conjuctiva, pupils bilateral reactive   Skin: warm and moist, no rashes  Lymphatics: no cervical or axillary lymphandenopathy  Lungs: equal chest expansion, clear to auscultation, no wheezes, no stridor, no crackles,   Heart: normal rate, normal rhythm, no rubs or gallops.  Systolic murmur grade 1 out of 6  Abdomen: normal bowel sounds, no tenderness, no peritoneal signs, no guarding  Extremities: no deformities, no edema   Neuro; follow commands, alert and oriented x3, spontaneous speech, coherent, moves all extremities spontaneously  Psych; no hallucination, euthymic mood, not agitated        Data   Recent Labs   Lab 04/23/22  0603 04/22/22  1625   WBC 12.6* 12.0*   HGB 6.8* 7.5*   MCV 89 90   * 141*   INR  --  1.16*    130*   POTASSIUM 3.9 4.4   CHLORIDE 105 101   CO2 15* 17*   BUN 61* 64*   CR 3.43* 3.22*   ANIONGAP 14 12   RIGO 8.6 9.0   GLC 78 90   ALBUMIN  --  2.9*   PROTTOTAL  --  7.0   BILITOTAL  --  0.5   ALKPHOS  --  64   ALT  --  15   AST  --  43   LIPASE  --  133     Recent Results (from the past 24 hour(s))   US Abdomen Limited (RUQ)    Narrative    EXAM: US  ABDOMEN LIMITED  LOCATION: [facility]  DATE/TIME: 4/22/2022 5:38 PM    INDICATION: abd pain  COMPARISON: None.  TECHNIQUE: Limited abdominal ultrasound.    FINDINGS:    GALLBLADDER: Normal. No gallstones, wall thickening, or pericholecystic fluid. Negative sonographic Amado's sign.    BILE DUCTS: No biliary dilatation. The common duct measures 5 mm.    LIVER: Normal parenchyma with smooth contour. No focal mass.    RIGHT KIDNEY: No hydronephrosis. Renal parenchyma appears echogenic with several small scattered cysts the largest measuring 1.1 x 0.9 x 0.8 cm.    PANCREAS: The visualized portions are normal.    No ascites.      Impression    IMPRESSION:  1.  No evidence for gallstones or cholecystitis.  2.  Echogenic right renal parenchyma can be seen with chronic renal disease. Several small cysts. No hydronephrosis.       CT Abdomen Pelvis w/o Contrast    Narrative    EXAM: CT ABDOMEN PELVIS W/O CONTRAST  LOCATION: [facility]  DATE/TIME: 4/22/2022 7:38 PM    INDICATION: Nausea/vomiting, abdominal pain  COMPARISON: Abdominal ultrasound 04/22/2022  TECHNIQUE: CT scan of the abdomen and pelvis was performed without IV contrast. Multiplanar reformats were obtained. Dose reduction techniques were used.  CONTRAST: None.    FINDINGS:   LOWER CHEST: Small bilateral pleural effusions and bibasilar atelectasis. Small esophageal hiatal hernia.    HEPATOBILIARY: Normal.    PANCREAS: Normal.    SPLEEN: Normal.    ADRENAL GLANDS: Normal.    KIDNEYS/BLADDER: No renal calculi or hydronephrosis. Small left renal cyst.    BOWEL: A few diverticula sigmoid colon, without evidence for diverticulitis or bowel obstruction.    LYMPH NODES: Normal.    VASCULATURE: Advanced atherosclerotic disease abdominal aorta and iliac arteries. 3.2 x 2.7 cm infrarenal abdominal aortic aneurysm. Incidental retroaortic left renal vein.    PELVIC ORGANS: Hysterectomy.    MUSCULOSKELETAL: Advanced degenerative  disc disease lumbar spine. Osteopenia.      Impression    IMPRESSION:   1.  Small bilateral pleural effusions and bibasilar atelectasis.  2.  Advanced atherosclerotic disease with 3.2 x 2.7 cm infrarenal abdominal aortic aneurysm.  3.  Sigmoid diverticulosis.  4.  No evidence for bowel obstruction or other findings to account for the patient's symptoms.       Medications     lactated ringers         cefTRIAXone  1 g Intravenous Q24H     pantoprazole (PROTONIX) IV  40 mg Intravenous BID     sodium chloride (PF)  3 mL Intracatheter Q8H

## 2022-04-23 NOTE — PLAN OF CARE
Pt is alert and oriented. No C/o pain, no stools, LR running at 100ml/hr. 2L NC. SBA with walker and GB. BP (!) 147/89 (BP Location: Left arm, Patient Position: Per self, Cuff Size: Adult Regular)   Pulse 66   Temp 98.5  F (36.9  C) (Oral)   Resp 18   Wt 55.5 kg (122 lb 6.4 oz)   SpO2 96%   BMI 25.58 kg/m    Will continue plan of care, follow labs.

## 2022-04-23 NOTE — ED NOTES
[facility]  ED Nurse Handoff Report    Inez Collier is a 89 year old female   ED Chief complaint: Generalized Weakness  . ED Diagnosis:   Final diagnoses:   Pyelonephritis, acute   Acute renal failure superimposed on chronic kidney disease, unspecified CKD stage, unspecified acute renal failure type (H)   Acute on chronic anemia     Allergies:   Allergies   Allergen Reactions     Lisinopril      Morphine Hcl        Code Status: Full Code  Activity level - Baseline/Home:  states independent with walker however pt states she falls monthly. Activity Level - Current:   Assist X 1. Lift room needed: No. Bariatric: No   Needed: No   Isolation: No. Infection: Not Applicable.     Vital Signs:   Vitals:    04/22/22 1915 04/22/22 1930 04/22/22 2015 04/22/22 2030   BP:   130/68 113/67   Pulse: 66 64 65 66   Resp:       Temp:       TempSrc:       SpO2:  100%         Cardiac Rhythm:  ,   Cardiac  Cardiac Rhythm: Normal sinus rhythm  Pain level:    Patient confused: No. Patient Falls Risk: Yes.   Elimination Status: Has voided   Patient Report - Initial Complaint:    15:48 ED Triage Notes Filed Weakness, decreased appetite x1 week. Tired all the time. Receives iron infusions. Last Hgb check 4 weeks ago     . Focused Assessment:    16:25 Cardiac Chest Pain Assessment - Chest Pain Location:  (Intermittent upper abdominal pain; denies pain currently)  Cardiac Monitoring - EKG Monitoring: Yes  Cardiac Regularity: Regular  Cardiac Rhythm: NSR  KA     16:25 Gastrointestinal Gastrointestinal - Gastrointestinal WDL: nausea and vomiting  Nausea/Vomiting Signs/Symptoms: nausea intermittent  Gastrointestinal Comment: decreased appetite  KA     16:25 Musculoskeletal Musculoskeletal - General Mobility: generalized weakness          Tests Performed: Labs, US, CT. Abnormal Results:   Labs Ordered and Resulted from Time of ED Arrival to Time of ED Departure   INR - Abnormal       Result Value    INR 1.16 (*)     COMPREHENSIVE METABOLIC PANEL - Abnormal    Sodium 130 (*)     Potassium 4.4      Chloride 101      Carbon Dioxide (CO2) 17 (*)     Anion Gap 12      Urea Nitrogen 64 (*)     Creatinine 3.22 (*)     Calcium 9.0      Glucose 90      Alkaline Phosphatase 64      AST 43      ALT 15      Protein Total 7.0      Albumin 2.9 (*)     Bilirubin Total 0.5      GFR Estimate 13 (*)    ROUTINE UA WITH MICROSCOPIC REFLEX TO CULTURE - Abnormal    Color Urine Light Yellow      Appearance Urine Slightly Cloudy (*)     Glucose Urine Negative      Bilirubin Urine Negative      Ketones Urine Trace (*)     Specific Gravity Urine 1.007      Blood Urine Trace (*)     pH Urine 5.5      Protein Albumin Urine 70  (*)     Urobilinogen Urine Normal      Nitrite Urine Negative      Leukocyte Esterase Urine Large (*)     Bacteria Urine Many (*)     WBC Clumps Urine Present (*)     Mucus Urine Present (*)     RBC Urine 4 (*)     WBC Urine 106 (*)     Squamous Epithelials Urine <1     CBC WITH PLATELETS AND DIFFERENTIAL - Abnormal    WBC Count 12.0 (*)     RBC Count 2.71 (*)     Hemoglobin 7.5 (*)     Hematocrit 24.5 (*)     MCV 90      MCH 27.7      MCHC 30.6 (*)     RDW 13.9      Platelet Count 141 (*)     % Neutrophils 84      % Lymphocytes 6      % Monocytes 10      % Eosinophils 0      % Basophils 0      % Immature Granulocytes 0      NRBCs per 100 WBC 0      Absolute Neutrophils 10.0 (*)     Absolute Lymphocytes 0.7 (*)     Absolute Monocytes 1.1      Absolute Eosinophils 0.0      Absolute Basophils 0.0      Absolute Immature Granulocytes 0.0      Absolute NRBCs 0.0     PARTIAL THROMBOPLASTIN TIME - Normal    aPTT 36     LIPASE - Normal    Lipase 133     TROPONIN I - Normal    Troponin I High Sensitivity 14     OCCULT BLOOD STOOL - Normal    Occult Blood Negative     COVID-19 VIRUS (CORONAVIRUS) BY PCR   TYPE AND SCREEN, ADULT    ABO/RH(D) O POS      Antibody Screen Negative      SPECIMEN EXPIRATION DATE [id number]     URINE CULTURE    BLOOD CULTURE   BLOOD CULTURE   ABO/RH TYPE AND SCREEN     CT Abdomen Pelvis w/o Contrast   Final Result   IMPRESSION:    1.  Small bilateral pleural effusions and bibasilar atelectasis.   2.  Advanced atherosclerotic disease with 3.2 x 2.7 cm infrarenal abdominal aortic aneurysm.   3.  Sigmoid diverticulosis.   4.  No evidence for bowel obstruction or other findings to account for the patient's symptoms.         US Abdomen Limited (RUQ)   Final Result   IMPRESSION:   1.  No evidence for gallstones or cholecystitis.   2.  Echogenic right renal parenchyma can be seen with chronic renal disease. Several small cysts. No hydronephrosis.              .   Treatments provided: See MAR  Family Comments: Son at bedside  OBS brochure/video discussed/provided to patient:  N/A  ED Medications:   Medications   ondansetron (ZOFRAN-ODT) ODT tab 4 mg (has no administration in time range)   cefTRIAXone (ROCEPHIN) 1 g vial to attach to  mL bag for ADULTS or NS 50 mL bag for PEDS (1 g Intravenous New Bag 4/22/22 2049)   sodium chloride 0.9% infusion ( Intravenous New Bag 4/22/22 2048)     Drips infusing:  No  For the majority of the shift, the patient's behavior Green.     Sepsis treatment initiated: No     Patient tested for COVID 19 prior to admission: YES    ED Nurse Name/Phone Number: Emi Solorzano RN,   9:11 PM  RECEIVING UNIT ED HANDOFF REVIEW    Above ED Nurse Handoff Report was reviewed: Yes  Reviewed by: Tate Gunter RN on April 22, 2022 at 10:19 PM

## 2022-04-23 NOTE — H&P
[facility]    HISTORY AND PHYSICAL    (Hospitalist Service)       Date of Admission:  4/22/2022    Assessment & Plan      Inez Collier is a 89 year old female who presents with various symptoms including nausea and vomiting, feeling very tired and weak, symptoms of chills for almost 5 to 6 days.  Upon further questioning she also admitted having episodes of black stools, started about 2 days ago.    This is a 89-year-old lady with a past medical history significant for CKD likely stage IV, follows up with [facility] nephrology group, hypertension, chronic anemia which is being managed by nephrology and primary care physician, generalized anxiety disorder, dyslipidemia, history of TIA.    She presented to the emergency room because of various symptoms as described above.  Her symptoms of nausea and vomiting started about 5 to 6 days ago, suddenly.  Since then she has been having difficulty keeping anything down.  She feels dehydrated.  She also feels extremely weak and exhausted since the symptoms started. She has been having symptoms of chills.  She has not checked her temperature to see if she had any fever.      She denies any abdominal pain.  Due to frequent vomiting she has some muscle ache in her upper abdomen but no abdominal pain.  Denies any significant diarrhea.  Symptoms started fairly suddenly.  She denies any cough or shortness of breath.  Denies any dysuria or increased frequency.    Initially she did not divulge the symptom of melena.  But when I specifically asked her a couple of times, she admitted having black appearing stools for the last two days.  Occasionally she gets blood with her stools which she attributes to her hemorrhoids.  Otherwise does not get melena.  Denies abdominal pain.  There is no mention of recent EGD in the chart.  Last mention I saw was a normal EGD in 2004 but I was unable to find the actual report.  She is not sure when she last had any  colonoscopy.    She also has a history of chronic anemia which is being managed by her nephrology team.  She has been getting IV iron infusions over the last few months for possible iron deficiency anemia.    Evaluation in the emergency room showed worsening of her CKD suggestive of acute kidney injury.  Also showed worsening of her anemia.  Her urinalysis was also abnormal suggestive of UTI.  She was given IV fluid and IV ceftriaxone.  Being admitted to medicine service.      Problem List:    Nausea and vomiting.  Generalized weakness.  Symptoms of chills.  Possible UTI.  - Rather nonspecific symptoms but can potentially be explained by urinary tract infection.  Although she does not have typical urinary symptoms.  - For now treat for possible UTI with IV ceftriaxone and IV fluids.  - If symptoms persist or then alternate diagnosis can be pursued.  - Wait for urine cultures.  Blood culture sent from the ER.  - Other cause such as peptic ulcer disease/dyspepsia or other GI cause is possible.  CT abdomen without contrast was unremarkable.  LFTs are normal.  Abdominal exam is nonfocal.  Patient is not interested in pursuing any exhaustive work-up.    Acute on chronic anemia.  - Predominantly, her anemia is thought to be anemia of chronic disease in the setting of CKD.  - Her hemoglobin has gradually declined over the last 1 year.  This is despite getting IV iron infusion in the recent past.  - This could be gradual decline of her chronic anemia in the setting of anemia of chronic disease.  - But given recent history of melena, this could be due to GI blood loss as well.  - I had a lengthy discussion with the patient and patient's son Jean Paul who is in the room.  Patient wants to feel better but on the other hand does not want to proceed with exhaustive work-up.  At this point she is not interested in getting EGD or colonoscopy unless absolutely necessary.  But she is agreeable to basic blood work.  - Check vitamin B12  and folic acid levels.  Recheck ferritin and iron studies as well with the morning labs.  Reticulocyte count.  - She is okay with proceeding EGD if she develops life-threatening bleeding but otherwise she would like to avoid it if possible.    Symptoms of melena.  - She noticed blackish appearing stools for the last 2 days.  Unsure if she had melena this morning or not.  No similar symptoms in the recent past.  - But her stool occult blood was negative, done by ER physician.  Did not appear to have melena in the ER.  - Does not appear to have brisk life-threatening bleeding.  - I discussed various possibilities with the patient and patient's son.  This could be due to gastritis/peptic ulcer disease which would be relatively benign.  But also discussed that this could be due to something for more serious such as malignancy.  Also discussed investigation options which would mainly be upper GI endoscopy.  - As above, at this point patient is not interested in pursuing invasive procedures such as endoscopy unless it is done for a life-threatening issue.  - She would rather discuss the investigation options with her primary care physician and with her nephrology team in the outpatient setting.  Clinically she is otherwise stable without evidence of significant bleeding.  - Start the patient on twice daily PPI.  - Continue hemoglobin monitoring.  Patient is willing to undergo transfusion if necessary.  - Clinically she appears volume depleted.  I think her hemoglobin will drop as we rehydrate her.    Acute kidney injury in the setting of CKD.  - This is likely due to her nausea and vomiting, poor oral intake.  - IV fluid hydration.  Monitor BMP.  Baseline creatinine close to 2.8    Hyponatremia  - Due to volume depletion.  IV fluid rehydration.  Monitor.    Hypertension  - Resume blood pressure medications once med list is reconciled.    COVID negative. Vaccinated with one booster.    DVT Prophylaxis: Pneumatic  Compression Devices  Code Status: DNR / DNI.  Discussed with the patient in front of her son.    Ivan Emmanuel MD    Primary Care Physician   [facility]    Chief Complaint    nausea and vomiting, feeling very tired and weak, symptoms of chills      History of Present Illness   Inez Collier is a 89 year old female presented with  nausea and vomiting, feeling very tired and weak, symptoms of chills      Past Medical History    I have reviewed this patient's medical history and updated it with pertinent information if needed.   Past Medical History:   Diagnosis Date     Asthma      Chronic kidney disease      Colitis      Hypertension        Past Surgical History   I have reviewed this patient's surgical history and updated it with pertinent information if needed.  Past Surgical History:   Procedure Laterality Date     GYN SURGERY      hysterectomy       Prior to Admission Medications   Prior to Admission Medications   Prescriptions Last Dose Informant Patient Reported? Taking?   CALCIUM-VITAMIN D PO   Yes No   Sig: Take 1 tablet by mouth daily   albuterol (PROAIR HFA/PROVENTIL HFA/VENTOLIN HFA) 108 (90 Base) MCG/ACT inhaler   Yes No   Sig: Inhale 2 puffs into the lungs every 6 hours   albuterol (PROVENTIL) (2.5 MG/3ML) 0.083% neb solution   No No   Sig: Take 1 vial (2.5 mg) by nebulization every 6 hours as needed for shortness of breath / dyspnea or wheezing   amLODIPine (NORVASC) 5 MG tablet   Yes No   Sig: Take 5 mg by mouth daily   aspirin (ASA) 81 MG tablet   Yes No   Sig: Take 81 mg by mouth daily   atorvastatin (LIPITOR) 40 MG tablet   Yes No   Sig: Take 40 mg by mouth daily   carvedilol (COREG) 25 MG tablet   No No   Sig: Take 1 tablet (25 mg) by mouth 2 times daily (with meals)   dextromethorphan (TUSSIN COUGH) 15 MG/5ML syrup   No No   Sig: Take 10 mLs (30 mg) by mouth 4 times daily as needed for cough   fluticasone (ARNUITY ELLIPTA) 200 MCG/ACT inhaler   Yes No   Sig: Inhale 1 puff into the  lungs daily   folic acid (FOLVITE) 400 MCG tablet   Yes No   Sig: Take 400 mcg by mouth daily    gabapentin (NEURONTIN) 100 MG capsule   Yes No   Sig: Take 100 mg by mouth 4 times daily    glucosamine-chondroitin 500-400 MG CAPS   Yes No   Sig: Take 1 capsule by mouth 2 times daily.   losartan (COZAAR) 50 MG tablet   Yes No   Sig: Take 100 mg by mouth daily    meclizine (ANTIVERT) 12.5 MG tablet   No No   Sig: Take 2 tablets (25 mg) by mouth 4 times daily as needed for dizziness   ondansetron (ZOFRAN-ODT) 4 MG ODT tab   No No   Sig: Take 1 tablet (4 mg) by mouth every 8 hours as needed for nausea   pantoprazole (PROTONIX) 40 MG enteric coated tablet   Yes No   Sig: Take 40 mg by mouth 2 times daily    predniSONE (DELTASONE) 10 MG tablet   No No   Sig: By mouth, take 3 tabs daily for 2 days, then 2 tabs daily for 2 days, then 1 tab daily for 2 days, then stop.   sertraline (ZOLOFT) 100 MG tablet   Yes No   Sig: Take 100 mg by mouth daily       Facility-Administered Medications: None     Allergies   Allergies   Allergen Reactions     Lisinopril      Morphine Hcl        Social History   I have reviewed this patient's social history and updated it with pertinent information if needed. Inez RAMACHANDRAN Amira  reports that she quit smoking about 50 years ago. She does not have any smokeless tobacco history on file. She reports that she does not drink alcohol and does not use drugs.    Family History   I have reviewed this patient's family history and updated it with pertinent information if needed.   Family History   Problem Relation Age of Onset     Coronary Artery Disease Brother      Colon Cancer Brother      Breast Cancer Sister        Review of Systems   The 10 point Review of Systems is negative other than noted in the HPI or here.     Physical Exam   Temp: 97.8  F (36.6  C) Temp src: Oral BP: 113/67 Pulse: 66   Resp: 18 SpO2: 100 % O2 Device: Nasal cannula Oxygen Delivery: 2 LPM  Vital Signs with Ranges  Temp:  [97.8  F  (36.6  C)-98.2  F (36.8  C)] 97.8  F (36.6  C)  Pulse:  [61-66] 66  Resp:  [18] 18  BP: (105-130)/(54-81) 113/67  SpO2:  [86 %-100 %] 100 %  0 lbs 0 oz    Constitutional: Awake, alert, cooperative, no apparent distress.  Eyes: Conjunctiva and pupils examined and normal.  HEENT: Moist mucous membranes, normal dentition.  Respiratory: Clear to auscultation bilaterally, no crackles or wheezing.  Cardiovascular: Regular rate and rhythm, normal S1 and S2, and no murmur noted.  GI: Soft, non-distended, non-tender, normal bowel sounds.  Skin: No rashes, no cyanosis, no edema.  Musculoskeletal: No joint swelling, erythema or tenderness.  Neurologic: Cranial nerves 2-12 intact, normal strength and sensation.  Psychiatric: Alert, oriented to person, place and time, no obvious anxiety or depression.    Data     Recent Labs   Lab 04/22/22  1625   WBC 12.0*   HGB 7.5*   MCV 90   *   INR 1.16*   *   POTASSIUM 4.4   CHLORIDE 101   CO2 17*   BUN 64*   CR 3.22*   ANIONGAP 12   RIGO 9.0   GLC 90   ALBUMIN 2.9*   PROTTOTAL 7.0   BILITOTAL 0.5   ALKPHOS 64   ALT 15   AST 43   LIPASE 133       Recent Results (from the past 24 hour(s))   US Abdomen Limited (RUQ)    Narrative    EXAM: US ABDOMEN LIMITED  LOCATION: [facility]  DATE/TIME: 4/22/2022 5:38 PM    INDICATION: abd pain  COMPARISON: None.  TECHNIQUE: Limited abdominal ultrasound.    FINDINGS:    GALLBLADDER: Normal. No gallstones, wall thickening, or pericholecystic fluid. Negative sonographic Amado's sign.    BILE DUCTS: No biliary dilatation. The common duct measures 5 mm.    LIVER: Normal parenchyma with smooth contour. No focal mass.    RIGHT KIDNEY: No hydronephrosis. Renal parenchyma appears echogenic with several small scattered cysts the largest measuring 1.1 x 0.9 x 0.8 cm.    PANCREAS: The visualized portions are normal.    No ascites.      Impression    IMPRESSION:  1.  No evidence for gallstones or cholecystitis.  2.  Echogenic right  renal parenchyma can be seen with chronic renal disease. Several small cysts. No hydronephrosis.       CT Abdomen Pelvis w/o Contrast    Narrative    EXAM: CT ABDOMEN PELVIS W/O CONTRAST  LOCATION: [facility]  DATE/TIME: 4/22/2022 7:38 PM    INDICATION: Nausea/vomiting, abdominal pain  COMPARISON: Abdominal ultrasound 04/22/2022  TECHNIQUE: CT scan of the abdomen and pelvis was performed without IV contrast. Multiplanar reformats were obtained. Dose reduction techniques were used.  CONTRAST: None.    FINDINGS:   LOWER CHEST: Small bilateral pleural effusions and bibasilar atelectasis. Small esophageal hiatal hernia.    HEPATOBILIARY: Normal.    PANCREAS: Normal.    SPLEEN: Normal.    ADRENAL GLANDS: Normal.    KIDNEYS/BLADDER: No renal calculi or hydronephrosis. Small left renal cyst.    BOWEL: A few diverticula sigmoid colon, without evidence for diverticulitis or bowel obstruction.    LYMPH NODES: Normal.    VASCULATURE: Advanced atherosclerotic disease abdominal aorta and iliac arteries. 3.2 x 2.7 cm infrarenal abdominal aortic aneurysm. Incidental retroaortic left renal vein.    PELVIC ORGANS: Hysterectomy.    MUSCULOSKELETAL: Advanced degenerative disc disease lumbar spine. Osteopenia.      Impression    IMPRESSION:   1.  Small bilateral pleural effusions and bibasilar atelectasis.  2.  Advanced atherosclerotic disease with 3.2 x 2.7 cm infrarenal abdominal aortic aneurysm.  3.  Sigmoid diverticulosis.  4.  No evidence for bowel obstruction or other findings to account for the patient's symptoms.

## 2022-04-23 NOTE — PROGRESS NOTES
"SPIRITUAL HEALTH SERVICES Progress Note  RH Med/Surg 3    Saw pt Inez Collier per an admission request.  Provided reflective conversation to facilitate the processing of thoughts and feelings.  Her son Mal arrived near the end of our conversation.      Illness Narrative -   o Inez reported that she has been vomiting after she eats and has been found to be \"low on fluids\" and \"low on blood.\"  o Inez shared that she has \"stage five\" kidney disease and has decided that if she needs dialysis she will refuse it.  She reflected that she had two brother who needed dialysis at the end of their lives: one received treatment and the other declined it.  Inez has talked to her children about her decision not to have dialysis.      Distress -   o Inez questioned whether she will become medically worse.  She shared that she would like to live longer but if it is her time to die then she is ready.  Inez, however, does not want to linger in the dying process.    o She talked about the difficult dynamics between her and one of her daughters.  Inez reflected on the ways she has contributed to the dynamics and how she tries to change it.        Coping -   o Inez named her four children as being core to her support network.  o Her Sabianism mae is an important aspect of her life.  She is affiliated with [facility] in [place].  Inez welcomed prayer.      Meaning-Making - Inez talked about growing up in \"a Adventism family\" and reflected on how she had not instilled her Sabianism mae in her children.  Invited her to reflect on their character (\"they are good people\") and what she might have modeled in her life that influenced them.         Plan - Informed pt how she can request further  support.  This author and other chaplains remain available per pt/family request.    Reji Marcelo M.Div., [facility]  Staff   Phone [phone]  "

## 2022-04-24 ENCOUNTER — APPOINTMENT (OUTPATIENT)
Dept: GENERAL RADIOLOGY | Facility: CLINIC | Age: 87
DRG: 690 | End: 2022-04-24
Attending: HOSPITALIST
Payer: COMMERCIAL

## 2022-04-24 LAB
ANION GAP SERPL CALCULATED.3IONS-SCNC: 10 MMOL/L (ref 3–14)
BACTERIA UR CULT: ABNORMAL
BASOPHILS # BLD AUTO: 0 10E3/UL (ref 0–0.2)
BASOPHILS NFR BLD AUTO: 0 %
BUN SERPL-MCNC: 59 MG/DL (ref 7–30)
CALCIUM SERPL-MCNC: 7.9 MG/DL (ref 8.5–10.1)
CHLORIDE BLD-SCNC: 105 MMOL/L (ref 94–109)
CO2 SERPL-SCNC: 19 MMOL/L (ref 20–32)
CREAT SERPL-MCNC: 3.21 MG/DL (ref 0.52–1.04)
EOSINOPHIL # BLD AUTO: 0.1 10E3/UL (ref 0–0.7)
EOSINOPHIL NFR BLD AUTO: 1 %
ERYTHROCYTE [DISTWIDTH] IN BLOOD BY AUTOMATED COUNT: 14 % (ref 10–15)
GFR SERPL CREATININE-BSD FRML MDRD: 13 ML/MIN/1.73M2
GLUCOSE BLD-MCNC: 121 MG/DL (ref 70–99)
HCT VFR BLD AUTO: 24.3 % (ref 35–47)
HGB BLD-MCNC: 7.8 G/DL (ref 11.7–15.7)
IMM GRANULOCYTES # BLD: 0.1 10E3/UL
IMM GRANULOCYTES NFR BLD: 1 %
LYMPHOCYTES # BLD AUTO: 0.4 10E3/UL (ref 0.8–5.3)
LYMPHOCYTES NFR BLD AUTO: 4 %
MCH RBC QN AUTO: 28.9 PG (ref 26.5–33)
MCHC RBC AUTO-ENTMCNC: 32.1 G/DL (ref 31.5–36.5)
MCV RBC AUTO: 90 FL (ref 78–100)
MONOCYTES # BLD AUTO: 1 10E3/UL (ref 0–1.3)
MONOCYTES NFR BLD AUTO: 9 %
NEUTROPHILS # BLD AUTO: 8.9 10E3/UL (ref 1.6–8.3)
NEUTROPHILS NFR BLD AUTO: 85 %
NRBC # BLD AUTO: 0 10E3/UL
NRBC BLD AUTO-RTO: 0 /100
PLATELET # BLD AUTO: 131 10E3/UL (ref 150–450)
POTASSIUM BLD-SCNC: 3.3 MMOL/L (ref 3.4–5.3)
POTASSIUM BLD-SCNC: 3.5 MMOL/L (ref 3.4–5.3)
RBC # BLD AUTO: 2.7 10E6/UL (ref 3.8–5.2)
SODIUM SERPL-SCNC: 134 MMOL/L (ref 133–144)
WBC # BLD AUTO: 10.5 10E3/UL (ref 4–11)

## 2022-04-24 PROCEDURE — 999N000157 HC STATISTIC RCP TIME EA 10 MIN

## 2022-04-24 PROCEDURE — 84132 ASSAY OF SERUM POTASSIUM: CPT | Performed by: HOSPITALIST

## 2022-04-24 PROCEDURE — 250N000013 HC RX MED GY IP 250 OP 250 PS 637: Performed by: INTERNAL MEDICINE

## 2022-04-24 PROCEDURE — 250N000011 HC RX IP 250 OP 636: Performed by: HOSPITALIST

## 2022-04-24 PROCEDURE — 94640 AIRWAY INHALATION TREATMENT: CPT

## 2022-04-24 PROCEDURE — 36415 COLL VENOUS BLD VENIPUNCTURE: CPT | Performed by: HOSPITALIST

## 2022-04-24 PROCEDURE — 250N000011 HC RX IP 250 OP 636: Performed by: INTERNAL MEDICINE

## 2022-04-24 PROCEDURE — 250N000013 HC RX MED GY IP 250 OP 250 PS 637: Performed by: HOSPITALIST

## 2022-04-24 PROCEDURE — 36415 COLL VENOUS BLD VENIPUNCTURE: CPT | Performed by: INTERNAL MEDICINE

## 2022-04-24 PROCEDURE — 71045 X-RAY EXAM CHEST 1 VIEW: CPT

## 2022-04-24 PROCEDURE — 99233 SBSQ HOSP IP/OBS HIGH 50: CPT | Performed by: HOSPITALIST

## 2022-04-24 PROCEDURE — 258N000003 HC RX IP 258 OP 636: Performed by: INTERNAL MEDICINE

## 2022-04-24 PROCEDURE — 87040 BLOOD CULTURE FOR BACTERIA: CPT | Performed by: INTERNAL MEDICINE

## 2022-04-24 PROCEDURE — 85025 COMPLETE CBC W/AUTO DIFF WBC: CPT | Performed by: INTERNAL MEDICINE

## 2022-04-24 PROCEDURE — C9113 INJ PANTOPRAZOLE SODIUM, VIA: HCPCS | Performed by: INTERNAL MEDICINE

## 2022-04-24 PROCEDURE — 120N000001 HC R&B MED SURG/OB

## 2022-04-24 PROCEDURE — 82310 ASSAY OF CALCIUM: CPT | Performed by: INTERNAL MEDICINE

## 2022-04-24 PROCEDURE — 94640 AIRWAY INHALATION TREATMENT: CPT | Mod: 76

## 2022-04-24 RX ORDER — FUROSEMIDE 10 MG/ML
40 INJECTION INTRAMUSCULAR; INTRAVENOUS ONCE
Status: COMPLETED | OUTPATIENT
Start: 2022-04-24 | End: 2022-04-24

## 2022-04-24 RX ORDER — POTASSIUM CHLORIDE 1500 MG/1
20 TABLET, EXTENDED RELEASE ORAL ONCE
Status: COMPLETED | OUTPATIENT
Start: 2022-04-24 | End: 2022-04-24

## 2022-04-24 RX ADMIN — CARVEDILOL 25 MG: 25 TABLET, FILM COATED ORAL at 09:00

## 2022-04-24 RX ADMIN — ALBUTEROL SULFATE 2 PUFF: 90 AEROSOL, METERED RESPIRATORY (INHALATION) at 14:43

## 2022-04-24 RX ADMIN — PANTOPRAZOLE SODIUM 40 MG: 40 INJECTION, POWDER, FOR SOLUTION INTRAVENOUS at 09:01

## 2022-04-24 RX ADMIN — PANTOPRAZOLE SODIUM 40 MG: 40 INJECTION, POWDER, FOR SOLUTION INTRAVENOUS at 21:47

## 2022-04-24 RX ADMIN — FUROSEMIDE 40 MG: 10 INJECTION, SOLUTION INTRAMUSCULAR; INTRAVENOUS at 12:59

## 2022-04-24 RX ADMIN — SERTRALINE HYDROCHLORIDE 150 MG: 50 TABLET ORAL at 09:01

## 2022-04-24 RX ADMIN — POTASSIUM CHLORIDE 20 MEQ: 1500 TABLET, EXTENDED RELEASE ORAL at 10:14

## 2022-04-24 RX ADMIN — ATORVASTATIN CALCIUM 40 MG: 40 TABLET, FILM COATED ORAL at 09:00

## 2022-04-24 RX ADMIN — CARVEDILOL 25 MG: 25 TABLET, FILM COATED ORAL at 18:51

## 2022-04-24 RX ADMIN — ALBUTEROL SULFATE 2 PUFF: 90 AEROSOL, METERED RESPIRATORY (INHALATION) at 02:09

## 2022-04-24 RX ADMIN — CEFTRIAXONE 2 G: 2 INJECTION, POWDER, FOR SOLUTION INTRAMUSCULAR; INTRAVENOUS at 20:35

## 2022-04-24 RX ADMIN — ALBUTEROL SULFATE 2 PUFF: 90 AEROSOL, METERED RESPIRATORY (INHALATION) at 19:23

## 2022-04-24 RX ADMIN — AMLODIPINE BESYLATE 10 MG: 5 TABLET ORAL at 09:00

## 2022-04-24 RX ADMIN — SODIUM CHLORIDE: 9 INJECTION, SOLUTION INTRAVENOUS at 02:10

## 2022-04-24 ASSESSMENT — ACTIVITIES OF DAILY LIVING (ADL)
ADLS_ACUITY_SCORE: 7

## 2022-04-24 NOTE — PLAN OF CARE
"Vss, no co pain/cp/sob. Alarms on for safety, up with SBA+gb+walker. CXR shows some vascular congestion with no other acute findings, IV lasix given x1. HGB 7.8, Cr 3.21, K+ 3.3 replaced with recheck ordered for 1415. Pt educated on use and importance of IS, using with encouragement. Tolerating a regular diet, IVF dc'd, nephrology following. Possible discharge Monday, checking HGB again in the morning. Continue poc and monitoring.         Problem: Plan of Care - These are the overarching goals to be used throughout the patient stay.    Goal: Plan of Care Review/Shift Note  Description: The Plan of Care Review/Shift note should be completed every shift.  The Outcome Evaluation is a brief statement about your assessment that the patient is improving, declining, or no change.  This information will be displayed automatically on your shift note.  Outcome: Ongoing, Not Progressing  Goal: Patient-Specific Goal (Individualized)  Description: You can add care plan individualizations to a care plan. Examples of Individualization might be:  \"Parent requests to be called daily at 9am for status\", \"I have a hard time hearing out of my right ear\", or \"Do not touch me to wake me up as it startles me\".  Outcome: Ongoing, Not Progressing  Goal: Absence of Hospital-Acquired Illness or Injury  Outcome: Ongoing, Not Progressing  Intervention: Identify and Manage Fall Risk  Recent Flowsheet Documentation  Taken 4/24/2022 1019 by Allegra Cole, RN  Safety Promotion/Fall Prevention:    bed alarm on    safety round/check completed  Taken 4/24/2022 1004 by Allegra Cole, RN  Safety Promotion/Fall Prevention:    bed alarm on    safety round/check completed  Taken 4/24/2022 0909 by Allegra Cole, RN  Safety Promotion/Fall Prevention:    fall prevention program maintained    treat underlying cause    treat reversible contributory factors    supervised activity    safety round/check completed    room organization consistent    room " near nurse's station    room door open    patient and family education    nonskid shoes/slippers when out of bed    mobility aid in reach    lighting adjusted    increase visualization of patient    increased rounding and observation    clutter free environment maintained    activity supervised    assistive device/personal items within reach    bed alarm on  Taken 4/24/2022 0803 by Allegra Cole, RN  Safety Promotion/Fall Prevention:    bed alarm on    safety round/check completed  Taken 4/24/2022 0722 by Allegra Cole RN  Safety Promotion/Fall Prevention:    bed alarm on    safety round/check completed  Intervention: Prevent Skin Injury  Recent Flowsheet Documentation  Taken 4/24/2022 0909 by Allegra Cole, RN  Body Position: position changed independently  Intervention: Prevent and Manage VTE (Venous Thromboembolism) Risk  Recent Flowsheet Documentation  Taken 4/24/2022 0909 by Allegra Cole RN  VTE Prevention/Management: SCDs (sequential compression devices) off  Activity Management:    activity adjusted per tolerance    activity encouraged  Goal: Optimal Comfort and Wellbeing  Outcome: Ongoing, Not Progressing  Goal: Readiness for Transition of Care  Outcome: Ongoing, Not Progressing     Problem: Fluid and Electrolyte Imbalance (Acute Kidney Injury/Impairment)  Goal: Fluid and Electrolyte Balance  Outcome: Ongoing, Not Progressing     Problem: Oral Intake Inadequate (Acute Kidney Injury/Impairment)  Goal: Optimal Nutrition Intake  Outcome: Ongoing, Not Progressing     Problem: Renal Function Impairment (Acute Kidney Injury/Impairment)  Goal: Effective Renal Function  Outcome: Ongoing, Not Progressing  Intervention: Monitor and Support Renal Function  Recent Flowsheet Documentation  Taken 4/24/2022 0909 by Allegra Cole, RN  Medication Review/Management: medications reviewed

## 2022-04-24 NOTE — PLAN OF CARE
A/Ox4. Sleeping between cares. SBA w/ walker. Up to toilet. NS 75 ml/hr. 2 L NC. Hgb: 7.8. Denies pain, N/V. LS: crackles. Repeat culture for Gram Neg Bacteremia this AM, results pending. Continue POC.

## 2022-04-24 NOTE — PROGRESS NOTES
[facility]    Hospitalist Progress Note      Assessment & Plan   Inez Collier is a 89 year old female who presents with various symptoms including nausea and vomiting, feeling very tired and weak, symptoms of chills for almost 5 to 6 days.      #E coli bacteremia secondary to UTI:  Her symptoms of nausea and vomiting started about 5 to 6 days ago.  Since then she has been having difficulty keeping anything down.  She feels dehydrated.  She also feels extremely weak and exhausted since the symptoms started. She has been having symptoms of chills.  She has not checked her temperature to see if she had any fever.  No abdominal or back pain.    -In ER, pt afebrile and HDS. She did have leukocytosis.  UA indicative of infection.  She had CT A/P that showed no acute abnormality.    -Urine culture and blood cultures have come back positive for E coli susceptible to ceftriaxone therapy.  -Continue ceftriaxone therapy. Follow repeat blood cultures    #Acute on chornic anemia: She does have CKD which is contributing but she does describe melena over recent past.  My partner had lengthy discussion with patient and patient's son who wanted to defer EGD at this point and treat conservatively.  She is OK with proceeding with EGD if life-threatening bleeding but wants to avoid it otherwise.  -She was transfused 1 unit pRBC on 4/23 which may have been to dilution in setting of IV hydration.  Hgb on 4/24 appropriately risen.  Monitor daily.   -Continue empiric PPI BID.   -I discussed with patient and son again on 4/24; they affirm that they want to defer GI w/u.    -They plan to f/u with outpatient renal team for discussion of possible EPO.  They have already had 3 outpatient IV iron infusion.     #Acute hypoxemic respiratory failure: Pt requiring 2L via NC to maintain saturation.  She is moving air bilaterally. She does have some atelectasis and small effusion from initial CT scan.    -Will encourage IS. Wean  "O2 as tolerated.  -Will obtain portable CXR.   -Stop IVF    Addendum: CXR with evidence of pulmonary congestion. Likely from IVF, blood transfusion in setting of CKD.  In setting of orthopnea, O2 requirement, will give 40 mg IV lasix once.     #CHRIS on CKD4: Baseline Cr around 2.8 based on care everywhere. Holding losartan for now. Continue coreg and amlodipine.    #Hypokalemia: Replacement    #Hyponatremia: Improved with IVF.     #HTN: Continue coreg and amlodipine    DVT Prophylaxis: Pneumatic Compression Devices  Code Status: No CPR- Do NOT Intubate   Dispo: Likely another day or 2. Continue IV abx for bacteremia. Monitor Hgb tomorrow AM.     Balwinder Hunt MD  Text Page    Interval History   Assumed care. No events. Feeling a bit better each day. Slow improvement. Endorses \"grogginess\" and generally weak.  No chest pain. No recurrent n/v.  No melena overnight.      -Data reviewed today: I reviewed all new labs and imaging results over the last 24 hours.    Physical Exam   Temp: 98.2  F (36.8  C) Temp src: Oral BP: 129/67 Pulse: 67   Resp: 20 SpO2: 95 % O2 Device: Nasal cannula Oxygen Delivery: 2 LPM  Vitals:    04/22/22 2241 04/23/22 0500 04/24/22 0500   Weight: 55.5 kg (122 lb 6.4 oz) 55.5 kg (122 lb 6.4 oz) 57.3 kg (126 lb 6.4 oz)     Vital Signs with Ranges  Temp:  [97.8  F (36.6  C)-98.4  F (36.9  C)] 98.2  F (36.8  C)  Pulse:  [67-81] 67  Resp:  [16-20] 20  BP: (118-149)/(62-76) 129/67  SpO2:  [90 %-97 %] 95 %  I/O last 3 completed shifts:  In: 1194 [P.O.:240; I.V.:654]  Out: -     Constitutional: Elderly, NAD  HEENT: Normocephalic. MMM, No elevation of JVD noted.   Respiratory: Nl WOB, Clear. Some inspiratory crackles at bases. No wheeze.   Cardiovascular: Regular, no murmur  GI: BS+, NT, ND  Skin/Integumen: WWP, no rash. No edema  Neuro: CNII-XII intact. Moves all extremities. No tremor. A&Ox3.    Medications     sodium chloride 75 mL/hr at 04/24/22 0210       albuterol  2 puff Inhalation Q6H     amLODIPine  " 10 mg Oral Daily     atorvastatin  40 mg Oral Daily     carvedilol  25 mg Oral BID w/meals     cefTRIAXone  2 g Intravenous Q24H     fluticasone  1 puff Inhalation Daily     pantoprazole (PROTONIX) IV  40 mg Intravenous BID     sertraline  150 mg Oral Daily     sodium chloride (PF)  3 mL Intracatheter Q8H       Data   Recent Labs   Lab 04/24/22  0605 04/23/22  0603 04/22/22  1625   WBC 10.5 12.6* 12.0*   HGB 7.8* 6.8* 7.5*   MCV 90 89 90   * 139* 141*   INR  --   --  1.16*    134 130*   POTASSIUM 3.3* 3.9 4.4   CHLORIDE 105 105 101   CO2 19* 15* 17*   BUN 59* 61* 64*   CR 3.21* 3.43* 3.22*   ANIONGAP 10 14 12   RIGO 7.9* 8.6 9.0   * 78 90   ALBUMIN  --   --  2.9*   PROTTOTAL  --   --  7.0   BILITOTAL  --   --  0.5   ALKPHOS  --   --  64   ALT  --   --  15   AST  --   --  43   LIPASE  --   --  133       No results found for this or any previous visit (from the past 24 hour(s)).

## 2022-04-24 NOTE — PLAN OF CARE
"Goal Outcome Evaluation:    Plan of Care Reviewed With: patient     Blood pressure (!) 149/72, pulse 80, temperature 97.8  F (36.6  C), temperature source Oral, resp. rate 16, height 1.473 m (4' 10\"), weight 55.5 kg (122 lb 6.4 oz), SpO2 94 %.    VSS. Patient A&Ox4 on 2 LPM of O2 via nasal cannula.  SBA with GB and Walker.  PIV on right running NS at 75 mL per hour.  Received Rocephin.  Blood Culture.  Up to chair for meals.  Ambulated in room.  Fair appetite. Diet Regular. Family took medications they brought home with them.  Patient aware of plan to treat with ABX. Will continue POC.     "

## 2022-04-25 LAB
ANION GAP SERPL CALCULATED.3IONS-SCNC: 10 MMOL/L (ref 3–14)
ATRIAL RATE - MUSE: 63 BPM
BACTERIA BLD CULT: ABNORMAL
BUN SERPL-MCNC: 47 MG/DL (ref 7–30)
CALCIUM SERPL-MCNC: 7.8 MG/DL (ref 8.5–10.1)
CHLORIDE BLD-SCNC: 105 MMOL/L (ref 94–109)
CO2 SERPL-SCNC: 20 MMOL/L (ref 20–32)
CREAT SERPL-MCNC: 3.16 MG/DL (ref 0.52–1.04)
DIASTOLIC BLOOD PRESSURE - MUSE: NORMAL MMHG
ERYTHROCYTE [DISTWIDTH] IN BLOOD BY AUTOMATED COUNT: 13.8 % (ref 10–15)
GFR SERPL CREATININE-BSD FRML MDRD: 13 ML/MIN/1.73M2
GLUCOSE BLD-MCNC: 100 MG/DL (ref 70–99)
HCT VFR BLD AUTO: 25.4 % (ref 35–47)
HGB BLD-MCNC: 8.1 G/DL (ref 11.7–15.7)
INTERPRETATION ECG - MUSE: NORMAL
MCH RBC QN AUTO: 27.8 PG (ref 26.5–33)
MCHC RBC AUTO-ENTMCNC: 31.9 G/DL (ref 31.5–36.5)
MCV RBC AUTO: 87 FL (ref 78–100)
P AXIS - MUSE: 41 DEGREES
PLATELET # BLD AUTO: 134 10E3/UL (ref 150–450)
POTASSIUM BLD-SCNC: 3 MMOL/L (ref 3.4–5.3)
POTASSIUM BLD-SCNC: 3.5 MMOL/L (ref 3.4–5.3)
PR INTERVAL - MUSE: 206 MS
QRS DURATION - MUSE: 140 MS
QT - MUSE: 472 MS
QTC - MUSE: 483 MS
R AXIS - MUSE: -9 DEGREES
RBC # BLD AUTO: 2.91 10E6/UL (ref 3.8–5.2)
SODIUM SERPL-SCNC: 135 MMOL/L (ref 133–144)
SYSTOLIC BLOOD PRESSURE - MUSE: NORMAL MMHG
T AXIS - MUSE: -5 DEGREES
VENTRICULAR RATE- MUSE: 63 BPM
WBC # BLD AUTO: 8.5 10E3/UL (ref 4–11)

## 2022-04-25 PROCEDURE — C9113 INJ PANTOPRAZOLE SODIUM, VIA: HCPCS | Performed by: INTERNAL MEDICINE

## 2022-04-25 PROCEDURE — 99232 SBSQ HOSP IP/OBS MODERATE 35: CPT | Performed by: INTERNAL MEDICINE

## 2022-04-25 PROCEDURE — 250N000011 HC RX IP 250 OP 636: Performed by: INTERNAL MEDICINE

## 2022-04-25 PROCEDURE — 85027 COMPLETE CBC AUTOMATED: CPT | Performed by: HOSPITALIST

## 2022-04-25 PROCEDURE — 36415 COLL VENOUS BLD VENIPUNCTURE: CPT | Performed by: INTERNAL MEDICINE

## 2022-04-25 PROCEDURE — 36415 COLL VENOUS BLD VENIPUNCTURE: CPT | Performed by: HOSPITALIST

## 2022-04-25 PROCEDURE — 94640 AIRWAY INHALATION TREATMENT: CPT | Mod: 76

## 2022-04-25 PROCEDURE — 94640 AIRWAY INHALATION TREATMENT: CPT

## 2022-04-25 PROCEDURE — 250N000013 HC RX MED GY IP 250 OP 250 PS 637: Performed by: INTERNAL MEDICINE

## 2022-04-25 PROCEDURE — 84132 ASSAY OF SERUM POTASSIUM: CPT | Performed by: INTERNAL MEDICINE

## 2022-04-25 PROCEDURE — 999N000157 HC STATISTIC RCP TIME EA 10 MIN

## 2022-04-25 PROCEDURE — 82310 ASSAY OF CALCIUM: CPT | Performed by: HOSPITALIST

## 2022-04-25 PROCEDURE — 120N000001 HC R&B MED SURG/OB

## 2022-04-25 RX ORDER — FUROSEMIDE 10 MG/ML
60 INJECTION INTRAMUSCULAR; INTRAVENOUS ONCE
Status: COMPLETED | OUTPATIENT
Start: 2022-04-25 | End: 2022-04-25

## 2022-04-25 RX ORDER — POTASSIUM CHLORIDE 1500 MG/1
40 TABLET, EXTENDED RELEASE ORAL ONCE
Status: COMPLETED | OUTPATIENT
Start: 2022-04-25 | End: 2022-04-25

## 2022-04-25 RX ADMIN — ALBUTEROL SULFATE 2 PUFF: 90 AEROSOL, METERED RESPIRATORY (INHALATION) at 15:08

## 2022-04-25 RX ADMIN — CARVEDILOL 25 MG: 25 TABLET, FILM COATED ORAL at 08:53

## 2022-04-25 RX ADMIN — POTASSIUM CHLORIDE 40 MEQ: 1500 TABLET, EXTENDED RELEASE ORAL at 08:54

## 2022-04-25 RX ADMIN — ATORVASTATIN CALCIUM 40 MG: 40 TABLET, FILM COATED ORAL at 08:53

## 2022-04-25 RX ADMIN — ALBUTEROL SULFATE 2 PUFF: 90 AEROSOL, METERED RESPIRATORY (INHALATION) at 02:38

## 2022-04-25 RX ADMIN — AMLODIPINE BESYLATE 10 MG: 5 TABLET ORAL at 08:53

## 2022-04-25 RX ADMIN — PANTOPRAZOLE SODIUM 40 MG: 40 INJECTION, POWDER, FOR SOLUTION INTRAVENOUS at 20:36

## 2022-04-25 RX ADMIN — PANTOPRAZOLE SODIUM 40 MG: 40 INJECTION, POWDER, FOR SOLUTION INTRAVENOUS at 10:42

## 2022-04-25 RX ADMIN — FUROSEMIDE 60 MG: 10 INJECTION, SOLUTION INTRAMUSCULAR; INTRAVENOUS at 13:59

## 2022-04-25 RX ADMIN — CEFTRIAXONE 2 G: 2 INJECTION, POWDER, FOR SOLUTION INTRAMUSCULAR; INTRAVENOUS at 20:01

## 2022-04-25 RX ADMIN — SERTRALINE HYDROCHLORIDE 150 MG: 50 TABLET ORAL at 08:53

## 2022-04-25 RX ADMIN — CARVEDILOL 25 MG: 25 TABLET, FILM COATED ORAL at 17:36

## 2022-04-25 RX ADMIN — ALBUTEROL SULFATE 2 PUFF: 90 AEROSOL, METERED RESPIRATORY (INHALATION) at 19:18

## 2022-04-25 ASSESSMENT — ACTIVITIES OF DAILY LIVING (ADL)
ADLS_ACUITY_SCORE: 7
DEPENDENT_IADLS:: CLEANING
ADLS_ACUITY_SCORE: 7

## 2022-04-25 NOTE — PROGRESS NOTES
Care Management Follow Up    Length of Stay (days): 3    Expected Discharge Date: 04/26/2022     Concerns to be Addressed:       Patient plan of care discussed at interdisciplinary rounds: Yes    Anticipated Discharge Disposition: Home     Anticipated Discharge Services:  Housekeeping  Anticipated Discharge DME:  None    Education Provided on the Discharge Plan:  Yes  Patient/Family in Agreement with the Plan:  Yes    Referrals Placed by CM/SW:  None  Private pay costs discussed: Not applicable    Additional Information:  ROBYN received message from Abena WEINSTEIN at [facility] ([phone]). CM returned call. Updated Abena on patient's progress. Patient is currently an assist X 1 w/gait belt & walker. Abena stated she needs to be independent in her room to return to [facility]. She is currently requiring supplemental oxygen at 4L/min. Staff is attempting to wean this down. CM will keep Abena updated on status.     RN CC will continue to follow for discharge planning.    Kait Dobson RN, BSN, CPHN, CM  Inpatient Care Coordination - [facility]  [phone]

## 2022-04-25 NOTE — CONSULTS
Care Management Initial Consult    General Information  Assessment completed with: Patient, Family, Inez & Cinthia LONG  Type of CM/SW Visit: Initial Assessment    Primary Care Provider verified and updated as needed: Yes   Readmission within the last 30 days: no previous admission in last 30 days   Return Category: New Diagnosis  Reason for Consult: care coordination/care conference, discharge planning  Advance Care Planning: Advance Care Planning Reviewed: no concerns identified        Communication Assessment  Patient's communication style: spoken language (English or Bilingual)    Hearing Difficulty or Deaf: no   Wear Glasses or Blind: yes (yes)    Cognitive  Cognitive/Neuro/Behavioral: WDL        Orientation: oriented x 4             Living Environment:   People in home: facility resident     Current living Arrangements: assisted living  Name of Facility: [facility]   Able to return to prior arrangements: yes     Family/Social Support:  Care provided by: other (see comments) (staff)  Provides care for:    Marital Status:   Children, Facility resident(s)/Staff          Description of Support System: Supportive, Involved       Current Resources:   Patient receiving home care services: No     Community Resources: None  Equipment currently used at home: grab bar, toilet, grab bar, tub/shower, shower chair, walker, rolling  Supplies currently used at home: None    Employment/Financial:  Employment Status: retired        Financial Concerns: No concerns identified     Lifestyle & Psychosocial Needs:  Social Determinants of Health     Tobacco Use: Not on file   Alcohol Use: Not on file   Financial Resource Strain: Not on file   Food Insecurity: Not on file   Transportation Needs: Not on file   Physical Activity: Not on file   Stress: Not on file   Social Connections: Not on file   Intimate Partner Violence: Not on file   Depression: Not on file   Housing Stability: Not on file     Functional Status:  Prior to  admission patient needed assistance:   Dependent ADLs:: Ambulation-walker  Dependent IADLs:: Cleaning     Mental Health Status:  Mental Health Status: No Current Concerns       Chemical Dependency Status:  Chemical Dependency Status: No Current Concerns           Values/Beliefs:  Spiritual, Cultural Beliefs, Christian Practices, Values that affect care: no             Additional Information:  Patient admitted with UTI, E. Coli bacteremia and CKD. She has an unplanned readmission risk of >20%. CM met with patient and daughter-in-law, Cinthia, at bedside. Introduced self & role. Verified she lives at [facility]. She is retired. She has a4WW, grab bars and a shower chair at home. She has no issues with her medications and gets her prescriptions filled at [facility] in [place]. She has cleaning services and a daily wellness check. She has a good support system of her sons and daughters. One of her children can transport her back to [facility] at discharge. She will schedule her own follow up appointment with [facility] in [place] after discharge. Her DIL Cinthia plans to stay with her at [facility] after discharge.     RN CC will continue to follow for discharge planning.    Kait Dobson RN, BSN, CPHN, CM  Inpatient Care Coordination - [facility]3/[facility]  [phone]

## 2022-04-25 NOTE — PROGRESS NOTES
[facility]    Medicine Progress Note - Hospitalist Service    Date of Admission:  4/22/2022    Assessment & Plan            Inez Collier is a 89 year old female who presents with various symptoms including nausea and vomiting, feeling very tired and weak, symptoms of chills for almost 5 to 6 days.   She was diagnosed with a UTI secondary to E. coli with bacteremia.  This been complicated by generalized weakness, hypoxia and acute on chronic anemia.    She turned 89 years old today.     #E coli bacteremia secondary to UTI:  Her symptoms of nausea and vomiting started about 5 to 6 prior to admission.  She also felt extremely weak and exhausted since the symptoms started.  She had chills but did not check her temperature.    -In ER, pt afebrile. She did have leukocytosis.  UA indicative of infection.  She had CT A/P that showed no acute abnormality.    -Urine culture and blood cultures have come back positive for E coli susceptible to ceftriaxone.  -Repeat blood culture negative thus far.  -Ceftriaxone started 4/22.  -We will switch to Bactrim at discharge to complete a 10-day course of antibiotics.     #Acute on chornic anemia: She does have CKD which is contributing but she does describe melena over recent past.  My partner had lengthy discussion with patient and patient's son who wanted to defer EGD at this point and treat conservatively.  She is OK with proceeding with EGD if life-threatening bleeding but wants to avoid it otherwise.  -She was transfused 1 unit pRBC on 4/23 which may have been to dilution in setting of IV hydration.  Hgb on 4/24 appropriately risen.  Monitor daily.   -Continue empiric PPI BID.   - patient and son again on 4/24 affirmed that they want to defer GI w/u.    -They plan to f/u with outpatient renal team for discussion of possible EPO.  They have already had 3 outpatient IV iron infusion.      #Acute hypoxemic respiratory failure: Pt requiring 4 L via NC to maintain  "saturation.  She is moving air bilaterally. She does have some atelectasis and small effusion from initial CT scan.    -Chest x-ray yesterday showed vascular congestion, likely secondary to IV hydration and blood transfusion.  -She got a dose of IV Lasix yesterday.   -We will give another dose today to see if this improves her oxygenation.  --Try to wean off oxygen.  She was not on oxygen prior to admission.      #Generalized weakness:  -- due to acute illness  --Family plan to stay with her 24/7 until her strength improves.  She lives in assisted living facility.     #CHRIS on CKD4: Baseline Cr around 2.8 based on care everywhere.  -- Creatinine improving to 3.1 from a peak of 3.4.  --  Holding losartan for now. Continue coreg and amlodipine.  --We will monitor as she undergoes diuresis.    #Hypokalemia: Careful replacement in the setting of CKD.     #Hyponatremia: Improved with IVF.      #HTN: Continue coreg and amlodipine     DVT Prophylaxis: Pneumatic Compression Devices  Code Status: No CPR- Do NOT Intubate        Diet: Combination Diet Regular Diet Adult    DVT Prophylaxis: Pneumatic Compression Devices  Parson Catheter: Not present  Central Lines: None  Cardiac Monitoring: None  Code Status: No CPR- Do NOT Intubate      Disposition Plan   Expected Discharge: 04/26/2022     Anticipated discharge location:  Home with family assistance 24/7       The patient's care was discussed with the Patient and her son..    Kwadwo Cristina MD  Hospitalist Service  [facility]  Securely message with the Vocera Web Console (learn more here)  Text page via [employer] Paging/Directory         Clinically Significant Risk Factors Present on Admission             # Overweight: Estimated body mass index is 25.77 kg/m  as calculated from the following:    Height as of this encounter: 1.473 m (4' 10\").    Weight as of this encounter: 55.9 kg (123 lb 4.8 oz).  "     ______________________________________________________________________    Interval History   Feels ok.  Strength improving.  Walking but likes to have help near her.        Physical Exam   Vital Signs: Temp: 98  F (36.7  C) Temp src: Oral BP: 129/76 Pulse: 74   Resp: 20 SpO2: 95 % O2 Device: Nasal cannula Oxygen Delivery: 4 LPM  Weight: 123 lbs 4.8 oz    Vital signs reviewed  General:  Alert, calm, NAD  CV: regular rate and rhythm, no murmurs or rubs  Lungs:  Clear to ascultation bilaterally, normal respiratory effort  HEENT:  Pupil round, equal, conjuctivae, sclerae and lids normal, neck is supple  Abdomen:  Soft, nontender, nondistended, no masses, normal bowel sounds  Extremities:  No edema  Neuro: normal strength and sensation in all 4 extremities, cranial nerves grossly intact  Psychiatric:  Mood and affect within normal limits      Data   Hemoglobin 8.1 from 7.8 after transfusion of 1 unit from 6.8  Creatinine 3.1 from 3.2 from 3.4

## 2022-04-25 NOTE — PLAN OF CARE
A/Ox4. Assist 1 w/ gait/walker. 4 L NC. R PIV saline locked. Up to toilet x4. BM x2, black stool per pt. Hgb: 8.1. VSS except BP: 139/96. Possible discharge in a day or 2. Continue POC.

## 2022-04-25 NOTE — PLAN OF CARE
VSS. A&Ox4 on O2 at 4 lpm via NC.  Rocephin and Protonix provided. LS clear. SBA with GB/W.  Potassium 3.5 recheck in the morning. Visited with family through shift.   Denies pain.  Possible discharge on Monday.  Continue with POC.

## 2022-04-25 NOTE — PLAN OF CARE
Patient alert and oriented, up with SBA to the bathroom. Denies pain. Weaned down to 3L today from 4L, but still not able to go down lower. Patient encouraged to use IS, able to tolerate it up to 1200ml. Lung sounds clear. VSS. SW following for discharge planning, continue with current plan of care.

## 2022-04-26 LAB
ANION GAP SERPL CALCULATED.3IONS-SCNC: 9 MMOL/L (ref 3–14)
BUN SERPL-MCNC: 44 MG/DL (ref 7–30)
CALCIUM SERPL-MCNC: 7.6 MG/DL (ref 8.5–10.1)
CHLORIDE BLD-SCNC: 103 MMOL/L (ref 94–109)
CO2 SERPL-SCNC: 24 MMOL/L (ref 20–32)
CREAT SERPL-MCNC: 3.01 MG/DL (ref 0.52–1.04)
GFR SERPL CREATININE-BSD FRML MDRD: 14 ML/MIN/1.73M2
GLUCOSE BLD-MCNC: 101 MG/DL (ref 70–99)
HGB BLD-MCNC: 8.6 G/DL (ref 11.7–15.7)
POTASSIUM BLD-SCNC: 3.1 MMOL/L (ref 3.4–5.3)
SODIUM SERPL-SCNC: 136 MMOL/L (ref 133–144)

## 2022-04-26 PROCEDURE — 250N000011 HC RX IP 250 OP 636: Performed by: INTERNAL MEDICINE

## 2022-04-26 PROCEDURE — 250N000013 HC RX MED GY IP 250 OP 250 PS 637: Performed by: INTERNAL MEDICINE

## 2022-04-26 PROCEDURE — 99232 SBSQ HOSP IP/OBS MODERATE 35: CPT | Performed by: INTERNAL MEDICINE

## 2022-04-26 PROCEDURE — 85018 HEMOGLOBIN: CPT | Performed by: INTERNAL MEDICINE

## 2022-04-26 PROCEDURE — 80048 BASIC METABOLIC PNL TOTAL CA: CPT | Performed by: INTERNAL MEDICINE

## 2022-04-26 PROCEDURE — 94640 AIRWAY INHALATION TREATMENT: CPT | Mod: 76

## 2022-04-26 PROCEDURE — 120N000001 HC R&B MED SURG/OB

## 2022-04-26 PROCEDURE — 94640 AIRWAY INHALATION TREATMENT: CPT

## 2022-04-26 PROCEDURE — 999N000157 HC STATISTIC RCP TIME EA 10 MIN

## 2022-04-26 PROCEDURE — C9113 INJ PANTOPRAZOLE SODIUM, VIA: HCPCS | Performed by: INTERNAL MEDICINE

## 2022-04-26 PROCEDURE — 36415 COLL VENOUS BLD VENIPUNCTURE: CPT | Performed by: INTERNAL MEDICINE

## 2022-04-26 RX ORDER — FUROSEMIDE 10 MG/ML
40 INJECTION INTRAMUSCULAR; INTRAVENOUS ONCE
Status: COMPLETED | OUTPATIENT
Start: 2022-04-26 | End: 2022-04-26

## 2022-04-26 RX ORDER — POTASSIUM CHLORIDE 1.5 G/1.58G
20 POWDER, FOR SOLUTION ORAL ONCE
Status: COMPLETED | OUTPATIENT
Start: 2022-04-26 | End: 2022-04-26

## 2022-04-26 RX ORDER — FUROSEMIDE 10 MG/ML
60 INJECTION INTRAMUSCULAR; INTRAVENOUS ONCE
Status: COMPLETED | OUTPATIENT
Start: 2022-04-26 | End: 2022-04-26

## 2022-04-26 RX ORDER — ALBUTEROL SULFATE 90 UG/1
2 AEROSOL, METERED RESPIRATORY (INHALATION) 3 TIMES DAILY
Status: DISCONTINUED | OUTPATIENT
Start: 2022-04-26 | End: 2022-04-27 | Stop reason: HOSPADM

## 2022-04-26 RX ORDER — POTASSIUM CHLORIDE 1.5 G/1.58G
40 POWDER, FOR SOLUTION ORAL ONCE
Status: COMPLETED | OUTPATIENT
Start: 2022-04-26 | End: 2022-04-26

## 2022-04-26 RX ADMIN — FLUTICASONE FUROATE 1 PUFF: 200 POWDER RESPIRATORY (INHALATION) at 08:06

## 2022-04-26 RX ADMIN — CARVEDILOL 25 MG: 25 TABLET, FILM COATED ORAL at 09:14

## 2022-04-26 RX ADMIN — ALBUTEROL SULFATE 2 PUFF: 90 AEROSOL, METERED RESPIRATORY (INHALATION) at 08:06

## 2022-04-26 RX ADMIN — POTASSIUM CHLORIDE 40 MEQ: 1.5 POWDER, FOR SOLUTION ORAL at 09:13

## 2022-04-26 RX ADMIN — POTASSIUM CHLORIDE 20 MEQ: 1.5 POWDER, FOR SOLUTION ORAL at 09:13

## 2022-04-26 RX ADMIN — FUROSEMIDE 40 MG: 10 INJECTION, SOLUTION INTRAMUSCULAR; INTRAVENOUS at 16:24

## 2022-04-26 RX ADMIN — ALBUTEROL SULFATE 2 PUFF: 90 INHALANT RESPIRATORY (INHALATION) at 22:45

## 2022-04-26 RX ADMIN — CEFTRIAXONE 2 G: 2 INJECTION, POWDER, FOR SOLUTION INTRAMUSCULAR; INTRAVENOUS at 20:42

## 2022-04-26 RX ADMIN — AMLODIPINE BESYLATE 10 MG: 5 TABLET ORAL at 09:14

## 2022-04-26 RX ADMIN — FUROSEMIDE 60 MG: 10 INJECTION, SOLUTION INTRAMUSCULAR; INTRAVENOUS at 09:11

## 2022-04-26 RX ADMIN — ALBUTEROL SULFATE 2 PUFF: 90 AEROSOL, METERED RESPIRATORY (INHALATION) at 14:53

## 2022-04-26 RX ADMIN — ALBUTEROL SULFATE 2 PUFF: 90 AEROSOL, METERED RESPIRATORY (INHALATION) at 19:51

## 2022-04-26 RX ADMIN — ATORVASTATIN CALCIUM 40 MG: 40 TABLET, FILM COATED ORAL at 09:14

## 2022-04-26 RX ADMIN — PANTOPRAZOLE SODIUM 40 MG: 40 INJECTION, POWDER, FOR SOLUTION INTRAVENOUS at 09:14

## 2022-04-26 RX ADMIN — CARVEDILOL 25 MG: 25 TABLET, FILM COATED ORAL at 18:41

## 2022-04-26 RX ADMIN — PANTOPRAZOLE SODIUM 40 MG: 40 INJECTION, POWDER, FOR SOLUTION INTRAVENOUS at 20:42

## 2022-04-26 RX ADMIN — SERTRALINE HYDROCHLORIDE 150 MG: 50 TABLET ORAL at 09:14

## 2022-04-26 ASSESSMENT — ACTIVITIES OF DAILY LIVING (ADL)
ADLS_ACUITY_SCORE: 7

## 2022-04-26 NOTE — PROGRESS NOTES
[facility]    Medicine Progress Note - Hospitalist Service    Date of Admission:  4/22/2022    Assessment & Plan            Inez Collier is a 89 year old female who presents with various symptoms including nausea and vomiting, feeling very tired and weak, symptoms of chills for almost 5 to 6 days.   She was diagnosed with a UTI secondary to E. coli with bacteremia.  This been complicated by generalized weakness, hypoxia and acute on chronic anemia.       #E coli bacteremia secondary to UTI:  Her symptoms of nausea and vomiting started about 5 to 6 prior to admission.  She also felt extremely weak and exhausted since the symptoms started.  She had chills but did not check her temperature.    -In ER, pt afebrile. She did have leukocytosis.  UA indicative of infection.  She had CT A/P that showed no acute abnormality.    -Urine culture and blood cultures have come back positive for E coli susceptible to ceftriaxone.  -Repeat blood culture negative thus far.  -Ceftriaxone started 4/22.  -We will switch to Bactrim at discharge to complete a 10-day course of antibiotics.     #Acute on chornic anemia: She does have CKD which is contributing but she does describe melena over recent past.  My partner had lengthy discussion with patient and patient's son who wanted to defer EGD at this point and treat conservatively.  She is OK with proceeding with EGD if life-threatening bleeding but wants to avoid it otherwise.  -She was transfused 1 unit pRBC on 4/23 which may have been to dilution in setting of IV hydration.  Hgb on 4/24 appropriately risen.  Monitor daily.   -Continue empiric PPI BID.   - patient and son again on 4/24 affirmed that they want to defer GI w/u.    -They plan to f/u with outpatient renal team for discussion of possible EPO.  They have already had 3 outpatient IV iron infusion.      #Acute hypoxemic respiratory failure:  --Pt now requiring 1 L via NC to maintain saturation down from 4 L  yesterday, after undergoing diuresis  -- She does have some atelectasis and small effusion from initial CT scan.    -Chest x-ray 4/24 showed vascular congestion, likely secondary to IV hydration and blood transfusion.  -She got a dose of 60 mg IV Lasix yesterday and another dose today.  -Down 3 kg in the past 2 days.  -We are hoping to diurese enough to wean her completely off oxygen.  She was not on oxygen prior to admission..    ADDENDUM:  In the afternoon she is still requiring 1 L of oxygen nasal cannula to keep her oxygen saturation above 90%.  We will give 1 more dose of Lasix 40 mg IV x1.  Hopefully this will allow her to diurese enough to get her off of oxygen without negatively affecting her renal function and allow her to discharge tomorrow.        #Generalized weakness:  -- due to acute illness  --Family plan to stay with her 24/7 until her strength improves.  She lives in assisted living facility.     #CHRIS on CKD4: Baseline Cr around 2.8 based on care everywhere.  -- Creatinine improving to 3.0 from a peak of 3.4 despite undergoing diuresis the past couple of days.  --  Holding losartan for now. Continue coreg and amlodipine.  --We will monitor as she undergoes diuresis.    #Hypokalemia:  --Secondary to Lasix.  -- Careful replacement in the setting of CKD.     #Hyponatremia: Improved with IVF.      #HTN: Continue coreg and amlodipine     DVT Prophylaxis: Pneumatic Compression Devices  Code Status: No CPR- Do NOT Intubate        Diet: Combination Diet Regular Diet Adult    DVT Prophylaxis: Pneumatic Compression Devices  Parson Catheter: Not present  Central Lines: None  Cardiac Monitoring: None  Code Status: No CPR- Do NOT Intubate      Disposition Plan   Expected Discharge: Later today or tomorrow if we are able to wean her off of oxygen.  Anticipated discharge location: assisted living Home with family assistance 24/7       The patient's care was discussed with the Patient and her son..    Kwadwo Dickerson  "MD Ibeth  Hospitalist Service  [facility]  Securely message with the [employer] Web Console (learn more here)  Text page via [employer] Paging/Directory         Clinically Significant Risk Factors Present on Admission             # Overweight: Estimated body mass index is 25.02 kg/m  as calculated from the following:    Height as of this encounter: 1.473 m (4' 10\").    Weight as of this encounter: 54.3 kg (119 lb 11.2 oz).      ______________________________________________________________________    Interval History   Continues to feel better.  Denies any shortness of breath.    Physical Exam   Vital Signs: Temp: 98  F (36.7  C) Temp src: Oral BP: (!) 159/82 Pulse: 74   Resp: 20 SpO2: 90 % O2 Device: Nasal cannula Oxygen Delivery: 1 LPM  Weight: 119 lbs 11.2 oz    Vital signs reviewed  General:  Alert, calm, NAD  CV: regular rate and rhythm, no murmurs or rubs  Lungs:  Clear to ascultation bilaterally, normal respiratory effort  HEENT:  Pupil round, equal, conjuctivae, sclerae and lids normal, neck is supple  Abdomen:  Soft, nontender, nondistended, no masses, normal bowel sounds  Extremities:  No edema  Neuro: normal strength and sensation in all 4 extremities, cranial nerves grossly intact  Psychiatric:  Mood and affect within normal limits      Data   Hemoglobin 8.6 from 8.1 from 7.8 after transfusion of 1 unit from 6.8  Creatinine 3.0 from 3.1 from 3.2 from 3.4  Potassium 3.1  "

## 2022-04-26 NOTE — PLAN OF CARE
A/Ox4. Assist 1 w/ gait/walker. PIV saline locked. Up to toilet a few times last night. 2 L NC. Continue to wean. Plan to discharge back to assisted living w/ switch to oral abx. Care coordinator following.

## 2022-04-26 NOTE — PLAN OF CARE
Goal Outcome Evaluation:    VSS. A&Ox4. LS diminished. 95% NC 1L. Wean pt off O2 as tolerated. SBA refuses gait belt and walker when ambulating in room. Family is visiting pt and assist with cares.  PIV S/L. Reg diet. Discharge plan once weaned off O2. Will continue to monitor and follow POC

## 2022-04-26 NOTE — PROGRESS NOTES
Care Management Follow Up    Length of Stay (days): 4    Expected Discharge Date: 04/26/2022     Concerns to be Addressed:       Patient plan of care discussed at interdisciplinary rounds: Yes    Anticipated Discharge Disposition: Assisted Living     Anticipated Discharge Services: None  Anticipated Discharge DME: None    Patient/family educated on Medicare website which has current facility and service quality ratings: no  Education Provided on the Discharge Plan:  Yes  Patient/Family in Agreement with the Plan: yes    Referrals Placed by CM/SW: External Care Coordination  Private pay costs discussed: Not applicable    Additional Information:  CM received call from Abena DUMONT at [facility] requesting update on patient's discharge status. Per MD note from today, staff is attempting to wean patient's oxygen down. Abena requested discharge orders be faxed to [facility] at [phone]. Updated sticky note.     RN CC will continue to follow for discharge planning.    Kait Dobson RN, BSN, CPHN, CM  Inpatient Care Coordination - [facility]  [phone]

## 2022-04-27 VITALS
SYSTOLIC BLOOD PRESSURE: 112 MMHG | OXYGEN SATURATION: 98 % | TEMPERATURE: 98 F | RESPIRATION RATE: 18 BRPM | HEIGHT: 58 IN | BODY MASS INDEX: 24.35 KG/M2 | WEIGHT: 116 LBS | HEART RATE: 82 BPM | DIASTOLIC BLOOD PRESSURE: 83 MMHG

## 2022-04-27 LAB
ANION GAP SERPL CALCULATED.3IONS-SCNC: 8 MMOL/L (ref 3–14)
BUN SERPL-MCNC: 42 MG/DL (ref 7–30)
CALCIUM SERPL-MCNC: 7.9 MG/DL (ref 8.5–10.1)
CHLORIDE BLD-SCNC: 101 MMOL/L (ref 94–109)
CO2 SERPL-SCNC: 26 MMOL/L (ref 20–32)
CREAT SERPL-MCNC: 3 MG/DL (ref 0.52–1.04)
GFR SERPL CREATININE-BSD FRML MDRD: 14 ML/MIN/1.73M2
GLUCOSE BLD-MCNC: 108 MG/DL (ref 70–99)
POTASSIUM BLD-SCNC: 3.3 MMOL/L (ref 3.4–5.3)
SODIUM SERPL-SCNC: 135 MMOL/L (ref 133–144)

## 2022-04-27 PROCEDURE — 94640 AIRWAY INHALATION TREATMENT: CPT

## 2022-04-27 PROCEDURE — 250N000013 HC RX MED GY IP 250 OP 250 PS 637: Performed by: INTERNAL MEDICINE

## 2022-04-27 PROCEDURE — 250N000011 HC RX IP 250 OP 636: Performed by: INTERNAL MEDICINE

## 2022-04-27 PROCEDURE — 99239 HOSP IP/OBS DSCHRG MGMT >30: CPT | Performed by: INTERNAL MEDICINE

## 2022-04-27 PROCEDURE — 80048 BASIC METABOLIC PNL TOTAL CA: CPT | Performed by: INTERNAL MEDICINE

## 2022-04-27 PROCEDURE — 999N000157 HC STATISTIC RCP TIME EA 10 MIN

## 2022-04-27 PROCEDURE — C9113 INJ PANTOPRAZOLE SODIUM, VIA: HCPCS | Performed by: INTERNAL MEDICINE

## 2022-04-27 PROCEDURE — 36415 COLL VENOUS BLD VENIPUNCTURE: CPT | Performed by: INTERNAL MEDICINE

## 2022-04-27 RX ORDER — AMOXICILLIN AND CLAVULANATE POTASSIUM 250; 125 MG/1; MG/1
1 TABLET, FILM COATED ORAL 2 TIMES DAILY
Qty: 24 TABLET | Refills: 0 | Status: SHIPPED | OUTPATIENT
Start: 2022-04-27 | End: 2022-05-09

## 2022-04-27 RX ORDER — PANTOPRAZOLE SODIUM 40 MG/1
40 TABLET, DELAYED RELEASE ORAL 2 TIMES DAILY
Qty: 60 TABLET | Refills: 0 | Status: SHIPPED | OUTPATIENT
Start: 2022-04-27 | End: 2022-05-27

## 2022-04-27 RX ORDER — OLMESARTAN MEDOXOMIL 20 MG/1
20 TABLET ORAL DAILY
COMMUNITY
Start: 2022-04-27 | End: 2023-01-01

## 2022-04-27 RX ADMIN — ATORVASTATIN CALCIUM 40 MG: 40 TABLET, FILM COATED ORAL at 09:51

## 2022-04-27 RX ADMIN — PANTOPRAZOLE SODIUM 40 MG: 40 INJECTION, POWDER, FOR SOLUTION INTRAVENOUS at 09:50

## 2022-04-27 RX ADMIN — SERTRALINE HYDROCHLORIDE 150 MG: 50 TABLET ORAL at 09:51

## 2022-04-27 RX ADMIN — FLUTICASONE FUROATE 1 PUFF: 200 POWDER RESPIRATORY (INHALATION) at 08:29

## 2022-04-27 RX ADMIN — ALBUTEROL SULFATE 2 PUFF: 90 INHALANT RESPIRATORY (INHALATION) at 08:30

## 2022-04-27 ASSESSMENT — ACTIVITIES OF DAILY LIVING (ADL)
ADLS_ACUITY_SCORE: 7

## 2022-04-27 NOTE — PLAN OF CARE
Aox4. Denies pain. Pt reports SOB and ALVAREZ. VSS and currently on 1/2 L via NC sating mid 90s. Pt was on RA and ambulated to restroom. Sated down to 89% and back on 1/2 L via NC sating 90s again. PIV L SL. Labs: K 3.1; Hb 8.6; Creatinine 3.01. Regular diet. SBA w/ GB and walker. Plan: possible discharge pending pt's oxygenation needs. Continue w/ POC.

## 2022-04-27 NOTE — PROGRESS NOTES
Care Management Discharge Note    Discharge Date: 04/27/2022       Discharge Disposition: Assisted Living    Discharge Services: None    Discharge DME: None    Discharge Transportation: family or friend will provide    Patient/Family in Agreement with the Plan: yes    Handoff Referral Completed: No    Additional Information:  Patient with discharge orders.  Spoke with Abena nursing at [facility], to update on patient discharge today. Discharge orders faxed to [facility] at [phone].    Anticipate family to provide transportation upon discharge.         JS Marie RN Case Manager  Inpatient Care Coordination  [facility]   [phone]

## 2022-04-27 NOTE — PROGRESS NOTES
Temp: 98  F (36.7  C) Temp src: Oral BP: 112/83 Pulse: 82   Resp: 18 SpO2: 98 % O2 Device: None (Room air) Oxygen Delivery: 1/2 LPM     Pt. On room air all morning, sitting in chair. O2 stable at 96%. When sleeping O2 drops to 91%.   Family on bedside.   Denies pain  A&O x4     Discharge paper and medication explained and given.   Daughter in Law packed up all belongings, clothing, purse, phone...   Daughter in Law takes pt home in personal car

## 2022-04-27 NOTE — DISCHARGE SUMMARY
[facility]    Discharge Summary  Hospitalist    Date of Admission:  4/22/2022  Date of Discharge:  4/27/2022  Discharging Provider: Elina Kelsey MD    Discharge Diagnoses   UTI  Bacteremia    History of Present Illness   Please review admission history and physical.    Hospital Course   Inez Collier was admitted on 4/22/2022.  The following problems were addressed during her hospitalization:    Active Problems:    Pyelonephritis, acute    Acute renal failure superimposed on chronic kidney disease, unspecified CKD stage, unspecified acute renal failure type (H)  Inez Collier is a 89 year old female who presents with various symptoms including nausea and vomiting, feeling very tired and weak, symptoms of chills for almost 5 to 6 days.   She was diagnosed with a UTI secondary to E. coli with bacteremia.  This been complicated by generalized weakness, hypoxia and acute on chronic anemia.        #E coli bacteremia secondary to UTI:  Her symptoms of nausea and vomiting started about 5 to 6 prior to admission.  She also felt extremely weak and exhausted since the symptoms started.  She had chills but did not check her temperature.  On admission, pt afebrile. She did have leukocytosis.  UA indicative of infection.  She had CT A/P that showed no acute abnormality.    -Urine culture and blood cultures have come back positive for E coli susceptible to ceftriaxone.  -Repeat blood culture negative thus far.  -Ceftriaxone started 4/22.  Plan to transition to Augmentin on discharge, patient will need to complete 14 days of antibiotics, due to renal dosage adjustment her medications were changed to Augmentin 500 mg daily.     #Acute on chornic anemia: She does have CKD which is contributing but she does describe melena over recent past.  My partner had lengthy discussion with patient and patient's son who wanted to defer EGD at this point and treat conservatively.  She is OK with proceeding with EGD if  life-threatening bleeding but wants to avoid it otherwise.  -She was transfused 1 unit pRBC on 4/23 which may have been to dilution in setting of IV hydration.  Hgb on 4/24 appropriately risen.  Monitor daily.   -Continue empiric PPI BID.   - patient and son again on 4/24 affirmed that they want to defer GI w/u.    -They plan to f/u with outpatient renal team for discussion of possible EPO.  They have already had 3 outpatient IV iron infusion.   This was reiterated with her second son on the day of discharge, family is aware of the plan to follow-up outpatient and avoid any inpatient procedures this admission.     #Acute hypoxemic respiratory failure:  We were able to wean her oxygen down on 4/27 to room air.  Her CT showed some atelectatic cyst and some effusion, chest x-ray showed vascular congestion possibly due to IV fluids and blood transfusion.  She received few doses of Lasix, patient was weaned off of oxygen on day of discharge.  Encouraged I-S use.          #Generalized weakness:  Due to acute weakness, family is going to provide support for her on her discharge.     #CHRIS on CKD4: Baseline Cr around 2.8 based on care everywhere.  -- Patient is back on Coreg and amlodipine, blood pressures are borderline, discussed about holding olmesartan on discharge, her creatinine is close to baseline at 3 now.     #Hypokalemia:  Secondary to Lasix, replaced during her stay here.     #Hyponatremia: Improved with IVF.      #HTN: Continue coreg and amlodipine      Elina Kelsey MD    Significant Results and Procedures       Pending Results     Unresulted Labs Ordered in the Past 30 Days of this Admission     Date and Time Order Name Status Description    4/24/2022 12:01 AM Blood Culture Arm, Right Preliminary           Code Status   DNR / DNI       Primary Care Physician   [facility]    Physical Exam   Temp: 98  F (36.7  C) Temp src: Oral BP: 112/83 Pulse: 82   Resp: 18 SpO2: 98 % O2 Device: None (Room air)  Oxygen Delivery: 1/2 LPM  Vitals:    04/25/22 0449 04/26/22 0332 04/27/22 0533   Weight: 55.9 kg (123 lb 4.8 oz) 54.3 kg (119 lb 11.2 oz) 52.6 kg (116 lb)     Vital Signs with Ranges  Temp:  [98  F (36.7  C)-98.4  F (36.9  C)] 98  F (36.7  C)  Pulse:  [67-82] 82  Resp:  [16-20] 18  BP: (112-142)/(71-83) 112/83  SpO2:  [89 %-98 %] 98 %  I/O last 3 completed shifts:  In: 3 [I.V.:3]  Out: -     The patient was examined on the day of discharge.    Discharge Disposition   Discharged to home  Condition at discharge: Stable    Consultations This Hospital Stay   PHYSICAL THERAPY ADULT IP CONSULT  CARE MANAGEMENT / SOCIAL WORK IP CONSULT  CARE MANAGEMENT / SOCIAL WORK IP CONSULT    Time Spent on this Encounter   IElina MD, personally saw the patient today and spent greater than 30 minutes discharging this patient.    Discharge Orders      Reason for your hospital stay    UTI, bacteremia     Follow-up and recommended labs and tests     Follow up with primary care provider, [facility], within 7 days for hospital follow- up.  The following labs/tests are recommended: CBC to be obtained in 5 to 6 days..     Activity    Your activity upon discharge: activity as tolerated     Diet    Follow this diet upon discharge: Orders Placed This Encounter      Combination Diet Regular Diet Adult     Discharge Medications   Current Discharge Medication List      START taking these medications    Details   amoxicillin-clavulanate (AUGMENTIN) 250-125 MG tablet Take 1 tablet by mouth 2 times daily for 12 days  Qty: 24 tablet, Refills: 0    Associated Diagnoses: Urinary tract infection with hematuria, site unspecified      pantoprazole (PROTONIX) 40 MG EC tablet Take 1 tablet (40 mg) by mouth 2 times daily  Qty: 60 tablet, Refills: 0    Comments: After 1 month continue with once daily dose .  Associated Diagnoses: Acute on chronic anemia         CONTINUE these medications which have NOT CHANGED    Details   albuterol  (PROAIR HFA/PROVENTIL HFA/VENTOLIN HFA) 108 (90 Base) MCG/ACT inhaler Inhale 2 puffs into the lungs every 6 hours    Comments: Pharmacy may dispense brand covered by insurance (Proair, or proventil or ventolin or generic albuterol inhaler)      albuterol (PROVENTIL) (2.5 MG/3ML) 0.083% neb solution Take 1 vial (2.5 mg) by nebulization every 6 hours as needed for shortness of breath / dyspnea or wheezing  Qty: 20 vial, Refills: 1    Associated Diagnoses: COPD exacerbation (H)      amLODIPine (NORVASC) 10 MG tablet Take 10 mg by mouth every morning      aspirin (ASA) 81 MG tablet Take 81 mg by mouth daily      atorvastatin (LIPITOR) 40 MG tablet Take 40 mg by mouth daily      Calcium Citrate-Vitamin D (CALCIUM CITRATE + D3) 200-250 MG-UNIT TABS Take 2 tablets by mouth 2 times daily      carvedilol (COREG) 25 MG tablet Take 1 tablet (25 mg) by mouth 2 times daily (with meals)  Qty: 120 tablet, Refills: 1      dextromethorphan (TUSSIN COUGH) 15 MG/5ML syrup Take 10 mLs (30 mg) by mouth 4 times daily as needed for cough  Qty: 118 mL, Refills: 1    Associated Diagnoses: Pneumonia of left lower lobe due to infectious organism; COPD exacerbation (H)      famotidine (PEPCID) 20 MG tablet Take 20 mg by mouth 2 times daily      ferrous sulfate (FEROSUL) 325 (65 Fe) MG tablet Take 325 mg by mouth daily (with breakfast)      fluticasone (ARNUITY ELLIPTA) 200 MCG/ACT inhaler Inhale 1 puff into the lungs daily      gabapentin (NEURONTIN) 100 MG capsule Take 100 mg by mouth 2 times daily      glucosamine-chondroitin 500-400 MG CAPS Take 3 capsules by mouth every morning      olmesartan (BENICAR) 20 MG tablet Take 20 mg by mouth daily      ondansetron (ZOFRAN-ODT) 4 MG ODT tab Take 1 tablet (4 mg) by mouth every 8 hours as needed for nausea  Qty: 8 tablet, Refills: 1    Associated Diagnoses: Nausea      sertraline (ZOLOFT) 100 MG tablet Take 150 mg by mouth every morning         STOP taking these medications       meclizine  (ANTIVERT) 12.5 MG tablet Comments:   Reason for Stopping:             Allergies   Allergies   Allergen Reactions     Lisinopril      Morphine Hcl      Data   Most Recent 3 CBC's:Recent Labs   Lab Test 04/26/22  0557 04/25/22  0543 04/24/22  0605 04/23/22  0603   WBC  --  8.5 10.5 12.6*   HGB 8.6* 8.1* 7.8* 6.8*   MCV  --  87 90 89   PLT  --  134* 131* 139*      Most Recent 3 BMP's:  Recent Labs   Lab Test 04/27/22  0621 04/26/22  0557 04/25/22  1303 04/25/22  0543    136  --  135   POTASSIUM 3.3* 3.1* 3.5 3.0*   CHLORIDE 101 103  --  105   CO2 26 24  --  20   BUN 42* 44*  --  47*   CR 3.00* 3.01*  --  3.16*   ANIONGAP 8 9  --  10   RIGO 7.9* 7.6*  --  7.8*   * 101*  --  100*     Most Recent 2 LFT's:  Recent Labs   Lab Test 04/22/22  1625 11/10/19  0747   AST 43 39   ALT 15 14   ALKPHOS 64 45   BILITOTAL 0.5 0.3     Most Recent INR's and Anticoagulation Dosing History:  Anticoagulation Dose History     Recent Dosing and Labs Latest Ref Rng & Units 4/22/2013 4/22/2022    INR 0.85 - 1.15 0.93 1.16(H)        Most Recent 3 Troponin's:  Recent Labs   Lab Test 09/14/16  2250 08/04/14  1107   TROPI <0.015  The 99th percentile for upper reference range is 0.045 ug/L.  Troponin values in   the range of 0.045 - 0.120 ug/L may be associated with risks of adverse   clinical events.   <0.015  The 99th percentile for upper reference range is 0.045 ug/L.  Troponin values in   the range of 0.045 - 0.120 ug/L may be associated with risks of adverse   clinical events.   Effective 7/30/2014, the reference range for this assay has changed to reflect   new instrumentation/methodology.       Most Recent Cholesterol Panel:No lab results found.  Most Recent 6 Bacteria Isolates From Any Culture (See EPIC Reports for Culture Details):  Recent Labs   Lab Test 11/09/19  1339 11/09/19  1330 02/03/16  1624   CULT No growth No growth <10,000 colonies/mL urogenital george Susceptibility testing not routinely done     Most Recent TSH, T4  and A1c Labs:No lab results found.  Results for orders placed or performed during the hospital encounter of 04/22/22   US Abdomen Limited (RUQ)    Narrative    EXAM: US ABDOMEN LIMITED  LOCATION: [facility]  DATE/TIME: 4/22/2022 5:38 PM    INDICATION: abd pain  COMPARISON: None.  TECHNIQUE: Limited abdominal ultrasound.    FINDINGS:    GALLBLADDER: Normal. No gallstones, wall thickening, or pericholecystic fluid. Negative sonographic Amado's sign.    BILE DUCTS: No biliary dilatation. The common duct measures 5 mm.    LIVER: Normal parenchyma with smooth contour. No focal mass.    RIGHT KIDNEY: No hydronephrosis. Renal parenchyma appears echogenic with several small scattered cysts the largest measuring 1.1 x 0.9 x 0.8 cm.    PANCREAS: The visualized portions are normal.    No ascites.      Impression    IMPRESSION:  1.  No evidence for gallstones or cholecystitis.  2.  Echogenic right renal parenchyma can be seen with chronic renal disease. Several small cysts. No hydronephrosis.       CT Abdomen Pelvis w/o Contrast    Narrative    EXAM: CT ABDOMEN PELVIS W/O CONTRAST  LOCATION: [facility]  DATE/TIME: 4/22/2022 7:38 PM    INDICATION: Nausea/vomiting, abdominal pain  COMPARISON: Abdominal ultrasound 04/22/2022  TECHNIQUE: CT scan of the abdomen and pelvis was performed without IV contrast. Multiplanar reformats were obtained. Dose reduction techniques were used.  CONTRAST: None.    FINDINGS:   LOWER CHEST: Small bilateral pleural effusions and bibasilar atelectasis. Small esophageal hiatal hernia.    HEPATOBILIARY: Normal.    PANCREAS: Normal.    SPLEEN: Normal.    ADRENAL GLANDS: Normal.    KIDNEYS/BLADDER: No renal calculi or hydronephrosis. Small left renal cyst.    BOWEL: A few diverticula sigmoid colon, without evidence for diverticulitis or bowel obstruction.    LYMPH NODES: Normal.    VASCULATURE: Advanced atherosclerotic disease abdominal aorta and iliac  arteries. 3.2 x 2.7 cm infrarenal abdominal aortic aneurysm. Incidental retroaortic left renal vein.    PELVIC ORGANS: Hysterectomy.    MUSCULOSKELETAL: Advanced degenerative disc disease lumbar spine. Osteopenia.      Impression    IMPRESSION:   1.  Small bilateral pleural effusions and bibasilar atelectasis.  2.  Advanced atherosclerotic disease with 3.2 x 2.7 cm infrarenal abdominal aortic aneurysm.  3.  Sigmoid diverticulosis.  4.  No evidence for bowel obstruction or other findings to account for the patient's symptoms.     XR Chest Port 1 View    Narrative    EXAM: XR CHEST PORTABLE 1 VIEW  LOCATION: [facility]  DATE/TIME: 04/24/2022, 10:47 AM    INDICATION: Cough, oxygen requirement.  COMPARISON: 11/09/2019.      Impression    IMPRESSION: Increased vascular congestion compared to previous. Some scattered new interstitial changes are evident. No consolidation.

## 2022-04-27 NOTE — PLAN OF CARE
"/82   Pulse 77   Temp 98.4  F (36.9  C) (Oral)   Resp 20   Ht 1.473 m (4' 10\")   Wt 54.3 kg (119 lb 11.2 oz)   SpO2 95%   BMI 25.02 kg/m      VSS. A&Ox4. LS diminished. 90% NC 0.5L. Wean pt off O2 as tolerated. SBA refuses gait belt and walker when ambulating in room. PIV S/L. Reg diet. Discharge plan once weaned off O2. Will continue to monitor and follow POC   "

## 2022-04-28 ENCOUNTER — PATIENT OUTREACH (OUTPATIENT)
Dept: CARE COORDINATION | Facility: CLINIC | Age: 87
End: 2022-04-28
Payer: COMMERCIAL

## 2022-04-28 DIAGNOSIS — Z71.89 OTHER SPECIFIED COUNSELING: ICD-10-CM

## 2022-04-28 NOTE — PROGRESS NOTES
Clinic Care Coordination Contact  [facility]/Voicemail       Clinical Data: Care Coordinator Outreach  Outreach attempted x 1.  Left message on patient's voicemail with call back information and requested return call.  Plan:Care Coordinator will try to reach patient again in 1-2 business days.    Alexandrea Hester  Care Transitions Assistant  [facility]

## 2022-04-29 LAB — BACTERIA BLD CULT: NO GROWTH

## 2022-04-29 NOTE — PROGRESS NOTES
Clinic Care Coordination Contact  [facility]: Post-Discharge Note  SITUATION                                                      Admission:    Admission Date: 04/22/22   Reason for Admission: UTI  Bacteremia  Discharge:   Discharge Date: 04/27/22  Discharge Diagnosis: UTI  Bacteremia    BACKGROUND                                                      Per hospital discharge summary and inpatient provider notes:  Active Problems:    Pyelonephritis, acute    Acute renal failure superimposed on chronic kidney disease, unspecified CKD stage, unspecified acute renal failure type (H)  Inez Collier is a 89 year old female who presents with various symptoms including nausea and vomiting, feeling very tired and weak, symptoms of chills for almost 5 to 6 days.   She was diagnosed with a UTI secondary to E. coli with bacteremia.  This been complicated by generalized weakness, hypoxia and acute on chronic anemia.        #E coli bacteremia secondary to UTI:  Her symptoms of nausea and vomiting started about 5 to 6 prior to admission.  She also felt extremely weak and exhausted since the symptoms started.  She had chills but did not check her temperature.  On admission, pt afebrile. She did have leukocytosis.  UA indicative of infection.  She had CT A/P that showed no acute abnormality.    -Urine culture and blood cultures have come back positive for E coli susceptible to ceftriaxone.  -Repeat blood culture negative thus far.  -Ceftriaxone started 4/22.  Plan to transition to Augmentin on discharge, patient will need to complete 14 days of antibiotics, due to renal dosage adjustment her medications were changed to Augmentin 500 mg daily.     #Acute on chornic anemia: She does have CKD which is contributing but she does describe melena over recent past.  My partner had lengthy discussion with patient and patient's son who wanted to defer EGD at this point and treat conservatively.  She is OK with proceeding with EGD if  life-threatening bleeding but wants to avoid it otherwise.  -She was transfused 1 unit pRBC on 4/23 which may have been to dilution in setting of IV hydration.  Hgb on 4/24 appropriately risen.  Monitor daily.   -Continue empiric PPI BID.   - patient and son again on 4/24 affirmed that they want to defer GI w/u.    -They plan to f/u with outpatient renal team for discussion of possible EPO.  They have already had 3 outpatient IV iron infusion.   This was reiterated with her second son on the day of discharge, family is aware of the plan to follow-up outpatient and avoid any inpatient procedures this admission.     #Acute hypoxemic respiratory failure:  We were able to wean her oxygen down on 4/27 to room air.  Her CT showed some atelectatic cyst and some effusion, chest x-ray showed vascular congestion possibly due to IV fluids and blood transfusion.  She received few doses of Lasix, patient was weaned off of oxygen on day of discharge.  Encouraged I-S use.          #Generalized weakness:  Due to acute weakness, family is going to provide support for her on her discharge.     #CHRIS on CKD4: Baseline Cr around 2.8 based on care everywhere.  -- Patient is back on Coreg and amlodipine, blood pressures are borderline, discussed about holding olmesartan on discharge, her creatinine is close to baseline at 3 now.     #Hypokalemia:  Secondary to Lasix, replaced during her stay here.     #Hyponatremia: Improved with IVF.      #HTN: Continue coreg and amlodipine    ASSESSMENT      Enrollment  Primary Care Care Coordination Status: Not a Candidate    Discharge Assessment  How are you doing now that you are home?: doing really well, going for short walks  How are your symptoms? (Red Flag symptoms escalate to triage hotline per guidelines): Improved  Do you feel your condition is stable enough to be safe at home until your provider visit?: Yes  Does the patient have their discharge instructions? : Yes  Does the patient have  questions regarding their discharge instructions? : No  Were you started on any new medications or were there changes to any of your previous medications? : Yes  Does the patient have all of their medications?: Yes  Do you have questions regarding any of your medications? : No  Do you have all of your needed medical supplies or equipment (DME)?  (i.e. oxygen tank, CPAP, cane, etc.): Yes  Discharge follow-up appointment scheduled within 14 calendar days? : Yes  Discharge Follow Up Appointment Date: 05/02/22  Discharge Follow Up Appointment Scheduled with?: Primary Care Provider        PLAN                                                      Outpatient Plan:    Follow up with primary care provider, [facility], within 7 days for hospital follow- up.  The following labs/tests are recommended: CBC to be obtained in 5 to 6 days..          No future appointments.      For any urgent concerns, please contact our 24 hour nurse triage line: [phone] ([phone])         Albertina Hester MA

## 2022-04-30 ENCOUNTER — HEALTH MAINTENANCE LETTER (OUTPATIENT)
Age: 87
End: 2022-04-30

## 2022-10-12 NOTE — PROVIDER NOTIFICATION
DATE:  4/23/2022   TIME OF RECEIPT FROM LAB:  1125  LAB TEST:  Blood cultures  LAB VALUE:  Gram neg. Bacilli   RESULTS GIVEN WITH READ-BACK TO (PROVIDER):  Jhonny PÉREZ LAB VALUE REPORTED TO PROVIDER:   1139     [Negative] : Heme/Lymph

## 2023-01-01 ENCOUNTER — APPOINTMENT (OUTPATIENT)
Dept: GENERAL RADIOLOGY | Facility: CLINIC | Age: 88
DRG: 193 | End: 2023-01-01
Attending: INTERNAL MEDICINE
Payer: COMMERCIAL

## 2023-01-01 ENCOUNTER — HOSPITAL ENCOUNTER (OUTPATIENT)
Age: 88
End: 2023-01-01
Attending: INTERNAL MEDICINE | Admitting: INTERNAL MEDICINE
Payer: COMMERCIAL

## 2023-01-01 ENCOUNTER — APPOINTMENT (OUTPATIENT)
Dept: CT IMAGING | Facility: CLINIC | Age: 88
End: 2023-01-01
Attending: EMERGENCY MEDICINE
Payer: COMMERCIAL

## 2023-01-01 ENCOUNTER — DOCUMENTATION ONLY (OUTPATIENT)
Dept: OTHER | Facility: CLINIC | Age: 88
End: 2023-01-01
Payer: COMMERCIAL

## 2023-01-01 ENCOUNTER — APPOINTMENT (OUTPATIENT)
Dept: PHYSICAL THERAPY | Facility: CLINIC | Age: 88
DRG: 193 | End: 2023-01-01
Attending: INTERNAL MEDICINE
Payer: COMMERCIAL

## 2023-01-01 ENCOUNTER — HOSPITAL ENCOUNTER (EMERGENCY)
Facility: CLINIC | Age: 88
Discharge: HOME | End: 2023-09-07
Attending: EMERGENCY MEDICINE | Admitting: EMERGENCY MEDICINE
Payer: COMMERCIAL

## 2023-01-01 ENCOUNTER — HEALTH MAINTENANCE LETTER (OUTPATIENT)
Age: 88
End: 2023-01-01

## 2023-01-01 ENCOUNTER — APPOINTMENT (OUTPATIENT)
Dept: CARDIOLOGY | Facility: CLINIC | Age: 88
DRG: 193 | End: 2023-01-01
Attending: INTERNAL MEDICINE
Payer: COMMERCIAL

## 2023-01-01 ENCOUNTER — APPOINTMENT (OUTPATIENT)
Dept: CT IMAGING | Facility: CLINIC | Age: 88
DRG: 193 | End: 2023-01-01
Attending: EMERGENCY MEDICINE
Payer: COMMERCIAL

## 2023-01-01 ENCOUNTER — HOSPITAL ENCOUNTER (INPATIENT)
Facility: CLINIC | Age: 88
LOS: 5 days | Discharge: HOSPICE | DRG: 193 | End: 2023-09-19
Attending: EMERGENCY MEDICINE | Admitting: INTERNAL MEDICINE
Payer: COMMERCIAL

## 2023-01-01 VITALS
RESPIRATION RATE: 20 BRPM | DIASTOLIC BLOOD PRESSURE: 63 MMHG | BODY MASS INDEX: 21.33 KG/M2 | SYSTOLIC BLOOD PRESSURE: 132 MMHG | HEIGHT: 59 IN | OXYGEN SATURATION: 93 % | WEIGHT: 105.82 LBS | TEMPERATURE: 97.6 F | HEART RATE: 77 BPM

## 2023-01-01 VITALS
TEMPERATURE: 97.7 F | WEIGHT: 115.08 LBS | SYSTOLIC BLOOD PRESSURE: 188 MMHG | OXYGEN SATURATION: 94 % | DIASTOLIC BLOOD PRESSURE: 96 MMHG | BODY MASS INDEX: 24.05 KG/M2 | HEART RATE: 68 BPM | RESPIRATION RATE: 14 BRPM

## 2023-01-01 DIAGNOSIS — K62.5 BRBPR (BRIGHT RED BLOOD PER RECTUM): ICD-10-CM

## 2023-01-01 DIAGNOSIS — E87.6 HYPOKALEMIA: ICD-10-CM

## 2023-01-01 DIAGNOSIS — J96.01 ACUTE HYPOXEMIC RESPIRATORY FAILURE (H): ICD-10-CM

## 2023-01-01 DIAGNOSIS — D64.9 CHRONIC ANEMIA: ICD-10-CM

## 2023-01-01 DIAGNOSIS — R19.7 VOMITING AND DIARRHEA: ICD-10-CM

## 2023-01-01 DIAGNOSIS — J18.9 PNEUMONIA OF LEFT LOWER LOBE DUE TO INFECTIOUS ORGANISM: ICD-10-CM

## 2023-01-01 DIAGNOSIS — N18.6 END STAGE RENAL DISEASE (H): ICD-10-CM

## 2023-01-01 DIAGNOSIS — R11.0 NAUSEA: ICD-10-CM

## 2023-01-01 DIAGNOSIS — J44.1 COPD EXACERBATION (H): ICD-10-CM

## 2023-01-01 DIAGNOSIS — R11.10 VOMITING AND DIARRHEA: ICD-10-CM

## 2023-01-01 DIAGNOSIS — N17.9 ACUTE KIDNEY INJURY SUPERIMPOSED ON CKD (H): ICD-10-CM

## 2023-01-01 DIAGNOSIS — R52 PAIN: ICD-10-CM

## 2023-01-01 DIAGNOSIS — K21.00 GASTROESOPHAGEAL REFLUX DISEASE WITH ESOPHAGITIS WITHOUT HEMORRHAGE: ICD-10-CM

## 2023-01-01 DIAGNOSIS — N18.9 ACUTE KIDNEY INJURY SUPERIMPOSED ON CKD (H): ICD-10-CM

## 2023-01-01 DIAGNOSIS — J69.0 ASPIRATION PNEUMONITIS (H): ICD-10-CM

## 2023-01-01 DIAGNOSIS — I10 ESSENTIAL HYPERTENSION: Primary | ICD-10-CM

## 2023-01-01 LAB
ABO/RH(D): NORMAL
ALBUMIN SERPL BCG-MCNC: 4.2 G/DL (ref 3.5–5.2)
ALBUMIN SERPL BCG-MCNC: 4.5 G/DL (ref 3.5–5.2)
ALBUMIN UR-MCNC: 100 MG/DL
ALP SERPL-CCNC: 65 U/L (ref 35–104)
ALP SERPL-CCNC: 67 U/L (ref 35–104)
ALT SERPL W P-5'-P-CCNC: 12 U/L (ref 0–50)
ALT SERPL W P-5'-P-CCNC: 12 U/L (ref 0–50)
ANION GAP SERPL CALCULATED.3IONS-SCNC: 11 MMOL/L (ref 7–15)
ANION GAP SERPL CALCULATED.3IONS-SCNC: 12 MMOL/L (ref 7–15)
ANION GAP SERPL CALCULATED.3IONS-SCNC: 15 MMOL/L (ref 7–15)
ANION GAP SERPL CALCULATED.3IONS-SCNC: 15 MMOL/L (ref 7–15)
ANION GAP SERPL CALCULATED.3IONS-SCNC: 17 MMOL/L (ref 7–15)
ANTIBODY SCREEN: NEGATIVE
APPEARANCE UR: CLEAR
AST SERPL W P-5'-P-CCNC: 31 U/L (ref 0–45)
AST SERPL W P-5'-P-CCNC: 32 U/L (ref 0–45)
ATRIAL RATE - MUSE: 67 BPM
B-OH-BUTYR SERPL-SCNC: 0.3 MMOL/L
BACTERIA #/AREA URNS HPF: ABNORMAL /HPF
BACTERIA BLD CULT: NO GROWTH
BACTERIA BLD CULT: NO GROWTH
BASOPHILS # BLD AUTO: 0 10E3/UL (ref 0–0.2)
BASOPHILS # BLD AUTO: 0.1 10E3/UL (ref 0–0.2)
BASOPHILS NFR BLD AUTO: 0 %
BASOPHILS NFR BLD AUTO: 1 %
BILIRUB DIRECT SERPL-MCNC: <0.2 MG/DL (ref 0–0.3)
BILIRUB SERPL-MCNC: 0.2 MG/DL
BILIRUB SERPL-MCNC: 0.3 MG/DL
BILIRUB UR QL STRIP: NEGATIVE
BUN SERPL-MCNC: 58.3 MG/DL (ref 8–23)
BUN SERPL-MCNC: 59.8 MG/DL (ref 8–23)
BUN SERPL-MCNC: 63.8 MG/DL (ref 8–23)
BUN SERPL-MCNC: 64.4 MG/DL (ref 8–23)
BUN SERPL-MCNC: 68.5 MG/DL (ref 8–23)
CALCIUM SERPL-MCNC: 7.9 MG/DL (ref 8.2–9.6)
CALCIUM SERPL-MCNC: 8 MG/DL (ref 8.2–9.6)
CALCIUM SERPL-MCNC: 9.1 MG/DL (ref 8.2–9.6)
CALCIUM SERPL-MCNC: 9.5 MG/DL (ref 8.2–9.6)
CALCIUM SERPL-MCNC: 9.6 MG/DL (ref 8.2–9.6)
CHLORIDE SERPL-SCNC: 102 MMOL/L (ref 98–107)
CHLORIDE SERPL-SCNC: 91 MMOL/L (ref 98–107)
CHLORIDE SERPL-SCNC: 93 MMOL/L (ref 98–107)
CHLORIDE SERPL-SCNC: 97 MMOL/L (ref 98–107)
CHLORIDE SERPL-SCNC: 98 MMOL/L (ref 98–107)
COLOR UR AUTO: ABNORMAL
CREAT SERPL-MCNC: 3.88 MG/DL (ref 0.51–0.95)
CREAT SERPL-MCNC: 4.38 MG/DL (ref 0.51–0.95)
CREAT SERPL-MCNC: 4.53 MG/DL (ref 0.51–0.95)
CREAT SERPL-MCNC: 4.69 MG/DL (ref 0.51–0.95)
CREAT SERPL-MCNC: 4.75 MG/DL (ref 0.51–0.95)
DEPRECATED HCO3 PLAS-SCNC: 24 MMOL/L (ref 22–29)
DEPRECATED HCO3 PLAS-SCNC: 24 MMOL/L (ref 22–29)
DEPRECATED HCO3 PLAS-SCNC: 26 MMOL/L (ref 22–29)
DEPRECATED HCO3 PLAS-SCNC: 26 MMOL/L (ref 22–29)
DEPRECATED HCO3 PLAS-SCNC: 29 MMOL/L (ref 22–29)
DIASTOLIC BLOOD PRESSURE - MUSE: NORMAL MMHG
EGFRCR SERPLBLD CKD-EPI 2021: 10 ML/MIN/1.73M2
EGFRCR SERPLBLD CKD-EPI 2021: 8 ML/MIN/1.73M2
EGFRCR SERPLBLD CKD-EPI 2021: 8 ML/MIN/1.73M2
EGFRCR SERPLBLD CKD-EPI 2021: 9 ML/MIN/1.73M2
EGFRCR SERPLBLD CKD-EPI 2021: 9 ML/MIN/1.73M2
EOSINOPHIL # BLD AUTO: 0 10E3/UL (ref 0–0.7)
EOSINOPHIL # BLD AUTO: 0.4 10E3/UL (ref 0–0.7)
EOSINOPHIL NFR BLD AUTO: 0 %
EOSINOPHIL NFR BLD AUTO: 6 %
ERYTHROCYTE [DISTWIDTH] IN BLOOD BY AUTOMATED COUNT: 12.6 % (ref 10–15)
ERYTHROCYTE [DISTWIDTH] IN BLOOD BY AUTOMATED COUNT: 12.9 % (ref 10–15)
ERYTHROCYTE [DISTWIDTH] IN BLOOD BY AUTOMATED COUNT: 13 % (ref 10–15)
ERYTHROCYTE [DISTWIDTH] IN BLOOD BY AUTOMATED COUNT: 13.1 % (ref 10–15)
FLUAV RNA SPEC QL NAA+PROBE: NEGATIVE
FLUBV RNA RESP QL NAA+PROBE: NEGATIVE
GLUCOSE SERPL-MCNC: 107 MG/DL (ref 70–99)
GLUCOSE SERPL-MCNC: 111 MG/DL (ref 70–99)
GLUCOSE SERPL-MCNC: 118 MG/DL (ref 70–99)
GLUCOSE SERPL-MCNC: 125 MG/DL (ref 70–99)
GLUCOSE SERPL-MCNC: 130 MG/DL (ref 70–99)
GLUCOSE UR STRIP-MCNC: NEGATIVE MG/DL
HCT VFR BLD AUTO: 21.4 % (ref 35–47)
HCT VFR BLD AUTO: 25.5 % (ref 35–47)
HCT VFR BLD AUTO: 25.9 % (ref 35–47)
HCT VFR BLD AUTO: 27.7 % (ref 35–47)
HGB BLD-MCNC: 7.3 G/DL (ref 11.7–15.7)
HGB BLD-MCNC: 8.5 G/DL (ref 11.7–15.7)
HGB BLD-MCNC: 8.8 G/DL (ref 11.7–15.7)
HGB BLD-MCNC: 9.4 G/DL (ref 11.7–15.7)
HGB UR QL STRIP: ABNORMAL
HOLD SPECIMEN: NORMAL
IMM GRANULOCYTES # BLD: 0 10E3/UL
IMM GRANULOCYTES # BLD: 0.1 10E3/UL
IMM GRANULOCYTES NFR BLD: 0 %
IMM GRANULOCYTES NFR BLD: 1 %
INTERPRETATION ECG - MUSE: NORMAL
KETONES UR STRIP-MCNC: NEGATIVE MG/DL
LACTATE SERPL-SCNC: 0.7 MMOL/L (ref 0.7–2)
LEUKOCYTE ESTERASE UR QL STRIP: NEGATIVE
LVEF ECHO: NORMAL
LYMPHOCYTES # BLD AUTO: 0.7 10E3/UL (ref 0.8–5.3)
LYMPHOCYTES # BLD AUTO: 1.1 10E3/UL (ref 0.8–5.3)
LYMPHOCYTES NFR BLD AUTO: 16 %
LYMPHOCYTES NFR BLD AUTO: 3 %
MCH RBC QN AUTO: 28.8 PG (ref 26.5–33)
MCH RBC QN AUTO: 29.1 PG (ref 26.5–33)
MCH RBC QN AUTO: 29.3 PG (ref 26.5–33)
MCH RBC QN AUTO: 29.6 PG (ref 26.5–33)
MCHC RBC AUTO-ENTMCNC: 33.3 G/DL (ref 31.5–36.5)
MCHC RBC AUTO-ENTMCNC: 33.9 G/DL (ref 31.5–36.5)
MCHC RBC AUTO-ENTMCNC: 34 G/DL (ref 31.5–36.5)
MCHC RBC AUTO-ENTMCNC: 34.1 G/DL (ref 31.5–36.5)
MCV RBC AUTO: 86 FL (ref 78–100)
MCV RBC AUTO: 87 FL (ref 78–100)
MONOCYTES # BLD AUTO: 0.6 10E3/UL (ref 0–1.3)
MONOCYTES # BLD AUTO: 0.9 10E3/UL (ref 0–1.3)
MONOCYTES NFR BLD AUTO: 4 %
MONOCYTES NFR BLD AUTO: 9 %
MUCOUS THREADS #/AREA URNS LPF: PRESENT /LPF
NEUTROPHILS # BLD AUTO: 21.9 10E3/UL (ref 1.6–8.3)
NEUTROPHILS # BLD AUTO: 4.8 10E3/UL (ref 1.6–8.3)
NEUTROPHILS NFR BLD AUTO: 68 %
NEUTROPHILS NFR BLD AUTO: 92 %
NITRATE UR QL: NEGATIVE
NRBC # BLD AUTO: 0 10E3/UL
NRBC # BLD AUTO: 0 10E3/UL
NRBC BLD AUTO-RTO: 0 /100
NRBC BLD AUTO-RTO: 0 /100
NT-PROBNP SERPL-MCNC: 2449 PG/ML (ref 0–1800)
P AXIS - MUSE: 23 DEGREES
PH UR STRIP: 5.5 [PH] (ref 5–7)
PLATELET # BLD AUTO: 175 10E3/UL (ref 150–450)
PLATELET # BLD AUTO: 208 10E3/UL (ref 150–450)
PLATELET # BLD AUTO: 223 10E3/UL (ref 150–450)
PLATELET # BLD AUTO: 245 10E3/UL (ref 150–450)
POTASSIUM SERPL-SCNC: 2.7 MMOL/L (ref 3.4–5.3)
POTASSIUM SERPL-SCNC: 2.7 MMOL/L (ref 3.4–5.3)
POTASSIUM SERPL-SCNC: 2.9 MMOL/L (ref 3.4–5.3)
POTASSIUM SERPL-SCNC: 2.9 MMOL/L (ref 3.4–5.3)
POTASSIUM SERPL-SCNC: 3.8 MMOL/L (ref 3.4–5.3)
PR INTERVAL - MUSE: 180 MS
PROCALCITONIN SERPL IA-MCNC: 3.23 NG/ML
PROT SERPL-MCNC: 7.1 G/DL (ref 6.4–8.3)
PROT SERPL-MCNC: 7.4 G/DL (ref 6.4–8.3)
QRS DURATION - MUSE: 142 MS
QT - MUSE: 482 MS
QTC - MUSE: 509 MS
R AXIS - MUSE: -6 DEGREES
RBC # BLD AUTO: 2.47 10E6/UL (ref 3.8–5.2)
RBC # BLD AUTO: 2.95 10E6/UL (ref 3.8–5.2)
RBC # BLD AUTO: 3 10E6/UL (ref 3.8–5.2)
RBC # BLD AUTO: 3.23 10E6/UL (ref 3.8–5.2)
RBC URINE: 2 /HPF
RSV RNA SPEC NAA+PROBE: NEGATIVE
SARS-COV-2 RNA RESP QL NAA+PROBE: NEGATIVE
SODIUM SERPL-SCNC: 135 MMOL/L (ref 136–145)
SODIUM SERPL-SCNC: 135 MMOL/L (ref 136–145)
SODIUM SERPL-SCNC: 136 MMOL/L (ref 136–145)
SODIUM SERPL-SCNC: 136 MMOL/L (ref 136–145)
SODIUM SERPL-SCNC: 138 MMOL/L (ref 136–145)
SP GR UR STRIP: 1.01 (ref 1–1.03)
SPECIMEN EXPIRATION DATE: NORMAL
SYSTOLIC BLOOD PRESSURE - MUSE: NORMAL MMHG
T AXIS - MUSE: -17 DEGREES
TROPONIN T SERPL HS-MCNC: 71 NG/L
TROPONIN T SERPL HS-MCNC: 74 NG/L
UROBILINOGEN UR STRIP-MCNC: NORMAL MG/DL
VENTRICULAR RATE- MUSE: 67 BPM
WBC # BLD AUTO: 12.9 10E3/UL (ref 4–11)
WBC # BLD AUTO: 14.6 10E3/UL (ref 4–11)
WBC # BLD AUTO: 23.6 10E3/UL (ref 4–11)
WBC # BLD AUTO: 7.1 10E3/UL (ref 4–11)
WBC URINE: 1 /HPF

## 2023-01-01 PROCEDURE — 36415 COLL VENOUS BLD VENIPUNCTURE: CPT | Performed by: EMERGENCY MEDICINE

## 2023-01-01 PROCEDURE — 120N000001 HC R&B MED SURG/OB

## 2023-01-01 PROCEDURE — 83605 ASSAY OF LACTIC ACID: CPT | Performed by: EMERGENCY MEDICINE

## 2023-01-01 PROCEDURE — 94640 AIRWAY INHALATION TREATMENT: CPT | Mod: 76

## 2023-01-01 PROCEDURE — 87637 SARSCOV2&INF A&B&RSV AMP PRB: CPT | Performed by: EMERGENCY MEDICINE

## 2023-01-01 PROCEDURE — 82248 BILIRUBIN DIRECT: CPT | Performed by: EMERGENCY MEDICINE

## 2023-01-01 PROCEDURE — 97530 THERAPEUTIC ACTIVITIES: CPT | Mod: GP | Performed by: PHYSICAL THERAPIST

## 2023-01-01 PROCEDURE — 999N000157 HC STATISTIC RCP TIME EA 10 MIN

## 2023-01-01 PROCEDURE — 99497 ADVNCD CARE PLAN 30 MIN: CPT | Mod: 25 | Performed by: INTERNAL MEDICINE

## 2023-01-01 PROCEDURE — 83880 ASSAY OF NATRIURETIC PEPTIDE: CPT | Performed by: INTERNAL MEDICINE

## 2023-01-01 PROCEDURE — 93306 TTE W/DOPPLER COMPLETE: CPT

## 2023-01-01 PROCEDURE — 99207 PR NO BILLABLE SERVICE THIS VISIT: CPT | Performed by: INTERNAL MEDICINE

## 2023-01-01 PROCEDURE — 86850 RBC ANTIBODY SCREEN: CPT | Performed by: EMERGENCY MEDICINE

## 2023-01-01 PROCEDURE — 99232 SBSQ HOSP IP/OBS MODERATE 35: CPT | Performed by: INTERNAL MEDICINE

## 2023-01-01 PROCEDURE — 80053 COMPREHEN METABOLIC PANEL: CPT | Performed by: EMERGENCY MEDICINE

## 2023-01-01 PROCEDURE — 250N000011 HC RX IP 250 OP 636: Mod: JZ | Performed by: INTERNAL MEDICINE

## 2023-01-01 PROCEDURE — 85025 COMPLETE CBC W/AUTO DIFF WBC: CPT | Performed by: EMERGENCY MEDICINE

## 2023-01-01 PROCEDURE — 250N000013 HC RX MED GY IP 250 OP 250 PS 637: Performed by: INTERNAL MEDICINE

## 2023-01-01 PROCEDURE — 82310 ASSAY OF CALCIUM: CPT | Performed by: EMERGENCY MEDICINE

## 2023-01-01 PROCEDURE — 36415 COLL VENOUS BLD VENIPUNCTURE: CPT | Performed by: INTERNAL MEDICINE

## 2023-01-01 PROCEDURE — 71045 X-RAY EXAM CHEST 1 VIEW: CPT

## 2023-01-01 PROCEDURE — 84484 ASSAY OF TROPONIN QUANT: CPT | Performed by: INTERNAL MEDICINE

## 2023-01-01 PROCEDURE — 96367 TX/PROPH/DG ADDL SEQ IV INF: CPT

## 2023-01-01 PROCEDURE — 93306 TTE W/DOPPLER COMPLETE: CPT | Mod: 26 | Performed by: INTERNAL MEDICINE

## 2023-01-01 PROCEDURE — 99284 EMERGENCY DEPT VISIT MOD MDM: CPT | Mod: 25

## 2023-01-01 PROCEDURE — 80048 BASIC METABOLIC PNL TOTAL CA: CPT | Performed by: INTERNAL MEDICINE

## 2023-01-01 PROCEDURE — 82310 ASSAY OF CALCIUM: CPT | Performed by: INTERNAL MEDICINE

## 2023-01-01 PROCEDURE — 82010 KETONE BODYS QUAN: CPT | Performed by: INTERNAL MEDICINE

## 2023-01-01 PROCEDURE — 86901 BLOOD TYPING SEROLOGIC RH(D): CPT | Performed by: EMERGENCY MEDICINE

## 2023-01-01 PROCEDURE — 74176 CT ABD & PELVIS W/O CONTRAST: CPT

## 2023-01-01 PROCEDURE — 250N000011 HC RX IP 250 OP 636: Performed by: EMERGENCY MEDICINE

## 2023-01-01 PROCEDURE — 250N000011 HC RX IP 250 OP 636: Performed by: INTERNAL MEDICINE

## 2023-01-01 PROCEDURE — 94640 AIRWAY INHALATION TREATMENT: CPT

## 2023-01-01 PROCEDURE — 87040 BLOOD CULTURE FOR BACTERIA: CPT | Performed by: EMERGENCY MEDICINE

## 2023-01-01 PROCEDURE — 99291 CRITICAL CARE FIRST HOUR: CPT | Performed by: INTERNAL MEDICINE

## 2023-01-01 PROCEDURE — 97161 PT EVAL LOW COMPLEX 20 MIN: CPT | Mod: GP | Performed by: PHYSICAL THERAPIST

## 2023-01-01 PROCEDURE — 99207 PR APP CREDIT; MD BILLING SHARED VISIT: CPT | Performed by: INTERNAL MEDICINE

## 2023-01-01 PROCEDURE — 96361 HYDRATE IV INFUSION ADD-ON: CPT

## 2023-01-01 PROCEDURE — 99285 EMERGENCY DEPT VISIT HI MDM: CPT | Mod: 25

## 2023-01-01 PROCEDURE — 93005 ELECTROCARDIOGRAM TRACING: CPT

## 2023-01-01 PROCEDURE — 84145 PROCALCITONIN (PCT): CPT | Performed by: INTERNAL MEDICINE

## 2023-01-01 PROCEDURE — 71250 CT THORAX DX C-: CPT

## 2023-01-01 PROCEDURE — 96365 THER/PROPH/DIAG IV INF INIT: CPT

## 2023-01-01 PROCEDURE — 258N000003 HC RX IP 258 OP 636: Performed by: EMERGENCY MEDICINE

## 2023-01-01 PROCEDURE — 85027 COMPLETE CBC AUTOMATED: CPT | Performed by: INTERNAL MEDICINE

## 2023-01-01 PROCEDURE — 81001 URINALYSIS AUTO W/SCOPE: CPT | Performed by: EMERGENCY MEDICINE

## 2023-01-01 PROCEDURE — 96366 THER/PROPH/DIAG IV INF ADDON: CPT

## 2023-01-01 PROCEDURE — 99223 1ST HOSP IP/OBS HIGH 75: CPT | Performed by: INTERNAL MEDICINE

## 2023-01-01 PROCEDURE — 250N000009 HC RX 250: Performed by: INTERNAL MEDICINE

## 2023-01-01 PROCEDURE — 99239 HOSP IP/OBS DSCHRG MGMT >30: CPT | Performed by: INTERNAL MEDICINE

## 2023-01-01 PROCEDURE — 84484 ASSAY OF TROPONIN QUANT: CPT | Performed by: EMERGENCY MEDICINE

## 2023-01-01 RX ORDER — GABAPENTIN 100 MG/1
100 CAPSULE ORAL 2 TIMES DAILY
Status: DISCONTINUED | OUTPATIENT
Start: 2023-01-01 | End: 2023-01-01 | Stop reason: HOSPADM

## 2023-01-01 RX ORDER — AZITHROMYCIN 250 MG/1
250 TABLET, FILM COATED ORAL DAILY
Qty: 3 TABLET | Refills: 0 | Status: SHIPPED | OUTPATIENT
Start: 2023-01-01 | End: 2023-01-01

## 2023-01-01 RX ORDER — NALOXONE HYDROCHLORIDE 0.4 MG/ML
0.2 INJECTION, SOLUTION INTRAMUSCULAR; INTRAVENOUS; SUBCUTANEOUS
Status: DISCONTINUED | OUTPATIENT
Start: 2023-01-01 | End: 2023-01-01 | Stop reason: HOSPADM

## 2023-01-01 RX ORDER — MAGNESIUM HYDROXIDE/ALUMINUM HYDROXICE/SIMETHICONE 120; 1200; 1200 MG/30ML; MG/30ML; MG/30ML
30 SUSPENSION ORAL EVERY 4 HOURS PRN
Status: DISCONTINUED | OUTPATIENT
Start: 2023-01-01 | End: 2023-01-01 | Stop reason: HOSPADM

## 2023-01-01 RX ORDER — AZITHROMYCIN 500 MG/5ML
500 INJECTION, POWDER, LYOPHILIZED, FOR SOLUTION INTRAVENOUS ONCE
Status: COMPLETED | OUTPATIENT
Start: 2023-01-01 | End: 2023-01-01

## 2023-01-01 RX ORDER — CARVEDILOL 25 MG/1
25 TABLET ORAL 2 TIMES DAILY WITH MEALS
Status: DISCONTINUED | OUTPATIENT
Start: 2023-01-01 | End: 2023-01-01

## 2023-01-01 RX ORDER — CEFTRIAXONE 2 G/1
2 INJECTION, POWDER, FOR SOLUTION INTRAMUSCULAR; INTRAVENOUS ONCE
Status: COMPLETED | OUTPATIENT
Start: 2023-01-01 | End: 2023-01-01

## 2023-01-01 RX ORDER — POTASSIUM CHLORIDE 1500 MG/1
20 TABLET, EXTENDED RELEASE ORAL ONCE
Status: COMPLETED | OUTPATIENT
Start: 2023-01-01 | End: 2023-01-01

## 2023-01-01 RX ORDER — ACETAMINOPHEN 650 MG/1
650 SUPPOSITORY RECTAL EVERY 6 HOURS PRN
Status: DISCONTINUED | OUTPATIENT
Start: 2023-01-01 | End: 2023-01-01 | Stop reason: HOSPADM

## 2023-01-01 RX ORDER — CEFTRIAXONE 1 G/1
1 INJECTION, POWDER, FOR SOLUTION INTRAMUSCULAR; INTRAVENOUS EVERY 24 HOURS
Status: DISCONTINUED | OUTPATIENT
Start: 2023-01-01 | End: 2023-01-01

## 2023-01-01 RX ORDER — CARVEDILOL 12.5 MG/1
12.5 TABLET ORAL 2 TIMES DAILY WITH MEALS
Status: DISCONTINUED | OUTPATIENT
Start: 2023-01-01 | End: 2023-01-01 | Stop reason: HOSPADM

## 2023-01-01 RX ORDER — CEFTRIAXONE 1 G/1
1 INJECTION, POWDER, FOR SOLUTION INTRAMUSCULAR; INTRAVENOUS EVERY 24 HOURS
Status: DISCONTINUED | OUTPATIENT
Start: 2023-01-01 | End: 2023-01-01 | Stop reason: HOSPADM

## 2023-01-01 RX ORDER — ACETAMINOPHEN 325 MG/1
650 TABLET ORAL EVERY 6 HOURS PRN
Start: 2023-01-01

## 2023-01-01 RX ORDER — PROCHLORPERAZINE 25 MG
12.5 SUPPOSITORY, RECTAL RECTAL EVERY 12 HOURS PRN
Status: DISCONTINUED | OUTPATIENT
Start: 2023-01-01 | End: 2023-01-01 | Stop reason: HOSPADM

## 2023-01-01 RX ORDER — ALBUTEROL SULFATE 0.83 MG/ML
2.5 SOLUTION RESPIRATORY (INHALATION) EVERY 6 HOURS PRN
Status: DISCONTINUED | OUTPATIENT
Start: 2023-01-01 | End: 2023-01-01 | Stop reason: HOSPADM

## 2023-01-01 RX ORDER — ONDANSETRON 2 MG/ML
4 INJECTION INTRAMUSCULAR; INTRAVENOUS EVERY 6 HOURS PRN
Status: DISCONTINUED | OUTPATIENT
Start: 2023-01-01 | End: 2023-01-01 | Stop reason: HOSPADM

## 2023-01-01 RX ORDER — PANTOPRAZOLE SODIUM 40 MG/1
40 TABLET, DELAYED RELEASE ORAL
Status: DISCONTINUED | OUTPATIENT
Start: 2023-01-01 | End: 2023-01-01 | Stop reason: HOSPADM

## 2023-01-01 RX ORDER — ATORVASTATIN CALCIUM 40 MG/1
40 TABLET, FILM COATED ORAL EVERY EVENING
Status: DISCONTINUED | OUTPATIENT
Start: 2023-01-01 | End: 2023-01-01

## 2023-01-01 RX ORDER — PROCHLORPERAZINE MALEATE 5 MG
5 TABLET ORAL EVERY 6 HOURS PRN
Status: DISCONTINUED | OUTPATIENT
Start: 2023-01-01 | End: 2023-01-01 | Stop reason: HOSPADM

## 2023-01-01 RX ORDER — ALBUTEROL SULFATE 90 UG/1
2 AEROSOL, METERED RESPIRATORY (INHALATION) EVERY 6 HOURS
Status: DISCONTINUED | OUTPATIENT
Start: 2023-01-01 | End: 2023-01-01

## 2023-01-01 RX ORDER — CALCIUM CARBONATE 500 MG/1
1000 TABLET, CHEWABLE ORAL 2 TIMES DAILY PRN
Status: DISCONTINUED | OUTPATIENT
Start: 2023-01-01 | End: 2023-01-01 | Stop reason: HOSPADM

## 2023-01-01 RX ORDER — FERROUS SULFATE 325(65) MG
325 TABLET ORAL
Status: DISCONTINUED | OUTPATIENT
Start: 2023-01-01 | End: 2023-01-01 | Stop reason: HOSPADM

## 2023-01-01 RX ORDER — CEFDINIR 300 MG/1
300 CAPSULE ORAL DAILY
Status: DISCONTINUED | OUTPATIENT
Start: 2023-01-01 | End: 2023-01-01

## 2023-01-01 RX ORDER — OXYCODONE HYDROCHLORIDE 5 MG/1
2.5 TABLET ORAL EVERY 4 HOURS PRN
Qty: 12 TABLET | Refills: 0 | Status: SHIPPED | OUTPATIENT
Start: 2023-01-01

## 2023-01-01 RX ORDER — ONDANSETRON 4 MG/1
4 TABLET, ORALLY DISINTEGRATING ORAL EVERY 6 HOURS PRN
Status: DISCONTINUED | OUTPATIENT
Start: 2023-01-01 | End: 2023-01-01 | Stop reason: HOSPADM

## 2023-01-01 RX ORDER — CARVEDILOL 12.5 MG/1
12.5 TABLET ORAL 2 TIMES DAILY WITH MEALS
Qty: 60 TABLET | Refills: 0 | Status: SHIPPED | OUTPATIENT
Start: 2023-01-01 | End: 2023-10-16

## 2023-01-01 RX ORDER — CEFDINIR 300 MG/1
300 CAPSULE ORAL DAILY
Qty: 4 CAPSULE | Refills: 0 | Status: SHIPPED | OUTPATIENT
Start: 2023-01-01 | End: 2023-01-01

## 2023-01-01 RX ORDER — LIDOCAINE 40 MG/G
CREAM TOPICAL
Status: DISCONTINUED | OUTPATIENT
Start: 2023-01-01 | End: 2023-01-01 | Stop reason: HOSPADM

## 2023-01-01 RX ORDER — KETOCONAZOLE 20 MG/ML
SHAMPOO TOPICAL DAILY PRN
Status: DISCONTINUED | OUTPATIENT
Start: 2023-01-01 | End: 2023-01-01 | Stop reason: HOSPADM

## 2023-01-01 RX ORDER — CHLORTHALIDONE 25 MG/1
25 TABLET ORAL DAILY
Status: ON HOLD | COMMUNITY
End: 2023-01-01

## 2023-01-01 RX ORDER — ONDANSETRON 4 MG/1
4 TABLET, ORALLY DISINTEGRATING ORAL EVERY 6 HOURS PRN
Qty: 12 TABLET | Refills: 0 | Status: SHIPPED | OUTPATIENT
Start: 2023-01-01

## 2023-01-01 RX ORDER — ACETAMINOPHEN 325 MG/1
650 TABLET ORAL EVERY 6 HOURS PRN
Status: DISCONTINUED | OUTPATIENT
Start: 2023-01-01 | End: 2023-01-01 | Stop reason: HOSPADM

## 2023-01-01 RX ORDER — NALOXONE HYDROCHLORIDE 0.4 MG/ML
0.4 INJECTION, SOLUTION INTRAMUSCULAR; INTRAVENOUS; SUBCUTANEOUS
Status: DISCONTINUED | OUTPATIENT
Start: 2023-01-01 | End: 2023-01-01 | Stop reason: HOSPADM

## 2023-01-01 RX ORDER — KETOCONAZOLE 20 MG/ML
SHAMPOO TOPICAL DAILY PRN
COMMUNITY

## 2023-01-01 RX ORDER — AMLODIPINE BESYLATE 10 MG/1
10 TABLET ORAL EVERY MORNING
Status: DISCONTINUED | OUTPATIENT
Start: 2023-01-01 | End: 2023-01-01

## 2023-01-01 RX ORDER — ALBUTEROL SULFATE 90 UG/1
2 AEROSOL, METERED RESPIRATORY (INHALATION) EVERY 6 HOURS PRN
Status: DISCONTINUED | OUTPATIENT
Start: 2023-01-01 | End: 2023-01-01 | Stop reason: HOSPADM

## 2023-01-01 RX ORDER — AZITHROMYCIN 250 MG/1
250 TABLET, FILM COATED ORAL DAILY
Status: COMPLETED | OUTPATIENT
Start: 2023-01-01 | End: 2023-01-01

## 2023-01-01 RX ADMIN — ALBUTEROL SULFATE 2 PUFF: 90 AEROSOL, METERED RESPIRATORY (INHALATION) at 17:22

## 2023-01-01 RX ADMIN — OXYCODONE HYDROCHLORIDE 2.5 MG: 5 TABLET ORAL at 12:07

## 2023-01-01 RX ADMIN — GABAPENTIN 100 MG: 100 CAPSULE ORAL at 09:07

## 2023-01-01 RX ADMIN — SERTRALINE 150 MG: 50 TABLET, FILM COATED ORAL at 08:33

## 2023-01-01 RX ADMIN — CARVEDILOL 25 MG: 6.25 TABLET, FILM COATED ORAL at 17:42

## 2023-01-01 RX ADMIN — CEFDINIR 300 MG: 300 CAPSULE ORAL at 08:34

## 2023-01-01 RX ADMIN — ALUMINUM HYDROXIDE, MAGNESIUM HYDROXIDE, AND DIMETHICONE 30 ML: 200; 20; 200 SUSPENSION ORAL at 10:52

## 2023-01-01 RX ADMIN — CEFTRIAXONE 2 G: 2 INJECTION, POWDER, FOR SOLUTION INTRAMUSCULAR; INTRAVENOUS at 11:56

## 2023-01-01 RX ADMIN — ATORVASTATIN CALCIUM 40 MG: 40 TABLET, FILM COATED ORAL at 21:03

## 2023-01-01 RX ADMIN — PANTOPRAZOLE SODIUM 40 MG: 40 TABLET, DELAYED RELEASE ORAL at 06:38

## 2023-01-01 RX ADMIN — GABAPENTIN 100 MG: 100 CAPSULE ORAL at 20:02

## 2023-01-01 RX ADMIN — PANTOPRAZOLE SODIUM 40 MG: 40 TABLET, DELAYED RELEASE ORAL at 07:00

## 2023-01-01 RX ADMIN — ALBUTEROL SULFATE 2 PUFF: 90 AEROSOL, METERED RESPIRATORY (INHALATION) at 04:33

## 2023-01-01 RX ADMIN — FERROUS SULFATE TAB 325 MG (65 MG ELEMENTAL FE) 325 MG: 325 (65 FE) TAB at 09:07

## 2023-01-01 RX ADMIN — ALBUTEROL SULFATE 2.5 MG: 2.5 SOLUTION RESPIRATORY (INHALATION) at 05:07

## 2023-01-01 RX ADMIN — CARVEDILOL 12.5 MG: 12.5 TABLET, FILM COATED ORAL at 19:59

## 2023-01-01 RX ADMIN — ALBUTEROL SULFATE 2 PUFF: 90 AEROSOL, METERED RESPIRATORY (INHALATION) at 08:09

## 2023-01-01 RX ADMIN — CARVEDILOL 12.5 MG: 12.5 TABLET, FILM COATED ORAL at 09:07

## 2023-01-01 RX ADMIN — FERROUS SULFATE TAB 325 MG (65 MG ELEMENTAL FE) 325 MG: 325 (65 FE) TAB at 08:34

## 2023-01-01 RX ADMIN — OXYCODONE HYDROCHLORIDE 2.5 MG: 5 TABLET ORAL at 08:39

## 2023-01-01 RX ADMIN — ACETAMINOPHEN 650 MG: 325 TABLET, FILM COATED ORAL at 10:29

## 2023-01-01 RX ADMIN — ALUMINUM HYDROXIDE, MAGNESIUM HYDROXIDE, AND DIMETHICONE 30 ML: 200; 20; 200 SUSPENSION ORAL at 17:09

## 2023-01-01 RX ADMIN — CARVEDILOL 12.5 MG: 12.5 TABLET, FILM COATED ORAL at 19:02

## 2023-01-01 RX ADMIN — CEFTRIAXONE 1 G: 1 INJECTION, POWDER, FOR SOLUTION INTRAMUSCULAR; INTRAVENOUS at 06:55

## 2023-01-01 RX ADMIN — CARVEDILOL 12.5 MG: 12.5 TABLET, FILM COATED ORAL at 17:09

## 2023-01-01 RX ADMIN — POTASSIUM CHLORIDE 20 MEQ: 1500 TABLET, EXTENDED RELEASE ORAL at 08:33

## 2023-01-01 RX ADMIN — POTASSIUM CHLORIDE 20 MEQ: 1500 TABLET, EXTENDED RELEASE ORAL at 09:07

## 2023-01-01 RX ADMIN — CEFDINIR 300 MG: 300 CAPSULE ORAL at 09:33

## 2023-01-01 RX ADMIN — ACETAMINOPHEN 650 MG: 325 TABLET, FILM COATED ORAL at 18:51

## 2023-01-01 RX ADMIN — ONDANSETRON 4 MG: 4 TABLET, ORALLY DISINTEGRATING ORAL at 16:44

## 2023-01-01 RX ADMIN — ALBUTEROL SULFATE 2 PUFF: 90 AEROSOL, METERED RESPIRATORY (INHALATION) at 20:18

## 2023-01-01 RX ADMIN — CARVEDILOL 12.5 MG: 12.5 TABLET, FILM COATED ORAL at 08:33

## 2023-01-01 RX ADMIN — SERTRALINE 150 MG: 50 TABLET, FILM COATED ORAL at 08:38

## 2023-01-01 RX ADMIN — AZITHROMYCIN MONOHYDRATE 250 MG: 250 TABLET ORAL at 08:33

## 2023-01-01 RX ADMIN — ONDANSETRON 4 MG: 4 TABLET, ORALLY DISINTEGRATING ORAL at 08:39

## 2023-01-01 RX ADMIN — GABAPENTIN 100 MG: 100 CAPSULE ORAL at 20:09

## 2023-01-01 RX ADMIN — POTASSIUM CHLORIDE 20 MEQ: 1500 TABLET, EXTENDED RELEASE ORAL at 12:08

## 2023-01-01 RX ADMIN — FERROUS SULFATE TAB 325 MG (65 MG ELEMENTAL FE) 325 MG: 325 (65 FE) TAB at 08:38

## 2023-01-01 RX ADMIN — CEFDINIR 300 MG: 300 CAPSULE ORAL at 09:07

## 2023-01-01 RX ADMIN — FLUTICASONE FUROATE 1 PUFF: 200 POWDER RESPIRATORY (INHALATION) at 08:09

## 2023-01-01 RX ADMIN — CALCIUM CARBONATE (ANTACID) CHEW TAB 500 MG 1000 MG: 500 CHEW TAB at 07:05

## 2023-01-01 RX ADMIN — GABAPENTIN 100 MG: 100 CAPSULE ORAL at 20:00

## 2023-01-01 RX ADMIN — GABAPENTIN 100 MG: 100 CAPSULE ORAL at 21:03

## 2023-01-01 RX ADMIN — CARVEDILOL 12.5 MG: 12.5 TABLET, FILM COATED ORAL at 08:34

## 2023-01-01 RX ADMIN — ALBUTEROL SULFATE 2 PUFF: 90 AEROSOL, METERED RESPIRATORY (INHALATION) at 13:55

## 2023-01-01 RX ADMIN — ALBUTEROL SULFATE 2 PUFF: 90 AEROSOL, METERED RESPIRATORY (INHALATION) at 09:10

## 2023-01-01 RX ADMIN — CALCIUM CARBONATE (ANTACID) CHEW TAB 500 MG 1000 MG: 500 CHEW TAB at 08:33

## 2023-01-01 RX ADMIN — PANTOPRAZOLE SODIUM 40 MG: 40 TABLET, DELAYED RELEASE ORAL at 16:57

## 2023-01-01 RX ADMIN — GABAPENTIN 100 MG: 100 CAPSULE ORAL at 08:34

## 2023-01-01 RX ADMIN — OXYCODONE HYDROCHLORIDE 2.5 MG: 5 TABLET ORAL at 17:43

## 2023-01-01 RX ADMIN — AZITHROMYCIN MONOHYDRATE 250 MG: 250 TABLET ORAL at 08:34

## 2023-01-01 RX ADMIN — CALCIUM CARBONATE (ANTACID) CHEW TAB 500 MG 1000 MG: 500 CHEW TAB at 09:51

## 2023-01-01 RX ADMIN — SERTRALINE 150 MG: 50 TABLET, FILM COATED ORAL at 09:07

## 2023-01-01 RX ADMIN — SERTRALINE 150 MG: 50 TABLET, FILM COATED ORAL at 09:45

## 2023-01-01 RX ADMIN — GABAPENTIN 100 MG: 100 CAPSULE ORAL at 08:33

## 2023-01-01 RX ADMIN — CEFTRIAXONE 1 G: 1 INJECTION, POWDER, FOR SOLUTION INTRAMUSCULAR; INTRAVENOUS at 06:15

## 2023-01-01 RX ADMIN — OXYCODONE HYDROCHLORIDE 2.5 MG: 5 TABLET ORAL at 22:32

## 2023-01-01 RX ADMIN — AZITHROMYCIN MONOHYDRATE 500 MG: 500 INJECTION, POWDER, LYOPHILIZED, FOR SOLUTION INTRAVENOUS at 12:45

## 2023-01-01 RX ADMIN — CARVEDILOL 12.5 MG: 12.5 TABLET, FILM COATED ORAL at 08:39

## 2023-01-01 RX ADMIN — ALBUTEROL SULFATE 2 PUFF: 90 AEROSOL, METERED RESPIRATORY (INHALATION) at 19:14

## 2023-01-01 RX ADMIN — GABAPENTIN 100 MG: 100 CAPSULE ORAL at 09:46

## 2023-01-01 RX ADMIN — ONDANSETRON 4 MG: 4 TABLET, ORALLY DISINTEGRATING ORAL at 05:41

## 2023-01-01 RX ADMIN — GABAPENTIN 100 MG: 100 CAPSULE ORAL at 08:39

## 2023-01-01 RX ADMIN — ALBUTEROL SULFATE 2 PUFF: 90 AEROSOL, METERED RESPIRATORY (INHALATION) at 08:07

## 2023-01-01 RX ADMIN — FERROUS SULFATE TAB 325 MG (65 MG ELEMENTAL FE) 325 MG: 325 (65 FE) TAB at 08:33

## 2023-01-01 RX ADMIN — FLUTICASONE FUROATE 1 PUFF: 200 POWDER RESPIRATORY (INHALATION) at 08:42

## 2023-01-01 RX ADMIN — CALCIUM CARBONATE (ANTACID) CHEW TAB 500 MG 1000 MG: 500 CHEW TAB at 08:39

## 2023-01-01 RX ADMIN — AZITHROMYCIN MONOHYDRATE 250 MG: 250 TABLET ORAL at 09:45

## 2023-01-01 RX ADMIN — CALCIUM CARBONATE (ANTACID) CHEW TAB 500 MG 1000 MG: 500 CHEW TAB at 17:16

## 2023-01-01 RX ADMIN — ALBUTEROL SULFATE 2 PUFF: 90 AEROSOL, METERED RESPIRATORY (INHALATION) at 16:56

## 2023-01-01 RX ADMIN — ACETAMINOPHEN 650 MG: 325 TABLET, FILM COATED ORAL at 20:00

## 2023-01-01 RX ADMIN — FLUTICASONE FUROATE 1 PUFF: 200 POWDER RESPIRATORY (INHALATION) at 09:10

## 2023-01-01 RX ADMIN — Medication 1 LOZENGE: at 10:46

## 2023-01-01 RX ADMIN — AZITHROMYCIN MONOHYDRATE 250 MG: 250 TABLET ORAL at 09:07

## 2023-01-01 RX ADMIN — ACETAMINOPHEN 650 MG: 325 TABLET, FILM COATED ORAL at 13:55

## 2023-01-01 RX ADMIN — FERROUS SULFATE TAB 325 MG (65 MG ELEMENTAL FE) 325 MG: 325 (65 FE) TAB at 09:46

## 2023-01-01 RX ADMIN — PANTOPRAZOLE SODIUM 40 MG: 40 TABLET, DELAYED RELEASE ORAL at 06:59

## 2023-01-01 RX ADMIN — ALBUTEROL SULFATE 2 PUFF: 90 AEROSOL, METERED RESPIRATORY (INHALATION) at 20:27

## 2023-01-01 RX ADMIN — ONDANSETRON 4 MG: 4 TABLET, ORALLY DISINTEGRATING ORAL at 17:21

## 2023-01-01 RX ADMIN — CARVEDILOL 12.5 MG: 12.5 TABLET, FILM COATED ORAL at 17:21

## 2023-01-01 RX ADMIN — PANTOPRAZOLE SODIUM 40 MG: 40 TABLET, DELAYED RELEASE ORAL at 06:55

## 2023-01-01 RX ADMIN — ALBUTEROL SULFATE 2 PUFF: 90 AEROSOL, METERED RESPIRATORY (INHALATION) at 15:07

## 2023-01-01 RX ADMIN — CALCIUM CARBONATE (ANTACID) CHEW TAB 500 MG 1000 MG: 500 CHEW TAB at 16:57

## 2023-01-01 RX ADMIN — SODIUM CHLORIDE 1000 ML: 9 INJECTION, SOLUTION INTRAVENOUS at 10:25

## 2023-01-01 RX ADMIN — PANTOPRAZOLE SODIUM 40 MG: 40 TABLET, DELAYED RELEASE ORAL at 06:41

## 2023-01-01 ASSESSMENT — ACTIVITIES OF DAILY LIVING (ADL)
ADLS_ACUITY_SCORE: 35
WEAR_GLASSES_OR_BLIND: YES
ADLS_ACUITY_SCORE: 24
ADLS_ACUITY_SCORE: 30
ADLS_ACUITY_SCORE: 35
ADLS_ACUITY_SCORE: 24
ADLS_ACUITY_SCORE: 24
ADLS_ACUITY_SCORE: 30
CONCENTRATING,_REMEMBERING_OR_MAKING_DECISIONS_DIFFICULTY: YES
ADLS_ACUITY_SCORE: 24
ADLS_ACUITY_SCORE: 21
ADLS_ACUITY_SCORE: 35
ADLS_ACUITY_SCORE: 34
ADLS_ACUITY_SCORE: 24
ADLS_ACUITY_SCORE: 21
DOING_ERRANDS_INDEPENDENTLY_DIFFICULTY: NO
ADLS_ACUITY_SCORE: 24
ADLS_ACUITY_SCORE: 29
ADLS_ACUITY_SCORE: 35
ADLS_ACUITY_SCORE: 24
ADLS_ACUITY_SCORE: 30
ADLS_ACUITY_SCORE: 24
ADLS_ACUITY_SCORE: 30
ADLS_ACUITY_SCORE: 24
ADLS_ACUITY_SCORE: 25
ADLS_ACUITY_SCORE: 24
DRESSING/BATHING_DIFFICULTY: NO
ADLS_ACUITY_SCORE: 24
ADLS_ACUITY_SCORE: 35
ADLS_ACUITY_SCORE: 24
ADLS_ACUITY_SCORE: 25
ADLS_ACUITY_SCORE: 30
CHANGE_IN_FUNCTIONAL_STATUS_SINCE_ONSET_OF_CURRENT_ILLNESS/INJURY: NO
ADLS_ACUITY_SCORE: 24
ADLS_ACUITY_SCORE: 35
DEPENDENT_IADLS:: MEAL PREPARATION;MEDICATION MANAGEMENT
ADLS_ACUITY_SCORE: 24
ADLS_ACUITY_SCORE: 34
ADLS_ACUITY_SCORE: 35
ADLS_ACUITY_SCORE: 25
WALKING_OR_CLIMBING_STAIRS_DIFFICULTY: NO
ADLS_ACUITY_SCORE: 29
ADLS_ACUITY_SCORE: 25
ADLS_ACUITY_SCORE: 24
ADLS_ACUITY_SCORE: 21
ADLS_ACUITY_SCORE: 24
ADLS_ACUITY_SCORE: 24
DIFFICULTY_EATING/SWALLOWING: NO
EQUIPMENT_CURRENTLY_USED_AT_HOME: GRAB BAR, TOILET;GRAB BAR, TUB/SHOWER;SHOWER CHAIR
ADLS_ACUITY_SCORE: 24
ADLS_ACUITY_SCORE: 24
ADLS_ACUITY_SCORE: 33
FALL_HISTORY_WITHIN_LAST_SIX_MONTHS: NO
ADLS_ACUITY_SCORE: 35
ADLS_ACUITY_SCORE: 21
ADLS_ACUITY_SCORE: 24
VISION_MANAGEMENT: GLASSES
ADLS_ACUITY_SCORE: 24
ADLS_ACUITY_SCORE: 25
ADLS_ACUITY_SCORE: 24
ADLS_ACUITY_SCORE: 30
ADLS_ACUITY_SCORE: 21
ADLS_ACUITY_SCORE: 24
ADLS_ACUITY_SCORE: 35
ADLS_ACUITY_SCORE: 35
ADLS_ACUITY_SCORE: 21
TOILETING_ISSUES: NO
ADLS_ACUITY_SCORE: 24

## 2023-09-07 NOTE — ED TRIAGE NOTES
Aprox 1 week ago pt c/o fevers up to 101. Pt end stage renal disease. Fever resolved. This am pt had BM when a large amount bright red blood. Hx hemorrhoids with banding and colitis. Pt c/o dizziness, fatigue, and generalized weakness 1 week. ABC in tact. A/OX4

## 2023-09-08 NOTE — ED PROVIDER NOTES
History     Chief Complaint:  Rectal Bleeding       The history is provided by the patient and a relative (daughter).      Inez Collier is a 89 year old female with history of colitis, skin cancer, hypertension, and hemorrhoids who presents to the ED with rectal bleeding and abdominal pain. Patient reports she went to bathroom this morning. Patient went to wipe when she suddenly began to bleed a lot of bright red blood. Patient endorses lower abdominal pain for several months. Of note, patient last weekend was traveling with family and reported experiencing imbalance, dizziness, and a fever of 101. Patient's daughter additionally reports patient has been having black stool.     Independent Historian:   Daughter - They report as noted above.    Review of External Notes:   Reviewed 4/24/2023 oncology visit regarding anemia    Medications:    Zoloft  Zofran  Benicar  Neurontin  Ferosul  Pepcid  Coreg  Lipitor  Aspirin 81 mg  Norvasc  Albuterol inhaler   Tylenol   Antivert  Xanax  Pulmicort   Cozaar    Past Medical History:    Asthma   Chronic kidney disease   Colitis  Hypertension   Anemia   Gastroesophageal reflux disease   Amaurosis fugax of left eye  Anxiety   Transient ischemic attack   Chronic kidney disease, stage 4  Bowen's disease, left dorsal hand  Tobacco abuse  Hyperlipidemia   Hyponatremia  Pulmonary nodule  Squamous cell carcinoma of skin of face  Skin cancer    Past Surgical History:    Hysterectomy   Ectopic pregnancy surgery  Tubal ligation  Benign skin lesion excision    Physical Exam   Patient Vitals for the past 24 hrs:   BP Temp Temp src Pulse Resp SpO2 Weight   09/07/23 2309 (!) 188/96 -- -- 68 -- 94 % --   09/07/23 1931 (!) 178/93 -- -- 64 -- 96 % --   09/07/23 1525 (!) 163/99 97.7  F (36.5  C) Temporal 63 14 95 % 52.2 kg (115 lb 1.3 oz)        Physical Exam  Constitutional: Alert, attentive  HENT:    Nose: Nose normal.    Mouth/Throat: Oropharynx is clear, mucous membranes are moist   Eyes: EOM  are normal.   CV: regular rate and rhythm; no murmurs, rubs or gallups  Chest: Effort normal and breath sounds normal.   GI:  There is no tenderness. No distension. Normal bowel sounds  MSK: Normal range of motion.   Neurological: Alert, attentive  Skin: Skin is warm and dry.      Emergency Department Course     Imaging:  Abd/pelvis CT no contrast - Stone Protocol   Final Result   IMPRESSION:    1.  Left colonic diverticulosis, without evidence for diverticulitis.   2.  Advanced atherosclerotic disease, including a 3.5 x 2.9 cm abdominal aortic aneurysm that has slightly increased in size.   3.  Left renal cysts.   4.  No other findings to account for the patient's symptoms.            Report per radiology    Laboratory:  Labs Ordered and Resulted from Time of ED Arrival to Time of ED Departure   ROUTINE UA WITH MICROSCOPIC REFLEX TO CULTURE - Abnormal       Result Value    Color Urine Light Yellow      Appearance Urine Clear      Glucose Urine Negative      Bilirubin Urine Negative      Ketones Urine Negative      Specific Gravity Urine 1.011      Blood Urine Small (*)     pH Urine 5.5      Protein Albumin Urine 100 (*)     Urobilinogen Urine Normal      Nitrite Urine Negative      Leukocyte Esterase Urine Negative      Bacteria Urine Few (*)     Mucus Urine Present (*)     RBC Urine 2      WBC Urine 1     BASIC METABOLIC PANEL - Abnormal    Sodium 138      Potassium 3.8      Chloride 102      Carbon Dioxide (CO2) 24      Anion Gap 12      Urea Nitrogen 59.8 (*)     Creatinine 3.88 (*)     Calcium 9.1      Glucose 125 (*)     GFR Estimate 10 (*)    CBC WITH PLATELETS AND DIFFERENTIAL - Abnormal    WBC Count 7.1      RBC Count 2.95 (*)     Hemoglobin 8.5 (*)     Hematocrit 25.5 (*)     MCV 86      MCH 28.8      MCHC 33.3      RDW 13.1      Platelet Count 208      % Neutrophils 68      % Lymphocytes 16      % Monocytes 9      % Eosinophils 6      % Basophils 1      % Immature Granulocytes 0      NRBCs per 100 WBC 0       Absolute Neutrophils 4.8      Absolute Lymphocytes 1.1      Absolute Monocytes 0.6      Absolute Eosinophils 0.4      Absolute Basophils 0.0      Absolute Immature Granulocytes 0.0      Absolute NRBCs 0.0     HEPATIC FUNCTION PANEL - Normal    Protein Total 7.1      Albumin 4.2      Bilirubin Total 0.2      Alkaline Phosphatase 65      AST 31      ALT 12      Bilirubin Direct <0.20     TYPE AND SCREEN, ADULT    ABO/RH(D) O POS      Antibody Screen Negative      SPECIMEN EXPIRATION DATE [id number]     ABO/RH TYPE AND SCREEN      Emergency Department Course & Assessments:      Assessments:  1920 I obtained the history and examined the patient as noted above    Independent Interpretation (X-rays, CTs, rhythm strip):  No free fluid on CT abdomen pelvis    Consultations/Discussion of Management or Tests:  None        Social Determinants of Health affecting care:   None    Disposition:  The patient was discharged to home.     Impression & Plan      Medical Decision Making:  This is a pleasant 89-year-old female with history of CKD, anemia due to chronic kidney disease, and limited goals of care (e.g., she is not pursuing dialysis), who presents for evaluation after an episode of BRBPR.  Symptoms are described as above.  She is hemodynamically stable and has not had subsequent issues, with her diarrhea or BRBPR.  She is not anticoagulated.  Unfortunate, the patient had a prolonged delay to evaluation due to ED/hospital capacity issues.  After 4 hours of waiting, the patient remains with normal stable vitals aside from hypertension which is baseline.  Hemoglobin is reassuringly higher than it typically is.  CT abdomen pelvis was performed given vague abdominal pain which could represent diverticulitis or other, and shows no acute abnormality.  CKD is slowly progressing, but patient and daughter validate she is not interested in dialysis.  After approximately 8 hours in the emergency department, the patient remains  asymptomatic and would like to be discharged home.  The patient and daughter demonstrate understanding this could represent ongoing GI bleeding.  Understanding her goals of care, I believe this is reasonable.  She will return immediately for any persistent bleeding, recurrent pain, symptoms that might represent worsening anemia (shortness of breath, chest pain, dizziness etc.), or other concerns.  Primary care follow-up for recheck of labs and recheck in general in 2 to 3 days.      Diagnosis:    ICD-10-CM    1. BRBPR (bright red blood per rectum)  K62.5       2. Chronic anemia  D64.9            Discharge Medications:  Discharge Medication List as of 9/7/2023 11:13 PM          Scribe Disclosure:  I, Pepe Burgess, am serving as a scribe at 12:10 AM on 9/8/2023 to document services personally performed by Michael Salmeron MD  based on my observations and the provider's statements to me.     9/7/2023   Michael Salmeron MD Houghland, John Eric, MD  09/08/23 0207

## 2023-09-08 NOTE — ED NOTES
Rapid Assessment Note      History:  1920  Inez Collier is a 89 year old female with history of colitis and hemorrhoids who presents to the ED with rectal bleeding and abdominal pain. Patient reports she went to bathroom this morning. Patient went to wipe when she suddenly began to bleed a lot of bright red blood. Patient endorses lower abdominal pain for several months. Of note, patient last weekend was traveling with family and reported experiencing imbalance, dizziness, and a fever of 101. Patient's daughter additionally reports patient has been having black stool.     Exam:   RRR no MRG  LLQ ttp    Plan of Care:   I evaluated the patient and developed an initial plan of care. I discussed this plan and explained that I, or one of my partners, would be returning to complete the evaluation.      Michael Salmeron MD  09/07/23 1949

## 2023-09-14 PROBLEM — J69.0 ASPIRATION PNEUMONITIS (H): Status: ACTIVE | Noted: 2023-01-01

## 2023-09-14 PROBLEM — R11.10 VOMITING AND DIARRHEA: Status: ACTIVE | Noted: 2023-01-01

## 2023-09-14 PROBLEM — D64.9 CHRONIC ANEMIA: Status: ACTIVE | Noted: 2023-01-01

## 2023-09-14 PROBLEM — J96.01 ACUTE HYPOXEMIC RESPIRATORY FAILURE (H): Status: ACTIVE | Noted: 2023-01-01

## 2023-09-14 PROBLEM — N17.9 ACUTE KIDNEY INJURY SUPERIMPOSED ON CKD (H): Status: ACTIVE | Noted: 2023-01-01

## 2023-09-14 PROBLEM — N18.9 ACUTE KIDNEY INJURY SUPERIMPOSED ON CKD (H): Status: ACTIVE | Noted: 2023-01-01

## 2023-09-14 PROBLEM — R19.7 VOMITING AND DIARRHEA: Status: ACTIVE | Noted: 2023-01-01

## 2023-09-14 PROBLEM — E87.6 HYPOKALEMIA: Status: ACTIVE | Noted: 2023-01-01

## 2023-09-14 NOTE — PHARMACY-ADMISSION MEDICATION HISTORY
Pharmacist Admission Medication History    Admission medication history is complete. The information provided in this note is only as accurate as the sources available at the time of the update.    Medication reconciliation/reorder completed by provider prior to medication history? No    Information Source(s): Patient and Family member via in-person    Pertinent Information: Pt's family states olmesartan was stopped and switched to chlorthalidone d/t availability. Pt's family states it was mentioned to start her on hydralazine, but was never prescribed.    Changes made to PTA medication list:  Added: chlorthalidone, omeprazole, ketoconazole shampoo  Deleted: dextromethorphan, olmesartan  Changed: None       Allergies reviewed with patient and updates made in EHR: yes    Medication History Completed By: Maya Mendieta RPH 9/14/2023 2:37 PM    Prior to Admission medications    Medication Sig Last Dose Taking? Auth Provider Long Term End Date   albuterol (PROAIR HFA/PROVENTIL HFA/VENTOLIN HFA) 108 (90 Base) MCG/ACT inhaler Inhale 2 puffs into the lungs every 6 hours prn at prn Yes Unknown, Entered By History Yes    albuterol (PROVENTIL) (2.5 MG/3ML) 0.083% neb solution Take 1 vial (2.5 mg) by nebulization every 6 hours as needed for shortness of breath / dyspnea or wheezing prn at prn Yes Kwadwo Kimball MD Yes    amLODIPine (NORVASC) 10 MG tablet Take 10 mg by mouth every morning 9/13/2023 at am Yes Unknown, Entered By History No    aspirin (ASA) 81 MG tablet Take 81 mg by mouth daily 9/13/2023 at am Yes Unknown, Entered By History No    atorvastatin (LIPITOR) 40 MG tablet Take 40 mg by mouth daily 9/13/2023 at pm Yes Unknown, Entered By History No    Calcium Citrate-Vitamin D (CALCIUM CITRATE + D3) 200-250 MG-UNIT TABS Take 2 tablets by mouth 2 times daily 9/13/2023 at x2 Yes Unknown, Entered By History     carvedilol (COREG) 25 MG tablet Take 1 tablet (25 mg) by mouth 2 times daily (with meals) 9/13/2023 at x2  Yes Wellington Pepper MD Yes    chlorthalidone (HYGROTON) 25 MG tablet Take 25 mg by mouth daily 9/13/2023 at am Yes Unknown, Entered By History Yes    ferrous sulfate (FEROSUL) 325 (65 Fe) MG tablet Take 325 mg by mouth daily (with breakfast) 9/13/2023 at am Yes Unknown, Entered By History     fluticasone (ARNUITY ELLIPTA) 200 MCG/ACT inhaler Inhale 1 puff into the lungs daily 9/14/2023 at am Yes Unknown, Entered By History     gabapentin (NEURONTIN) 100 MG capsule Take 100 mg by mouth 2 times daily 9/13/2023 at x2 Yes Reported, Patient Yes    glucosamine-chondroitin 500-400 MG CAPS Take 3 capsules by mouth every morning 9/13/2023 at am Yes Unknown, Entered By History Yes    ketoconazole (NIZORAL) 2 % external shampoo Apply topically daily as needed for itching or irritation prn at prn Yes Unknown, Entered By History     omeprazole (PRILOSEC) 20 MG DR capsule Take 20 mg by mouth daily 9/13/2023 at am Yes Unknown, Entered By History     ondansetron (ZOFRAN-ODT) 4 MG ODT tab Take 1 tablet (4 mg) by mouth every 8 hours as needed for nausea prn at prn Yes Kwadwo Kimball MD     sertraline (ZOLOFT) 100 MG tablet Take 150 mg by mouth every morning 9/13/2023 at am Yes Reported, Patient Yes

## 2023-09-14 NOTE — PROGRESS NOTES
Transfer Type: [facility]  Transfer Triage Note    Date of call: 09/14/23  Time of call: 3:05 PM    Current Patient Location:  [facility]  Current Level of Care: ED    Vitals: Temp: 98.4  F (36.9  C) Temp src: Oral BP: 135/68 Pulse: 67   Resp: 12 SpO2: 94 %   Weight: 52.2 kg (115 lb)  O2 Device: Nasal cannula at Oxygen Delivery: 2 LPM  Diagnosis: Aspiration pneumonia  Is COVID-19 a concern? No  Reason for requested transfer:  No beds at [facility]    Isolation Needs: None    Outside Records: Available  Additional records may be faxed to [phone].    Transfer accepted: Yes  Stability of Patient: Patient is vitally stable, with no critical labs, and will likely remain stable throughout the transfer process  Level of Care Needed: Med Surg  Telemetry Needed:  Med (Remote) Telemetry  Expected Time of Arrival for Transfer: 8-24 hours  Arrival Location:  [facility] or [place]    Recommendations for Management and Stabilization: Not needed    Additional Comments:   89 year old female with CKD who presents with gastro enteritis, tung on ckd, and aspiration pneumonia now on o2. Creatinine up to 4.7. Does not want to start dialysis.     Jorge Ware MD

## 2023-09-14 NOTE — ED PROVIDER NOTES
History     Chief Complaint:  Shortness of Breath     HPI   Inez Collier is a 89 year old female with history of CKD, anemia due to chronic kidney disease, hypertension,a nd COPD who presents to the ED for evaluation of confusion and low O2 sats. The patient was evaluated at [facility] ED last week for rectal bleeding. Her rectal bleeding resolved but returned roughly two days ago when she developed nausea, vomiting, and URI symptoms. Yesterday the patient continued to have vomiting, abdominal pain and bloating, and fatigue. She had multiple emesis over night and EMS was called, noting the patient was sating 70% on room air. Family has noticed the patient has been more confused over the last week.    Independent Historian:   None - Patient Only    Review of External Notes:   I reviewed 9/12/23 office visit     Medications:    Zoloft  Zofran  Benicar  Neurontin  Ferosul  Pepcid  Coreg  Lipitor  Aspirin 81 mg  Norvasc  Albuterol inhaler   Tylenol   Antivert  Xanax  Pulmicort   Cozaar     Past Medical History:    Asthma   Chronic kidney disease   Colitis  Hypertension   Anemia   Gastroesophageal reflux disease   Amaurosis fugax of left eye  Anxiety   Transient ischemic attack   Chronic kidney disease, stage 4  Bowen's disease, left dorsal hand  Tobacco abuse  Hyperlipidemia   Hyponatremia  Pulmonary nodule  Squamous cell carcinoma of skin of face  Skin cancer     Past Surgical History:    Hysterectomy   Ectopic pregnancy surgery  Tubal ligation  Benign skin lesion excision    Physical Exam   Patient Vitals for the past 24 hrs:   BP Temp Temp src Pulse Resp SpO2 Weight   09/14/23 1115 117/68 -- -- -- 17 91 % --   09/14/23 1045 91/70 -- -- 66 (!) 33 91 % --   09/14/23 1030 106/76 -- -- 69 19 91 % --   09/14/23 1026 104/74 -- -- 68 21 90 % --   09/14/23 0945 99/58 -- -- 69 -- 92 % --   09/14/23 0935 (!) 83/53 98.4  F (36.9  C) Oral 73 20 96 % 52.2 kg (115 lb)        Physical Exam  Constitutional: Alert, attentive, mildly  confused to recent events  HENT:    Nose: Nose normal.    Mouth/Throat: Oropharynx is clear, mucous membranes are moist   Eyes: EOM are normal.   CV: regular rate and rhythm; no murmurs, rubs or gallups  Chest: Effort normal and breath sounds normal.   GI:  There is no tenderness. No distension. Normal bowel sounds  MSK: Normal range of motion.   Neurological: Alert, attentive  Skin: Skin is warm and dry.      Emergency Department Course   ECG  ECG taken at 1042, ECG read at 1106  No STEMI  Normal sinus rhythm  Right bundle branch block  Minimal voltage criteria for LVH, may be normal variant  T wave abnormality, consider lateral ischemia  Abnormal ECG   Rate 67 bpm. OH interval 180 ms. QRS duration 142 ms. QT/QTc 482/509 ms. P-R-T axes 23/-6/-17.     Imaging:  CT Chest Abdomen Pelvis w/o Contrast   Final Result   IMPRESSION:   1.  Moderate left upper and left lower lobe airspace disease and   minimal right upper lobe infiltrates consistent with pneumonia.   2.  Right upper lobe are 1.4 cm opacity which may be related to   pneumonia. Recommend 3 month CT chest follow-up.   3.  No acute findings in the abdomen or pelvis. No inflammatory   process, hydronephrosis or bowel obstruction.      NANCY BREWSTER MD            SYSTEM ID:  [id number]         Report per radiology    Laboratory:  Labs Ordered and Resulted from Time of ED Arrival to Time of ED Departure   COMPREHENSIVE METABOLIC PANEL - Abnormal       Result Value    Sodium 136      Potassium 2.9 (*)     Chloride 93 (*)     Carbon Dioxide (CO2) 26      Anion Gap 17 (*)     Urea Nitrogen 64.4 (*)     Creatinine 4.75 (*)     Calcium 9.5      Glucose 118 (*)     Alkaline Phosphatase 67      AST 32      ALT 12      Protein Total 7.4      Albumin 4.5      Bilirubin Total 0.3      GFR Estimate 8 (*)    TROPONIN T, HIGH SENSITIVITY - Abnormal    Troponin T, High Sensitivity 74 (*)    CBC WITH PLATELETS AND DIFFERENTIAL - Abnormal    WBC Count 23.6 (*)     RBC Count  3.23 (*)     Hemoglobin 9.4 (*)     Hematocrit 27.7 (*)     MCV 86      MCH 29.1      MCHC 33.9      RDW 12.9      Platelet Count 245      % Neutrophils 92      % Lymphocytes 3      % Monocytes 4      % Eosinophils 0      % Basophils 0      % Immature Granulocytes 1      NRBCs per 100 WBC 0      Absolute Neutrophils 21.9 (*)     Absolute Lymphocytes 0.7 (*)     Absolute Monocytes 0.9      Absolute Eosinophils 0.0      Absolute Basophils 0.1      Absolute Immature Granulocytes 0.1      Absolute NRBCs 0.0     INFLUENZA A/B, RSV, & SARS-COV2 PCR - Normal    Influenza A PCR Negative      Influenza B PCR Negative      RSV PCR Negative      SARS CoV2 PCR Negative     LACTIC ACID WHOLE BLOOD - Normal    Lactic Acid 0.7     INFLUENZA A/B, RSV, & SARS-COV2 PCR   BLOOD CULTURE   BLOOD CULTURE      Emergency Department Course & Assessments:     Interventions:  Medications   cefTRIAXone (ROCEPHIN) 2 g vial to attach to  ml bag for ADULTS or NS 50 ml bag for PEDS (has no administration in time range)   azithromycin 500 mg (ZITHROMAX) in 0.9% NaCl 250 mL intermittent infusion 500 mg (has no administration in time range)   sodium chloride 0.9% BOLUS 1,000 mL (1,000 mLs Intravenous $New Bag 9/14/23 1025)      Assessments:  0950 Initial Examination  1152 Recheck patient and discussed results    Independent Interpretation (X-rays, CTs, rhythm strip):  Multifocal infiltrates on CT CAP    Consultations/Discussion of Management or Tests:  Dr. Monroe, hospitalist    Disposition:  The patient was admitted to the hospital under the care of Dr. Monroe.     Impression & Plan        Medical Decision Making:  Inez Collier is a 89 year old female with history of CKD, not pursuing dialysis, chronic anemia, who presents for evaluation of vomiting, diarrhea, confusion, and incidental hypoxia.  Global presentation appears to be most consistent with likely vomiting episode leading to aspiration pneumonitis with subsequent related hypoxia.  Work-up  shows no evidence of sepsis at this time, however there is acute on chronic exacerbation of kidney disease, likely related to hypoxia and soft blood pressures, as well as mild hypokalemia likely related to poor p.o. intake and diarrhea.  Initial blood pressures were in the systolic 100s range but improved with fluids consistent with dehydration.  No evidence of sepsis.  There is slight acute on chronic kidney disease without hyperkalemia.  Patient will be admitted for treatment of CAP/aspiration pneumonia and supportive cares.    Diagnosis:    ICD-10-CM    1. Aspiration pneumonitis (H)  J69.0       2. Acute hypoxemic respiratory failure (H)  J96.01       3. Acute kidney injury superimposed on CKD (H)  N17.9     N18.9       4. Hypokalemia  E87.6       5. Vomiting and diarrhea  R11.10     R19.7       6. Chronic anemia  D64.9               Scribe Disclosure:  I, Tate Faith, am serving as a scribe at 9:53 AM on 9/14/2023 to document services personally performed by Michael Salmeron MD based on my observations and the provider's statements to me.     9/14/2023   Michael Salmeron MD Houghland, John Eric, MD  09/14/23 0491

## 2023-09-14 NOTE — ED TRIAGE NOTES
Triage Assessment       Row Name 09/14/23 0936       Triage Assessment (Adult)    Airway WDL WDL       Respiratory WDL    Respiratory WDL cough  HYPOXIC ON RA    Cough Frequency infrequent    Cough Type dry       Skin Circulation/Temperature WDL    Skin Circulation/Temperature WDL WDL       Peripheral/Neurovascular WDL    Peripheral Neurovascular WDL WDL       Cognitive/Neuro/Behavioral WDL    Cognitive/Neuro/Behavioral WDL WDL

## 2023-09-14 NOTE — ED TRIAGE NOTES
Pt arrives via amb from assisted living. Family and staff reports pt has been increasingly confused. Spo2 on ems arrival 70's on RA. Per ems confusion improved with O2. Pt also reports multiple emesis last night.      Triage Assessment       Row Name 09/14/23 7479       Triage Assessment (Adult)    Airway WDL WDL       Respiratory WDL    Respiratory WDL cough  HYPOXIC ON RA    Cough Frequency infrequent    Cough Type dry       Skin Circulation/Temperature WDL    Skin Circulation/Temperature WDL WDL       Peripheral/Neurovascular WDL    Peripheral Neurovascular WDL WDL       Cognitive/Neuro/Behavioral WDL    Cognitive/Neuro/Behavioral WDL WDL

## 2023-09-14 NOTE — H&P
History and Physical     Inez Collier MRN# [id number]   YOB: 1932 Age: 89 year old      Date of Admission:  9/14/2023    Primary care provider: [facility]          Assessment and Plan:     Summary of Stay: Inez Collier is a 89 year old female with a history of  htn/hlp, hx of TIA and left carotid artery stenosis, infrarenal AAA, CKD stage 5 with creat 3.2-3.7 range, generalized anxiety d/o, COPD not on home oxygen, admitted on 9/14/2023 with n/v, generalized weakness complicated by pna    She notes 7-10 days of feeling poorly, she's been having periodic lightheadedness mostly like she is dizzy but had not had any n/v with it over the past 6 weeks or so.  Over the past few days she's been having n/v with occasional diarrhea with some blood clots.   She denies any fevers/chills.  A daughter at a family reunion had similar sx about 10 days ago.  She had had a junky but non productive cough for the past few days as well.  She denies any aspiration event.  She's been trying to drink fluids but often it just comes right back up.  She's also seemed a bit confused over the past few days as well     In the ER:  VS notable for mild hypoxia 88 % necessitating low level oxygen support, was initially hypotensive which responded to 1 L NS bolus  CMP with bun/creat 64/4.75 , K is low at 2.9  AG 17 with bicarb 26, normal lactate  CBC wbc 24 with lymphopenia and neutrophilia    Sars covid/flu/rsv negative   BCx obtained  Trop 74, EKG NSR with RBBB-no ischemic findings and no change from previous per my personal read  She denies any chest pain     CT CAP with Left UL/LL pna, incidentally noted nodule in the RUL 1.4 cm     I am asked to admit her for CAP complicated by mild hypoxic respiratory failure, with recent sx suggestive of a viral GE, now with recurrent BRBPR, further complicated by CHRIS on CKD      Problem List:   Pneumonia  Hypoxic respiratory failure  Sounds like she's been coughing a bit but not tons.   She clearly denies any aspiration event with the n/v.   ? CAP        *ceftriaxone and azith for now       *BCx obtained   She has mildly low oxygen levels that are asx.  Could just be related to underlying COPD/age       *monitor, goal sats > 88 % for now, consider lowering if remains asx           Suspected viral gastroenteritis   Ck enteric panel    Bloody stools  Chronic normocytic anemia  Apparently seen in the ER 9/7 for same. At that time her hgb was stable, VSS, and she opted to discharge home   She is chronically anemic in the 8-9 range.  Hgb here is 9.4 but I suspect some degree of hemoconcentration   -hold pta baby asa, she's on no other blood thinners  -serial hgb  -blood consent signed and on chart should hgb drop to < 7.0    CHRIS on CKD  Baseline bun/creat 50's/3.2-3.7, currently at 65/4.75  I think due to dehydration/prerenal azotemia.  I do not think that FeNa/Urine studies are going to be helpful given the degree of her kidney dysfunction.    -hold chlorthalidone  -rec'd 1 L of IVF in the ER, will give another over 10 hours       *monitor I/Os and weights       *bladder scan to ensure no retention at play  -hoping for some reversibility here    Elevated troponin  In the past her trops have all been undetectable or wnl (including high sensitivity trop)  Trop here is 74 and EKG is without ischemic findings  Her last cardiac evaluation was in 2013 with negative lexiscan in 2013  I suspect T2MI in setting of acute infection/dehydration/transient hypotension/mild hypoxia  -serial trops, monitor on tele, check echo    AGMA  LA and ketones wnl  ? Due to uremia?  Bicarb looks CO2 looks good at 26    Vertiginous type sx  Sounds acute on chronic  -monitor for now    Htn/hlp  Hx of TIA/Left carotic artery stenosis  Infrarenal AAA  Pta on chlorthalidone 25 mg every day, carvedilol 25 mg bid, amlodipine 10 mg every day, atorvastatin 40 mg every day  -resumed carvedilol and amlodipine with staggered parameters  favoring BB>CCB  -hold diuretic in setting of CHRIS   -cont statin     JUAN/Gerd  Resumed pta sertraline/omeprazole    Incidental RUL nodule  I did not discuss this with the patient or the son       *rads recs are for repeat CT scan in 3 months.  Based on her age could consider no follow up     COVID 19   S/p 5 shot pfizer series 3/2023 (biv x 2)    DVT Prophylaxis: Pneumatic Compression Devices  Code Status: Full Code-She had been DNR/I in the past, and her paperwork supports that as well. When I went to confirm it she stated that she would want to be resuscitated but not left on machines if she wasn't going to have a meaningful recovery.  I discussed with them that her kidney's would certainly fail in that scenario and she again confirmed that she does not want HD.  I told them that I didn't think she would recover from a cardiac arrest and encouraged them to change her back to DNR   Functional Status: She lives in her own apt at [facility] and ambulates independently   Parson: bladder scan to assess for retention   Access:   PIV              Time spent 75 minutes reviewing epic including notes/labs/prior hx, current medications.  In addition to interviewing and examining the patient, updated patient and family regarding plan of care          Chief Complaint:     Generalized weakness, n/v, bloody stools        History of Present Illness:   Inez Collier is a 89 year old female with a history of  htn/hlp, hx of TIA and left carotid artery stenosis, infrarenal AAA, CKD stage 5 with creat 3.2-3.7 range, generalized anxiety d/o, COPD not on home oxygen, admitted on 9/14/2023 with n/v, generalized weakness complicated by pna    She notes 7-10 days of feeling poorly, she's been having periodic lightheadedness mostly like she is dizzy but had not had any n/v with it over the past 6 weeks or so.  Over the past few days she's been having n/v with occasional diarrhea with some blood clots.   She denies any fevers/chills.  A  daughter at a family reunion had similar sx about 10 days ago.  She had had a junky but non productive cough for the past few days as well.  She denies any aspiration event.  She's been trying to drink fluids but often it just comes right back up.  She's also seemed a bit confused over the past few days as well     In the ER:  VS notable for mild hypoxia 88 % necessitating low level oxygen support, was initially hypotensive which responded to 1 L NS bolus  CMP with bun/creat 64/4.75 , K is low at 2.9  AG 17 with bicarb 26, normal lactate  CBC wbc 24 with lymphopenia and neutrophilia    Sars covid/flu/rsv negative   BCx obtained  Trop 74, EKG NSR with RBBB-no ischemic findings and no change from previous per my personal read  She denies any chest pain     CT CAP with Left UL/LL pna, incidentally noted nodule in the RUL 1.4 cm     I am asked to admit her for CAP complicated by mild hypoxic respiratory failure, with recent sx suggestive of a viral GE, now with recurrent BRBPR, further complicated by CHRIS on CKD      The history is obtained in discussion with the ER provider Dr Salmeron, the patient with fair recollection, and her 2 sons at the bedside       Epic and Care everywhere were extensively reviewed        Past Medical History:     Past Medical History:   Diagnosis Date    Asthma     Chronic kidney disease     Colitis     COPD (chronic obstructive pulmonary disease) (H)     Essential hypertension     JUAN (generalized anxiety disorder)     Gastroesophageal reflux disease without esophagitis     Hyperlipidemia LDL goal <100     TIA (transient ischemic attack)              Past Surgical History:     Past Surgical History:   Procedure Laterality Date    GYN SURGERY      ectopic pregnancy surgery    HYSTERECTOMY      TUBAL LIGATION               Social History:     Social History     Tobacco Use    Smoking status: Former     Types: Cigarettes     Quit date: 1972     Years since quittin.7    Smokeless  tobacco: Not on file   Substance Use Topics    Alcohol use: No             Family History:     Family History   Problem Relation Age of Onset    Coronary Artery Disease Mother     Coronary Artery Disease Father     Breast Cancer Sister     Colon Cancer Sister     Coronary Artery Disease Brother     Colon Cancer Brother             Allergies:     Allergies   Allergen Reactions    Lisinopril     Morphine Hcl              Medications:     Prior to Admission medications    Medication Sig Last Dose Taking? Auth Provider Long Term End Date   albuterol (PROAIR HFA/PROVENTIL HFA/VENTOLIN HFA) 108 (90 Base) MCG/ACT inhaler Inhale 2 puffs into the lungs every 6 hours prn at prn Yes Unknown, Entered By History Yes    albuterol (PROVENTIL) (2.5 MG/3ML) 0.083% neb solution Take 1 vial (2.5 mg) by nebulization every 6 hours as needed for shortness of breath / dyspnea or wheezing prn at prn Yes Kwadwo Kimball MD Yes    amLODIPine (NORVASC) 10 MG tablet Take 10 mg by mouth every morning 9/13/2023 at am Yes Unknown, Entered By History No    aspirin (ASA) 81 MG tablet Take 81 mg by mouth daily 9/13/2023 at am Yes Unknown, Entered By History No    atorvastatin (LIPITOR) 40 MG tablet Take 40 mg by mouth daily 9/13/2023 at pm Yes Unknown, Entered By History No    Calcium Citrate-Vitamin D (CALCIUM CITRATE + D3) 200-250 MG-UNIT TABS Take 2 tablets by mouth 2 times daily 9/13/2023 at x2 Yes Unknown, Entered By History     carvedilol (COREG) 25 MG tablet Take 1 tablet (25 mg) by mouth 2 times daily (with meals) 9/13/2023 at x2 Yes Wellington Pepper MD Yes    chlorthalidone (HYGROTON) 25 MG tablet Take 25 mg by mouth daily 9/13/2023 at am Yes Unknown, Entered By History Yes    ferrous sulfate (FEROSUL) 325 (65 Fe) MG tablet Take 325 mg by mouth daily (with breakfast) 9/13/2023 at am Yes Unknown, Entered By History     fluticasone (ARNUITY ELLIPTA) 200 MCG/ACT inhaler Inhale 1 puff into the lungs daily 9/14/2023 at am Yes Unknown, Entered  By History     gabapentin (NEURONTIN) 100 MG capsule Take 100 mg by mouth 2 times daily 9/13/2023 at x2 Yes Reported, Patient Yes    glucosamine-chondroitin 500-400 MG CAPS Take 3 capsules by mouth every morning 9/13/2023 at am Yes Unknown, Entered By History Yes    ketoconazole (NIZORAL) 2 % external shampoo Apply topically daily as needed for itching or irritation prn at prn Yes Unknown, Entered By History     omeprazole (PRILOSEC) 20 MG DR capsule Take 20 mg by mouth daily 9/13/2023 at am Yes Unknown, Entered By History     ondansetron (ZOFRAN-ODT) 4 MG ODT tab Take 1 tablet (4 mg) by mouth every 8 hours as needed for nausea prn at prn Yes Kwadwo Kimball MD     sertraline (ZOLOFT) 100 MG tablet Take 150 mg by mouth every morning 9/13/2023 at am Yes Reported, Patient Yes                  Review of Systems:     A Comprehensive greater than 10 system review of systems was carried out.  Pertinent positives and negatives are noted above.  Otherwise negative for contributory information.           Physical Exam:   Blood pressure 135/68, pulse 67, temperature 98.4  F (36.9  C), temperature source Oral, resp. rate 12, weight 52.2 kg (115 lb), SpO2 94 %.  Exam:    General:  Pleasant nad looks stated age  HEENT:  Head nc/at sclera clear PERRL O/P:  Moist mucus membranes no posterior pharyngeal erythema or exudate.  Neck is supple  Lungs: fine crackles in the LLL, rales in the ZITA, otherwise ctab no wheezing, normal effort   CV:  RRR no m/r/g no le edema  Abd:  S/nt/nd no r/g  Neuro:  Cn 2-12 grossly intact and thompson  Alert and oriented affect appropriate   Skin:  W/d no c/c               Data:     Results for orders placed or performed during the hospital encounter of 09/14/23   CT Chest Abdomen Pelvis w/o Contrast     Status: None    Narrative    CT CHEST ABDOMEN PELVIS W/O CONTRAST 9/14/2023 10:59 AM    CLINICAL HISTORY: abd pain, vomiting, hypoxia, eval for acute abd  process and aspiration pna    TECHNIQUE: CT scan  of the chest, abdomen, and pelvis was performed  without IV contrast. Multiplanar reformats were obtained. Dose  reduction techniques were used.   CONTRAST: None.    COMPARISON: CT exams 9/7/2023, 4/22/2022 and 11/9/2019    FINDINGS:   LUNGS AND PLEURA: The central airways are clear. Moderate patchy  consolidative and groundglass airspace opacities within the left upper  and left lower lobes. Scattered mild patchy ground glass opacities in  the right upper lobe. Minimal right middle and right lower lobe  groundglass opacities. 1.4 cm right upper lobe solid opacity image 77  series 4, new since the prior CT. No pleural effusion.    MEDIASTINUM/AXILLAE: Enlarged heterogeneous thyroid with multiple  nodules with the largest measuring 1.6 cm, stable since the prior  exam. Stable mildly enlarged precarinal lymph node measuring 1.2 cm  short axis, likely reactive. Normal heart size. Trace pericardial  effusion. Tiny hiatal hernia.    CORONARY ARTERY CALCIFICATION: Moderate to severe    HEPATOBILIARY: Normal.    PANCREAS: Normal.    SPLEEN: Normal.    ADRENAL GLANDS: Normal.    KIDNEYS/BLADDER: Symmetric mild bilateral renal atrophy. Multiple  bilateral renal cysts. No follow-up is indicated. No hydronephrosis or  hydroureter. Normal bladder.    BOWEL: Normal caliber small bowel without inflammatory changes.  Nonvisualized appendix. Distal colonic diverticulosis without evidence  of acute diverticulitis. No free air or free fluid.    LYMPH NODES: Normal.    VASCULATURE: Moderate aortoiliac atherosclerosis. Tortuous aneurysmal  abdominal aorta measuring up to 3.1 cm in diameter.    PELVIC ORGANS: Hysterectomy. No pelvic free fluid.    MUSCULOSKELETAL: Osseous demineralization. Spinal degenerative  changes.      Impression    IMPRESSION:  1.  Moderate left upper and left lower lobe airspace disease and  minimal right upper lobe infiltrates consistent with pneumonia.  2.  Right upper lobe are 1.4 cm opacity which may be  related to  pneumonia. Recommend 3 month CT chest follow-up.  3.  No acute findings in the abdomen or pelvis. No inflammatory  process, hydronephrosis or bowel obstruction.    NANCY BREWSTER MD         SYSTEM ID:  [id number]   [facility] Draw     Status: None    Narrative    The following orders were created for panel order [facility] Draw.  Procedure                               Abnormality         Status                     ---------                               -----------         ------                     Extra Blue Top Tube[[id number]]                              Final result               Extra Red Top Tube[[id number]]                               Final result               Extra Green Top (Lithium...[[id number]]                      Final result               Extra Purple Top Tube[[id number]]                            Final result               Extra Heparinized Syringe[[id number]]                        Final result                 Please view results for these tests on the individual orders.   Extra Blue Top Tube     Status: None   Result Value Ref Range    Hold Specimen JIC    Extra Red Top Tube     Status: None   Result Value Ref Range    Hold Specimen JIC    Extra Green Top (Lithium Heparin) Tube     Status: None   Result Value Ref Range    Hold Specimen JIC    Extra Purple Top Tube     Status: None   Result Value Ref Range    Hold Specimen JIC    Extra Heparinized Syringe     Status: None   Result Value Ref Range    Hold Specimen RECEIVED.    Symptomatic Influenza A/B, RSV, & SARS-CoV2 PCR (COVID-19) Nasopharyngeal     Status: Normal    Specimen: Nasopharyngeal; Swab   Result Value Ref Range    Influenza A PCR Negative Negative    Influenza B PCR Negative Negative    RSV PCR Negative Negative    SARS CoV2 PCR Negative Negative    Narrative    Testing was performed using the Xpert Xpress CoV2/Flu/RSV Assay on the Cepheid GeneXpert Instrument. This test should be ordered for the detection of SARS-CoV-2,  influenza, and RSV viruses in individuals who meet clinical and/or epidemiological criteria. Test performance is unknown in asymptomatic patients. This test is for in vitro diagnostic use under the FDA EUA for laboratories certified under CLIA to perform high or moderate complexity testing. This test has not been FDA cleared or approved. A negative result does not rule out the presence of PCR inhibitors in the specimen or target RNA in concentration below the limit of detection for the assay. If only one viral target is positive but coinfection with multiple targets is suspected, the sample should be re-tested with another FDA cleared, approved, or authorized test, if coinfection would change clinical management. This test was validated by the [facility]. These laboratories are certified under the Clinical Laboratory Improvement Amendments of 1988 (CLIA-88) as qualified to perform high complexity laboratory testing.   Comprehensive metabolic panel     Status: Abnormal   Result Value Ref Range    Sodium 136 136 - 145 mmol/L    Potassium 2.9 (L) 3.4 - 5.3 mmol/L    Chloride 93 (L) 98 - 107 mmol/L    Carbon Dioxide (CO2) 26 22 - 29 mmol/L    Anion Gap 17 (H) 7 - 15 mmol/L    Urea Nitrogen 64.4 (H) 8.0 - 23.0 mg/dL    Creatinine 4.75 (H) 0.51 - 0.95 mg/dL    Calcium 9.5 8.2 - 9.6 mg/dL    Glucose 118 (H) 70 - 99 mg/dL    Alkaline Phosphatase 67 35 - 104 U/L    AST 32 0 - 45 U/L    ALT 12 0 - 50 U/L    Protein Total 7.4 6.4 - 8.3 g/dL    Albumin 4.5 3.5 - 5.2 g/dL    Bilirubin Total 0.3 <=1.2 mg/dL    GFR Estimate 8 (L) >60 mL/min/1.73m2   Lactic acid whole blood     Status: Normal   Result Value Ref Range    Lactic Acid 0.7 0.7 - 2.0 mmol/L   Troponin T, High Sensitivity (now)     Status: Abnormal   Result Value Ref Range    Troponin T, High Sensitivity 74 (H) <=14 ng/L   CBC with platelets and differential     Status: Abnormal   Result Value Ref Range    WBC Count 23.6 (H) 4.0 - 11.0 10e3/uL    RBC  Count 3.23 (L) 3.80 - 5.20 10e6/uL    Hemoglobin 9.4 (L) 11.7 - 15.7 g/dL    Hematocrit 27.7 (L) 35.0 - 47.0 %    MCV 86 78 - 100 fL    MCH 29.1 26.5 - 33.0 pg    MCHC 33.9 31.5 - 36.5 g/dL    RDW 12.9 10.0 - 15.0 %    Platelet Count 245 150 - 450 10e3/uL    % Neutrophils 92 %    % Lymphocytes 3 %    % Monocytes 4 %    % Eosinophils 0 %    % Basophils 0 %    % Immature Granulocytes 1 %    NRBCs per 100 WBC 0 <1 /100    Absolute Neutrophils 21.9 (H) 1.6 - 8.3 10e3/uL    Absolute Lymphocytes 0.7 (L) 0.8 - 5.3 10e3/uL    Absolute Monocytes 0.9 0.0 - 1.3 10e3/uL    Absolute Eosinophils 0.0 0.0 - 0.7 10e3/uL    Absolute Basophils 0.1 0.0 - 0.2 10e3/uL    Absolute Immature Granulocytes 0.1 <=0.4 10e3/uL    Absolute NRBCs 0.0 10e3/uL   Ketone Beta-Hydroxybutyrate Quantitative     Status: Normal   Result Value Ref Range    Ketone (Beta-Hydroxybutyrate) Quantitative 0.30 <=0.30 mmol/L   EKG 12 lead     Status: None (Preliminary result)   Result Value Ref Range    Systolic Blood Pressure  mmHg    Diastolic Blood Pressure  mmHg    Ventricular Rate 67 BPM    Atrial Rate 67 BPM    NC Interval 180 ms    QRS Duration 142 ms     ms    QTc 509 ms    P Axis 23 degrees    R AXIS -6 degrees    T Axis -17 degrees    Interpretation ECG       Sinus rhythm  Right bundle branch block  Minimal voltage criteria for LVH, may be normal variant  T wave abnormality, consider lateral ischemia  Abnormal ECG  When compared with ECG of 22-APR-2022 16:34,  No significant change was found     CBC + differential     Status: Abnormal    Narrative    The following orders were created for panel order CBC + differential.  Procedure                               Abnormality         Status                     ---------                               -----------         ------                     CBC with platelets and d...[[id number]]  Abnormal            Final result                 Please view results for these tests on the individual orders.

## 2023-09-14 NOTE — ED NOTES
[facility]  ED Nurse Handoff Report    ED Chief complaint: Shortness of Breath  . ED Diagnosis:   Final diagnoses:   Aspiration pneumonitis (H)   Acute hypoxemic respiratory failure (H)   Acute kidney injury superimposed on CKD (H)   Hypokalemia   Vomiting and diarrhea   Chronic anemia       Allergies:   Allergies   Allergen Reactions    Lisinopril     Morphine Hcl        Code Status: Full Code    Activity level - Baseline/Home:  independent.  Activity Level - Current:   assist of 1.   Lift room needed: No.   Bariatric: No   Needed: No   Isolation: No.   Infection: Not Applicable.     Respiratory status: Nasal cannula, 2L.    Vital Signs (within 30 minutes):   Vitals:    09/14/23 1400 09/14/23 1415 09/14/23 1430 09/14/23 1445   BP:  139/70 (!) 117/100 135/68   Pulse: 70 71 72 67   Resp: 14 23 19 12   Temp:       TempSrc:       SpO2: 94% 91% 92% 94%   Weight:           Cardiac Rhythm:  ,      Pain level:    Patient confused: No.   Patient Falls Risk: nonskid shoes/slippers when out of bed, arm band in place, patient and family education, assistive device/personal items within reach, and activity supervised.   Elimination Status: Has voided     Patient Report - Initial Complaint: SOB.   Focused Assessment:      Respiratory (Adult)Airway WDL: WDL  Respiratory WDLRespiratory WDL: .WDL except; rhythm/patternRhythm/Pattern, Respiratory: shortness of breath; dyspnea on exertionCough Frequency: infrequentCough Type: dry    Abnormal Results:   Labs Ordered and Resulted from Time of ED Arrival to Time of ED Departure   COMPREHENSIVE METABOLIC PANEL - Abnormal       Result Value    Sodium 136      Potassium 2.9 (*)     Chloride 93 (*)     Carbon Dioxide (CO2) 26      Anion Gap 17 (*)     Urea Nitrogen 64.4 (*)     Creatinine 4.75 (*)     Calcium 9.5      Glucose 118 (*)     Alkaline Phosphatase 67      AST 32      ALT 12      Protein Total 7.4      Albumin 4.5      Bilirubin Total 0.3      GFR  Estimate 8 (*)    TROPONIN T, HIGH SENSITIVITY - Abnormal    Troponin T, High Sensitivity 74 (*)    CBC WITH PLATELETS AND DIFFERENTIAL - Abnormal    WBC Count 23.6 (*)     RBC Count 3.23 (*)     Hemoglobin 9.4 (*)     Hematocrit 27.7 (*)     MCV 86      MCH 29.1      MCHC 33.9      RDW 12.9      Platelet Count 245      % Neutrophils 92      % Lymphocytes 3      % Monocytes 4      % Eosinophils 0      % Basophils 0      % Immature Granulocytes 1      NRBCs per 100 WBC 0      Absolute Neutrophils 21.9 (*)     Absolute Lymphocytes 0.7 (*)     Absolute Monocytes 0.9      Absolute Eosinophils 0.0      Absolute Basophils 0.1      Absolute Immature Granulocytes 0.1      Absolute NRBCs 0.0     INFLUENZA A/B, RSV, & SARS-COV2 PCR - Normal    Influenza A PCR Negative      Influenza B PCR Negative      RSV PCR Negative      SARS CoV2 PCR Negative     LACTIC ACID WHOLE BLOOD - Normal    Lactic Acid 0.7     BLOOD CULTURE   BLOOD CULTURE        CT Chest Abdomen Pelvis w/o Contrast   Final Result   IMPRESSION:   1.  Moderate left upper and left lower lobe airspace disease and   minimal right upper lobe infiltrates consistent with pneumonia.   2.  Right upper lobe are 1.4 cm opacity which may be related to   pneumonia. Recommend 3 month CT chest follow-up.   3.  No acute findings in the abdomen or pelvis. No inflammatory   process, hydronephrosis or bowel obstruction.      NANCY BREWSTER MD            SYSTEM ID:  [id number]          Treatments provided: Iv abx, IVFB.  Family Comments: Sons at bedside, supportive.  OBS brochure/video discussed/provided to patient:  N/A  ED Medications:   Medications   sodium chloride 0.9% BOLUS 1,000 mL (1,000 mLs Intravenous $New Bag 9/14/23 1025)   cefTRIAXone (ROCEPHIN) 2 g vial to attach to  ml bag for ADULTS or NS 50 ml bag for PEDS (0 g Intravenous Stopped 9/14/23 1244)   azithromycin 500 mg (ZITHROMAX) in 0.9% NaCl 250 mL intermittent infusion 500 mg (500 mg Intravenous $New Bag  9/14/23 1245)       Drips infusing:  No  For the majority of the shift this patient was Green.   Interventions performed were NA.    Sepsis treatment initiated: No    Cares/treatment/interventions/medications to be completed following ED care: Admit orders.    ED Nurse Name: Chato Bruno RN  3:14 PM    .RECEIVING UNIT ED HANDOFF REVIEW    Above ED Nurse Handoff Report was reviewed: Yes  Reviewed by: Katie Ríos RN on September 14, 2023 at 8:50 PM

## 2023-09-15 NOTE — PROGRESS NOTES
09/15/23 1100   Appointment Info   Signing Clinician's Name / Credentials (PT) Kimmy Rodgers DPT   Living Environment   People in Home facility resident   Current Living Arrangements assisted living   Self-Care   Activity/Exercise/Self-Care Comment Pt reports does own showering, dressing, light meal prep. Pt has help with 2 meals per day, laundry, has med packs but does take on her own   General Information   Onset of Illness/Injury or Date of Surgery 09/14/23   Referring Physician Pavithra Monroe MD   Patient/Family Therapy Goals Statement (PT) Pt hopeful to DC home. Family considering hospice   Pertinent History of Current Problem (include personal factors and/or comorbidities that impact the POC) Inez Collier is a 89 year old female with a history of  htn/hlp, hx of TIA and left carotid artery stenosis, infrarenal AAA, CKD stage 5 with creat 3.2-3.7 range, generalized anxiety d/o, COPD not on home oxygen, admitted on 9/14/2023 with n/v, generalized weakness complicated by pna   Existing Precautions/Restrictions oxygen therapy device and L/min  (new for pt, on 2 liters, RA at home)   Cognition   Affect/Mental Status (Cognition) confused   Orientation Status (Cognition) person   Follows Commands (Cognition) follows one-step commands   Safety Deficit (Cognition) problem-solving   Memory Deficit (Cognition) short-term memory   Cognitive Status Comments Recommend further cognition screening will page MD   Range of Motion (ROM)   Range of Motion ROM is WFL   Strength (Manual Muscle Testing)   Strength (Manual Muscle Testing) strength is WFL   Bed Mobility   Comment, (Bed Mobility) inde   Transfers   Comment, (Transfers) inde   Gait/Stairs (Locomotion)   Comment, (Gait/Stairs) veering and missteps without AD, on 2liters of O2   Balance   Balance Comments veering with ambulation   Clinical Impression   Criteria for Skilled Therapeutic Intervention Yes, treatment indicated   PT Diagnosis (PT) decreased balance, O2  demands   Influenced by the following impairments poor balance, need for O2, cognition   Functional limitations due to impairments decreased CV response, decreased safe ambulation without AD   Clinical Presentation (PT Evaluation Complexity) Stable/Uncomplicated   Clinical Presentation Rationale likely medically stable for DC tomorrow per MD   Clinical Decision Making (Complexity) low complexity   Planned Therapy Interventions (PT) progressive activity/exercise;patient/family education   Anticipated Equipment Needs at Discharge (PT)   (full time use of her 4WW)   Risk & Benefits of therapy have been explained evaluation/treatment results reviewed;care plan/treatment goals reviewed;risks/benefits reviewed;current/potential barriers reviewed;participants voiced agreement with care plan;participants included;patient   PT Total Evaluation Time   PT Eval, Low Complexity Minutes (84822) 5   Physical Therapy Goals   PT Frequency One time eval and treatment only   PT Predicted Duration/Target Date for Goal Attainment 09/15/23   PT Goals Gait;PT Goal 1   PT: Gait Modified independent;Greater than 200 feet   PT: Goal 1 Pt will be educated on safety wtih O2 line for safe mobility in home   Interventions   Interventions Quick Adds Therapeutic Activity   Therapeutic Activity   Therapeutic Activities: dynamic activities to improve functional performance Minutes (27514) 20   Symptoms Noted During/After Treatment Shortness of breath   Treatment Detail/Skilled Intervention Pt was cued for use of 4WW with 2 liters of O2. Pt was educated full time use of 4WW. Pt was educated on safety concerns with O2 line, to always be aware of line. Pt tolerating 200 feet of ambulation with 2 liters with 4WW, no LOB. Pt sats 92% with this activity.   PT Discharge Planning   PT Plan DC   PT Discharge Recommendation (DC Rec) home with assist   PT Rationale for DC Rec Defer to MD re: OT/cognition screening but does have cognition deficits. Pt doing  well from PT perspective recommend full time use of 4WW. Concern if remembers to use 4WW and O2. Recommend increased supervision in home, again defer to OT for amount of supervision.   PT Brief overview of current status 200 feet with 4WW, on 2 liters   Total Session Time   Timed Code Treatment Minutes 20   Total Session Time (sum of timed and untimed services) 25

## 2023-09-15 NOTE — CONSULTS
Care Management Initial Consult    General Information  Assessment completed with: Patient, Care Team Member, -chart review, Children,    Type of CM/SW Visit: Initial Assessment    Primary Care Provider verified and updated as needed: Yes   Readmission within the last 30 days: no previous admission in last 30 days      Reason for Consult: discharge planning  Advance Care Planning:            Communication Assessment  Patient's communication style: spoken language (English or Bilingual)    Hearing Difficulty or Deaf: no   Wear Glasses or Blind: yes    Cognitive  Cognitive/Neuro/Behavioral: .WDL except, all  Level of Consciousness: confused, alert  Arousal Level: opens eyes spontaneously  Orientation: disoriented to, time  Mood/Behavior: calm, cooperative  Best Language: 0 - No aphasia  Speech: clear, spontaneous    Living Environment:   People in home: alone     Current living Arrangements: assisted living  Name of Facility: [facility]   Able to return to prior arrangements: yes       Family/Social Support:  Care provided by: self  Provides care for: no one  Marital Status:   Facility resident(s)/Staff, Children          Description of Support System: Supportive, Involved    Support Assessment: Adequate family and caregiver support    Current Resources:   Patient receiving home care services:       Community Resources:    Equipment currently used at home: grab bar, toilet, grab bar, tub/shower, shower chair  Supplies currently used at home:      Employment/Financial:  Employment Status:          Financial Concerns:             Does the patient's insurance plan have a 3 day qualifying hospital stay waiver?  No    Lifestyle & Psychosocial Needs:  Social Determinants of Health     Tobacco Use: Medium Risk (9/14/2023)    Patient History     Smoking Tobacco Use: Former     Smokeless Tobacco Use: Unknown     Passive Exposure: Not on file   Alcohol Use: Not on file   Financial Resource Strain: Not on file   Food  Insecurity: Not on file   Transportation Needs: Not on file   Physical Activity: Not on file   Stress: Not on file   Social Connections: Not on file   Intimate Partner Violence: Not on file   Depression: Not at risk (1/12/2019)    PHQ-2     PHQ-2 Score: 0   Housing Stability: Not on file       Functional Status:  Prior to admission patient needed assistance:   Dependent ADLs:: Independent  Dependent IADLs:: Meal Preparation, Medication Management    Additional Information:  SW assessed for discharge planning.  SW met with pt and son. Pt lives at [facility] in [place].  Pt is independent with IADLS/ADLs, except getting meals at [facility] and family sets up medications for pt.  Pt has been walking independently.      Prior to SW meeting with pt, son and pt met with provider and talked about discharge planning. Pt may go home on hospice. Currently pt has PCP through [facility], but is interested in getting medical care through [facility] providers and hospice.  Son is going to talk to [facility] about options.  They approved Swer to call [facility] to work on discharge planning.    SW talked to JS Segura at [facility], [phone], and confirmed meals. They use [facility] for hospice and home care (in-house).  Send order to [facility] and they will provide order to [facility]. [facility] fax, [phone]. RN stated pt can't come home on weekends, prefer by 3 pm.      Informed son and pt [facility] can't take pt on weekends, son stated he can bring his mom home, she doesn't have medical care there.  Son is going to meet with RN shortly and will call  with plan.    BRISEIDA Simmons, [facility]  Inpatient Care Coordination  [facility]  [phone]      Pt's pharmacy, [facility] in [place]

## 2023-09-15 NOTE — CONSULTS
"Pt admitted with \"a history of  htn/hlp, hx of TIA and left carotid artery stenosis, infrarenal AAA, CKD stage 5 with creat 3.2-3.7 range, generalized anxiety d/o, COPD not on home oxygen, admitted on 9/14/2023 with n/v, generalized weakness complicated by pna\". Pt noted to have unplanned readmission risk of 23%.  Care Coordination team is following pt for discharge planning.    BRISEIDA Simmons, [facility]  Inpatient Care Coordination  [facility]  [phone]        "

## 2023-09-15 NOTE — PROGRESS NOTES
[facility]    Medicine Progress Note - Hospitalist Service    Date of Admission:  9/14/2023    Primary Care Physician   Son Terry House  CONSULTANTS: None    Assessment & Plan   Summary of Stay: Inez Collier is a 89 year old female with a history of hypertension, hyperlipidemia, previous TIA and left carotid artery stenosis, infrarenal AAA, CKD stage 5 not on dialysis, generalized anxiety, COPD not on home oxygen, admitted on 9/14/2023 with nausea, vomiting,, generalized weakness complicated by hypoxia with a possible pneumonia.  On admission, she states that she has had  7-10 days of feeling poorly, she's been having periodic lightheadedness mostly like she is dizzy but had not had any 0 vomiting with it over the past 6 weeks or so.  Over the past few days she's been having nausea and vomiting with occasional diarrhea with some blood clots.   She denies any fevers/chills.  A daughter at a family reunion had similar symptoms about 10 days ago.  She had had a junky but non productive cough for the past few days as well.  She denies any aspiration event.  She's been trying to drink fluids but often it just comes right back up.  She's also seemed a bit confused over the past few days as well      In the ER:  VS notable for mild hypoxia 88 % necessitating low level oxygen support, was initially hypotensive which responded to 1 L NS bolus. CMP with bun/creat 64/4.75 , K is low at 2.9. AG 17 with bicarb 26, normal lactate  CBC wbc 24 with lymphopenia and neutrophilia. Sars covid/flu/rsv negative, Trop 74, EKG NSR with RBBB-no ischemic findings and no change from previous       She underwent a CT of the chest showing possible left UL/LL pna, incidentally noted nodule in the RUL 1.4 cm            Problem List:   Acute hypoxic respiratory failure/ pulmonary infiltrate/possible commune acquired pneumonia/COPD  Per H&P, the patient had been coughing a bit but not tons.  She denies current cough to me.   Denies any sputum production well.  Has not had fever, chills, sweats.  Has no chest pain either.  Patient had a CT scan showing possible left-sided pneumonia.  Given her hypoxia she will be treated for pneumonia with antibiotics.  Was started on ceftriaxone which will change to oral Omnicef along with azithromycin.  Complicating the picture however, is her severe renal failure and the fact that she could be volume up.  Her BNP is up to 2500 but again with her renal failure is hard to interpret.  Patient currently is on oxygen and dropped down to the 70s when she got up and about.  I am hesitant to place the patient on any kind of diuretics given her renal failure at this point.  May just need to go home on oxygen.  Goal is to be very conservative in this 89-year-old this was discussed with the patient and her son.  Patient continues on albuterol along with her fluticasone.  Patient currently does not have any wheezing.    Hypokalemia:  potassium down to 2.7.  Will give a dose of K-dur and recheck in  the am.  Nursing severe renal failure I am not planning on putting on replacement protocol given the risks.         Possible viral gastroenteritis   Had a family member that was ill as well.  Check an enteric panel if her diarrhea persist but she has not had any.  Deemed to have resolved.     Bloody stools/Chronic normocytic anemia  Apparently seen in the ER 9/7 for same. At that time her hgb was stable, VSS, and she opted to discharge home   She is chronically anemic in the 8-9 range.  Hgb here is 9.4 but I suspect some degree of hemoconcentration is with fluids her hemoglobin came down to 7.3.  This point holding blood thinners.  Anemia could also be a cause of her worsening kidney failure.  A blood consent signed and on chart should hgb drop to < 7.0.  Really trying to be conservative and prevent the need for transfusion.     CHRIS on chronic kidney disease stage V (not on dialysis)  Baseline bun/creat 50's/3.2-3.7,  on admission was currently at 65/4.75.  Given her age and BUN and uremia is a concern leading to confusion.  Patient likely does have a component of dehydration but also anemia leading to her renal failure.  Her creatinine on recheck is 4.69.  At this point further work-up is not indicated.  Would hold off on any chlorthalidone as is a terrible diuretic to be using in a 89-year-old.  Consider loop diuretic but at this point the patient seems stable.  BNP is elevated at 2500 which would go with some volume overload at this point.  Is not a dialysis candidate and would not wanted.  She understands that she is getting close to dying and may actually benefit from hospice going forward.     Elevated troponin  In the past her trops have all been undetectable or wnl (including high sensitivity trop). Trop here is 74 and EKG is without ischemic findings this is in the setting of stage V kidney disease and is not very helpful.  Patient is not a candidate for any kind of angiogram and would not even check this test in the future as we would not be able to do anything with it.  Given her age and multiple medical problems would focus on her comfort if it came to that.  Patient has no signs or symptoms of acute coronary syndrome.  Patient did have an echocardiogram done, for 89-year-old, her echocardiogram looked good.The visual ejection fraction is 60-65%.  Left ventricular systolic function is normal. There is trace aortic regurgitation.  Trivial pericardial effusion. The rhythm was sinus with wide QRS.  Again given her age and goals of care I would not do any further cardiac testing in the future as it would not change her management.       Evaded anion gap without acidosis  Lactic acid and ketones were normal.  Her anion gap came down to 15 with fluids.  Bicarb is good at 26 which is interesting given her severe renal failure.          Hypertension/history of TIA/left carotid artery stenosis  Given the patient's age our goal  is to do no harm and making sure she does not have symptoms of weakness, dizziness, and falls.  She describes some orthostatic type symptoms when she stands up also has dizziness at other times.  At this point her goal should be to have her blood pressure less than 180.  Would not suggest keeping her on chlorthalidone going forward and I will stop her amlodipine.  We will cut her Coreg dose in half and see what happens with her symptoms.  This will also allow for better cerebral blood flow as she has a history of left carotid artery stenosis and a previous TIA.    Recurrent falls  And admits to multiple falls and some of them have been significant per her report.  She is using a four-wheel walker now.  We need to be very careful about the medications we use that could cause potential side effects contributing to her weakness, dizziness, and falls.  The worsening I can happen this 89 year old for her to fall and break her hip.    Hyperlipidemia  Given the patient's age and multiple medical problems her goal is to do no harm.  She is currently is on a statin and her risk for this medication far outweighs the benefit going forward.  We need to make sure that she is not having any Canna weakness or muscle aches and pains which contribute to falls.  We will be stopping her statin at this point.    History abdominal aortic aneurysm  3.5 cm at this point.  Obviously the patient is not a surgical candidate and would not even follow this going forward.    General anxiety disorder  Patient is on sertraline at home which we will continue    Gastroesophageal reflux disease   Resumed pta sertraline/omeprazole     Incidental RUL nodule  Patient has a 1.4 cm right upper lobe lung nodule.  Given the patient's age she is not a candidate for any further treatment so I would not even follow this up in the future.  This was discussed with the patient and her son.         COVID 19   S/p 5 shot pfizer series 3/2023 (biv x 2)    Advanced  care planning  Had a nice long 45-minute discussion with the patient and her son bedside discussing her goals of care.  Patient understands that she would not do well if she required any Canna of her aerobics and is currently DNR/I.  Patient was confused on admission and was not completely sure of what her wishes were.  At this point were actually talking about hospice.  Given her severe renal failure she would qualify along with her ongoing hypoxic respiratory failure.  This would allow the patient to have extra help in providing comfort measures.  Patient currently resides in assisted living and the family is already looking into memory care at the same facility.  They would like to have a primary care provider see her at home and take over all of her medical needs.  They would also like to have hospice involved as well.  Patient's son will discuss this with the facility to see what their options are.  Marisol does go to the facility per the Internet, and I have recommended that the son reach out to them to see if she would qualify being under their care.  Goal is to prevent side effects and symptoms at this point.  I have filled out a new POLST form which includes comfort.  I have not placed comfort orders yet as the goal is to get her back to her facility and continue current treatments       4Ms:  Polypharmacy: Medications reviewed with the patient and her son, we will be stopping her Norvasc, chlorthalidone, statin, and decreasing her beta-blocker to decrease the risk of side effects including hypotension, weakness, and will certainly falls.  Mentation:  SLUMS N/A,  BIMS N/A,  CPT N/A,  Capacity comes and goes and does not have full capacity make her own decisions by herself but does lean on her family.  Patient was certainly can get confused due to uremia due to her renal failure along with side effects from medications.  Social support: Patient currently lives in assisted living and the plan is to get her  to memory care at the same facility.  Patient has 4 children 2 sons and 2 daughters with one of her daughters living in [place] otherwise they are local.  Mobility: Patient gets around using a 4 wheeled walker  What is importmant: To be comfortable.  Patient states that she has lived a good life and she has nothing to complain about.  She is open to hospice going forward to making sure that she remains comfortable.      Discussed plan of care with bedside nursing, social work, case management, and son bedside      Diet: Combination Diet Regular Diet Adult; Mechanical/Dental Soft Diet    DVT Prophylaxis: Pneumatic Compression Devices  Parson Catheter: Not present  Lines: None     Cardiac Monitoring: ACTIVE order. Indication: Chest pain/ ACS rule out (24 hours)    RESTRAINTS: Not indicated  Code Status: No CPR- Do NOT Intubate        Follow up plan: Patient's goal is to get to back to her assisted living with or without oxygen depending on if it is needed and have a provider follow her at the facility so she does not have to go back to clinic.  Also will have hospice as discussed for home.  Family is good to be moving her into memory care at the same facility in the near future.     This document was created using voice recognition technology.  Please excuse any typographical errors that may have occurred.  Please call with any questions.         Clinically Significant Risk Factors Present on Admission        # Hypokalemia: Lowest K = 2.7 mmol/L in last 2 days, will replace as needed   # Hypocalcemia: Lowest Ca = 8 mg/dL in last 2 days, will monitor and replace as appropriate       # Drug Induced Platelet Defect: home medication list includes an antiplatelet medication                   Disposition Plan      Expected Discharge Date: 09/16/2023      Destination: assisted living            Barrier to discharge: Hypoxia, needed for assisted living to take her on the weekend    Quoc Ken MD  Hospitalist Service  [facility]  Securely message with [employer] (more info)  Text page via [facility] Paging/Directory   ______________________________________________________________________    Interval History   Patient is new to me.  Found her resting comfortably.  She is not in any current distress.  Per nursing the patient was transferred to a chair and with that little activity dropped her saturations down to 70% but came up to the 90s on just 2 L.  She denies any current cough, sputum production, fever, chills, or sweats.  She is actually looking pretty good according to the son.  Her confusion that she had last night seems to be improved.  Patient's kidney functions a little bit better as well.  No further diarrhea.  Does not complain of being nauseated but does have some dyspepsia so would like some Tums.  Patient is wearing oxygen and denies being short of breath.  No ongoing diarrhea currently in the hospital.      ROS: A comprehensive review of systems was negative except for items noted in the HPI.  Patient currently denies any fever, chills, sweats, nausea, vomiting, diarrhea, shortness of breath, or chest pain.  The patient's on and off confusion not sure how accurate her review of systems is    Physical Exam   Vital Signs: Temp: 97  F (36.1  C) Temp src: Oral BP: (!) 140/60 Pulse: 59   Resp: 12 SpO2: 99 % O2 Device: Nasal cannula Oxygen Delivery: 2 LPM  Weight: 113 lbs 0 oz    Exam actually seems little bit better than admission and she is less confused  General appearance: Patient is alert and oriented x2, is a little confused but better than admission, no apparent distress, pleasant and conversing normally, speaking in full sentences, appears stated age and is elderly and frail  HEENT:    Mucous membranes are moist  NECK:  supple without bruit or lymphadenopathy  RESPIRATORY: Few rhonchi in the left but otherwise clear to auscultation bilatera actually sounds better than expected l, good air movement,  wearing a nasal cannula  CARDIOVASCULAR: Regular rate and rhythm, normal S1/S2, no murmurs, rubs, or gallops present, peripheral pulses intact  GASTROINTESTINAL: Thin, non-distended, non-tender, soft, bowel sounds present throughout  NEUROLOGIC:  Cranial nerves II-XII intact, without any focal deficits, strength 5/5 throughout  EXTREMITIES:  Moves all extremities, no clubbing, cyanosis, nor edema  :  Parson not present         Data     I have personally reviewed the following data over the past 24 hrs:    12.9 (H)  \   7.3 (L)   / 175     136 97 (L) 68.5 (H) /  107 (H)   2.7 (L) 24 4.69 (H) \     Trop: 71 (H) BNP: 2,449 (H)     Procal: 3.23 (H) CRP: N/A Lactic Acid: N/A         Imaging:   Results for orders placed or performed during the hospital encounter of 09/14/23   CT Chest Abdomen Pelvis w/o Contrast    Narrative    CT CHEST ABDOMEN PELVIS W/O CONTRAST 9/14/2023 10:59 AM    CLINICAL HISTORY: abd pain, vomiting, hypoxia, eval for acute abd  process and aspiration pna    TECHNIQUE: CT scan of the chest, abdomen, and pelvis was performed  without IV contrast. Multiplanar reformats were obtained. Dose  reduction techniques were used.   CONTRAST: None.    COMPARISON: CT exams 9/7/2023, 4/22/2022 and 11/9/2019    FINDINGS:   LUNGS AND PLEURA: The central airways are clear. Moderate patchy  consolidative and groundglass airspace opacities within the left upper  and left lower lobes. Scattered mild patchy ground glass opacities in  the right upper lobe. Minimal right middle and right lower lobe  groundglass opacities. 1.4 cm right upper lobe solid opacity image 77  series 4, new since the prior CT. No pleural effusion.    MEDIASTINUM/AXILLAE: Enlarged heterogeneous thyroid with multiple  nodules with the largest measuring 1.6 cm, stable since the prior  exam. Stable mildly enlarged precarinal lymph node measuring 1.2 cm  short axis, likely reactive. Normal heart size. Trace pericardial  effusion. Tiny hiatal  hernia.    CORONARY ARTERY CALCIFICATION: Moderate to severe    HEPATOBILIARY: Normal.    PANCREAS: Normal.    SPLEEN: Normal.    ADRENAL GLANDS: Normal.    KIDNEYS/BLADDER: Symmetric mild bilateral renal atrophy. Multiple  bilateral renal cysts. No follow-up is indicated. No hydronephrosis or  hydroureter. Normal bladder.    BOWEL: Normal caliber small bowel without inflammatory changes.  Nonvisualized appendix. Distal colonic diverticulosis without evidence  of acute diverticulitis. No free air or free fluid.    LYMPH NODES: Normal.    VASCULATURE: Moderate aortoiliac atherosclerosis. Tortuous aneurysmal  abdominal aorta measuring up to 3.1 cm in diameter.    PELVIC ORGANS: Hysterectomy. No pelvic free fluid.    MUSCULOSKELETAL: Osseous demineralization. Spinal degenerative  changes.      Impression    IMPRESSION:  1.  Moderate left upper and left lower lobe airspace disease and  minimal right upper lobe infiltrates consistent with pneumonia.  2.  Right upper lobe are 1.4 cm opacity which may be related to  pneumonia. Recommend 3 month CT chest follow-up.  3.  No acute findings in the abdomen or pelvis. No inflammatory  process, hydronephrosis or bowel obstruction.    NANCY BREWSTER MD         SYSTEM ID:  [id number]   Echocardiogram Complete     Value    LVEF  60-65%    Narrative    [id number]  [id number]  [id number]  [id number]^MICKEY^NADIYA     [facility]  Echocardiography Laboratory  [address] [place] [address]     Name: ANDREW RAMON  MRN: [id number]  : 1932  Study Date: 09/15/2023 09:35 AM  Age: 89 yrs  Gender: Female  Patient Location: [facility]  Reason For Study: SOB  Ordering Physician: MICKEY BARKER  Referring Physician: Justice House  Performed By: Melina Dobson     BSA: 1.5 m2  Height: 59 in  Weight: 115 lb  HR: 59  BP: 130/121 mmHg  ______________________________________________________________________________  Procedure  Complete Portable Echo  Adult.  ______________________________________________________________________________  Interpretation Summary     The visual ejection fraction is 60-65%.  Left ventricular systolic function is normal.  There is trace aortic regurgitation.  Trivial pericardial effusion  The rhythm was sinus with wide QRS.  ______________________________________________________________________________  Left Ventricle  The left ventricle is normal in size. There is moderate concentric left  ventricular hypertrophy. Diastolic Doppler findings (E/E' ratio and/or other  parameters) suggest left ventricular filling pressures are indeterminate. The  visual ejection fraction is 60-65%. Left ventricular systolic function is  normal.     Right Ventricle  The right ventricle is normal in size and function.     Atria  The left atrium is moderately dilated. Right atrial size is normal. There is  no color Doppler evidence of an atrial shunt.     Mitral Valve  The mitral valve leaflets are mildly thickened. There is trace mitral  regurgitation.     Tricuspid Valve  There is trace tricuspid regurgitation.     Aortic Valve  The aortic valve is trileaflet. There is trace aortic regurgitation. (Despite  the calculated aortic valve area, the aortic valve opens well in the  parasternal short axis and is consistent with the low mean gradient aceoss the  valve.). The mean AoV pressure gradient is 6.5 mmHg. No hemodynamically  significant valvular aortic stenosis.     Pulmonic Valve  There is trace pulmonic valvular regurgitation. Normal pulmonic valve  velocity.     Vessels  The aortic root is normal size. Normal size ascending aorta. IVC diameter and  respiratory changes fall into an intermediate range suggesting an RA pressure  of 8 mmHg.     Pericardium  Trivial pericardial effusion.     Rhythm  The rhythm was sinus with wide QRS.  ______________________________________________________________________________  MMode/2D Measurements &  Calculations  IVSd: 1.5 cm     LVIDd: 3.9 cm  LVIDs: 2.6 cm  LVPWd: 1.4 cm  FS: 31.7 %  LV mass(C)d: 200.9 grams  LV mass(C)dI: 137.8 grams/m2  Ao root diam: 2.9 cm  asc Aorta Diam: 3.3 cm  LVOT diam: 1.8 cm  LVOT area: 2.6 cm2  Ao root diam index Ht(cm/m): 1.9  Ao root diam index BSA (cm/m2): 2.0  Asc Ao diam index BSA (cm/m2): 2.3  Asc Ao diam index Ht(cm/m): 2.2  LA Volume (BP): 66.2 ml     LA Volume Index (BP): 45.3 ml/m2  RWT: 0.70  TAPSE: 2.3 cm     Doppler Measurements & Calculations  MV E max severo: 110.0 cm/sec  MV A max severo: 132.0 cm/sec  MV E/A: 0.83  MV max P.1 mmHg  MV mean PG: 3.4 mmHg  MV V2 VTI: 50.4 cm  MVA(VTI): 1.6 cm2  MV dec time: 0.26 sec  Ao V2 max: 173.0 cm/sec  Ao max P.0 mmHg  Ao V2 mean: 122.5 cm/sec  Ao mean P.5 mmHg  Ao V2 VTI: 44.6 cm  RICHARD(I,D): 1.8 cm2  RICHARD(V,D): 1.9 cm2  AI P1/2t: 527.3 msec  LV V1 max P.2 mmHg  LV V1 max: 124.3 cm/sec  LV V1 VTI: 30.5 cm  SV(LVOT): 80.5 ml  SI(LVOT): 55.2 ml/m2  PA V2 max: 100.6 cm/sec  PA max P.0 mmHg  PA acc time: 0.10 sec  AV Severo Ratio (DI): 0.72  RICHARD Index (cm2/m2): 1.2     E/E' av.6  Lateral E/e': 13.0  Medial E/e': 14.1  RV S Severo: 11.9 cm/sec     ______________________________________________________________________________  Report approved by: Prema Paige 09/15/2023 11:17 AM           Procedures:  none     I have personally have reviewed the patient's most up to date radiologic exams, EKG, labs, orders, and medications myself

## 2023-09-15 NOTE — PLAN OF CARE
Goal Outcome Evaluation:       Patient is A&O X 4. Denies pain. Up with assist of 1. Pt is on 2 L nasal cannula. Pt had inhaler scheduled but seemed not available , pharmacy said was sent to ER, daughter took purse home I wonder if was taken with.Patient is pleasant and cooperative with cares.

## 2023-09-15 NOTE — PLAN OF CARE
Physical Therapy Discharge Summary    Reason for therapy discharge:    All goals and outcomes met, no further needs identified.    Progress towards therapy goal(s). See goals on Care Plan in [facility] electronic health record for goal details.  Goals met    Therapy recommendation(s):    Defer to MD re: OT/cognition screening but does have cognition deficits. Pt doing well from PT perspective recommend full time use of 4WW. Concern if remembers to use 4WW and O2. Recommend increased supervision in home, again defer to OT for amount of supervision.

## 2023-09-15 NOTE — PROGRESS NOTES
Pt A&O X4. Denies pain. Daughter at the bedside. Saturating at 94% on 2L N.C. Transferred to the bedside commode with ass of 1. Pt tolerated the transfer. No new concerns noted. Pt transferred to Ortho room 0604.

## 2023-09-15 NOTE — PLAN OF CARE
Goal Outcome Evaluation:      Plan of Care Reviewed With: family, patient    Overall Patient Progress: improvingOverall Patient Progress: improving    Admitting Diagnosis: Aspiration PNA, Resp Failure    Date 9/14/23  PMH: htn/hlp, hx of TIA and left carotid artery stenosis, infrarenal AAA, CKD stage 5 with creat 3.2-3.7 range, generalized anxiety d/o, COPD not on home oxygen    Orientation: A/Ox4 can be confused and forgetful    Vitals/Tele: On tele [id number] Patches changed this shift   Resp: LS Diminished 2L O2 try to wean off O2  Pain: Denies   IV Access/drains: PIV x 1 SL  Diet:Regular/ Soft Takes pills whole  Labs/ Glucose: K+ 2.7 MD replaced with oral recheck in the AM   Mobility:  Ax1 with walker and gait belt   GI/: Denies Nausea, vomiting, or abd pain. LBM 9/14/23 Voiding without difficulty  Wound/Skin: Scattered bruising    Consults:Pt/OT SW  Summary/ Discharge Plans:Possible discharge tomorrow 9/16/23.  May need to go back to the assisted living with O2. Very [place]. Continue to monitor      See Flow sheets for assessment

## 2023-09-16 NOTE — PROGRESS NOTES
[facility]    Medicine Progress Note - Hospitalist Service    Date of Admission:  9/14/2023    Primary Care Physician   Son Terry House  CONSULTANTS: None    Assessment & Plan   Summary of Stay: Inez Collier is a 89 year old female with a history of hypertension, hyperlipidemia, previous TIA and left carotid artery stenosis, infrarenal AAA, CKD stage 5 not on dialysis, generalized anxiety, COPD not on home oxygen, admitted on 9/14/2023 with nausea, vomiting,, generalized weakness complicated by hypoxia with a possible pneumonia.  On admission, she states that she has had  7-10 days of feeling poorly, she's been having periodic lightheadedness mostly like she is dizzy but had not had any 0 vomiting with it over the past 6 weeks or so.  Over the past few days she's been having nausea and vomiting with occasional diarrhea with some blood clots.   She denies any fevers/chills.  A daughter at a family reunion had similar symptoms about 10 days ago.  She had had a junky but non productive cough for the past few days as well.  She denies any aspiration event.  She's been trying to drink fluids but often it just comes right back up.  She's also seemed a bit confused over the past few days as well      In the ER:  VS notable for mild hypoxia 88 % necessitating low level oxygen support, was initially hypotensive which responded to 1 L NS bolus. CMP with bun/creat 64/4.75 , K is low at 2.9. AG 17 with bicarb 26, normal lactate  CBC wbc 24 with lymphopenia and neutrophilia. Sars covid/flu/rsv negative, Trop 74, EKG NSR with RBBB-no ischemic findings and no change from previous       She underwent a CT of the chest showing possible left UL/LL pna, incidentally noted nodule in the RUL 1.4 cm            Problem List:   Acute hypoxic respiratory failure/ pulmonary infiltrate/possible commune acquired pneumonia/COPD  Per H&P, the patient had been coughing a bit but not tons.  She denies current cough to me.   Denies any sputum production well.  Has not had fever, chills, sweats.  Has no chest pain either.  Patient had a CT scan showing possible left-sided pneumonia.  Given her hypoxia she will be treated for pneumonia with antibiotics.  Was started on ceftriaxone which will change to oral Omnicef along with azithromycin.  Complicating the picture however, is her severe renal failure and the fact that she could be volume up.  Her BNP is up to 2500 but again with her renal failure is hard to interpret.  Patient currently is on oxygen and dropped down to the 70s when she got up and about.  I am hesitant to place the patient on any kind of diuretics given her renal failure at this point.  May just need to go home on oxygen.  Goal is to be very conservative in this 89-year-old this was discussed with the patient and her son.  Patient continues on albuterol along with her fluticasone.  Patient currently does not have any wheezing.  Patient is feeling much better but still continues to have hypoxia and will need home oxygen.    Nursing did a home oxygen test showing...        Patient has been assessed for Home Oxygen needs.  Oxygen readings:   *   RA - at rest  Pulse oximetry SPO2 88 %  *   RA - during activity/with exercise SPO2 83 %  *   O2 at  2 liters/minute (at rest) ...SPO2 96 %  *   O2 at  2 liters/minute (during activity/with exercise) ...SPO2 96 %    Hypokalemia:  potassium down to 2.9. will give a dose of K-dur.  Given severe renal failure I am not planning on putting on replacement protocol given the risks with her severe renal disease.          Possible viral gastroenteritis   Had a family member that was ill as well.  Check an enteric panel if her diarrhea persist but she has not had any.  Seemed to have resolved.     Bloody stools/Chronic normocytic anemia  Apparently seen in the ER 9/7 for same. At that time her hgb was stable, VSS, and she opted to discharge home   She is chronically anemic in the 8-9 range.   Hgb here is 9.4 but I suspect some degree of hemoconcentration is with fluids her hemoglobin came down to 7.3.  This point holding blood thinners.  Anemia could also be a cause of her worsening kidney failure.  A blood consent signed and on chart should hgb drop to < 7.0.  Really trying to be conservative and prevent the need for transfusion.  Seemed to resolve.       CHRIS on chronic kidney disease stage V (not on dialysis)  Baseline bun/creat 50's/3.2-3.7, on admission was currently at 65/4.75.  Given her age and BUN and uremia is a concern leading to confusion.  Patient likely does have a component of dehydration but also anemia leading to her renal failure.  Her creatinine on recheck is 4.69.  At this point further work-up is not indicated.  Would hold off on any chlorthalidone as is a terrible diuretic to be using in a 89-year-old.  Consider loop diuretic but at this point the patient seems stable.  BNP is elevated at 2500 which would go with some volume overload at this point.  Is not a dialysis candidate and would not wanted.  She understands that she is getting close to dying and may actually benefit from hospice going forward.  Creatinine is a little better at 4.38.      Elevated troponin  In the past her trops have all been undetectable or wnl (including high sensitivity trop). Trop here is 74 and EKG is without ischemic findings this is in the setting of stage V kidney disease and is not very helpful.  Patient is not a candidate for any kind of angiogram and would not even check this test in the future as we would not be able to do anything with it.  Given her age and multiple medical problems would focus on her comfort if it came to that.  Patient has no signs or symptoms of acute coronary syndrome.  Patient did have an echocardiogram done, for 89-year-old, her echocardiogram looked good.The visual ejection fraction is 60-65%.  Left ventricular systolic function is normal. There is trace aortic  regurgitation.  Trivial pericardial effusion. The rhythm was sinus with wide QRS.  Again given her age and goals of care I would not do any further cardiac testing in the future as it would not change her management.       Evaded anion gap without acidosis  Lactic acid and ketones were normal.  Her anion gap came down to 15 with fluids.  Bicarb is good at 26 which is interesting given her severe renal failure.          Hypertension/history of TIA/left carotid artery stenosis  Given the patient's age our goal is to do no harm and making sure she does not have symptoms of weakness, dizziness, and falls.  She describes some orthostatic type symptoms when she stands up also has dizziness at other times.  At this point her goal should be to have her blood pressure less than 180.  Would not suggest keeping her on chlorthalidone going forward and I will stop her amlodipine.  We will cut her Coreg dose in half and see what happens with her symptoms.  This will also allow for better cerebral blood flow as she has a history of left carotid artery stenosis and a previous TIA.    Recurrent falls  And admits to multiple falls and some of them have been significant per her report.  She is using a four-wheel walker now.  We need to be very careful about the medications we use that could cause potential side effects contributing to her weakness, dizziness, and falls.  The worsening I can happen this 89 year old for her to fall and break her hip.    Hyperlipidemia  Given the patient's age and multiple medical problems her goal is to do no harm.  She is currently is on a statin and her risk for this medication far outweighs the benefit going forward.  We need to make sure that she is not having any Canna weakness or muscle aches and pains which contribute to falls.  We will be stopping her statin at this point.    History abdominal aortic aneurysm  3.5 cm at this point.  Obviously the patient is not a surgical candidate and would not  even follow this going forward.    General anxiety disorder  Patient is on sertraline at home which we will continue    Gastroesophageal reflux disease   Resumed pta sertraline/omeprazole     Incidental RUL nodule  Patient has a 1.4 cm right upper lobe lung nodule.  Given the patient's age she is not a candidate for any further treatment so I would not even follow this up in the future.  This was discussed with the patient and her son.         COVID 19   S/p 5 shot pfizer series 3/2023 (biv x 2)    Advanced care planning  Had a nice long 45-minute discussion with the patient and her son bedside discussing her goals of care.  Patient understands that she would not do well if she required any Canna of her aerobics and is currently DNR/I.  Patient was confused on admission and was not completely sure of what her wishes were.  At this point were actually talking about hospice.  Given her severe renal failure she would qualify along with her ongoing hypoxic respiratory failure.  This would allow the patient to have extra help in providing comfort measures.  Patient currently resides in assisted living and the family is already looking into memory care at the same facility.  They would like to have a primary care provider see her at home and take over all of her medical needs.  They would also like to have hospice involved as well.  Patient's son will discuss this with the facility to see what their options are.  Marisol does go to the facility per the Internet, and I have recommended that the son reach out to them to see if she would qualify being under their care.  Goal is to prevent side effects and symptoms at this point.  I have filled out a new POLST form which includes comfort.  I have not placed comfort orders yet as the goal is to get her back to her facility and continue current treatments  she will be trying to set up a new PCP at her facility so they can see her at home and have them set up home hospice.          4Ms:  Polypharmacy: Medications reviewed with the patient and her son, we will be stopping her Norvasc, chlorthalidone, statin, and decreasing her beta-blocker to decrease the risk of side effects including hypotension, weakness, and will certainly falls.  Mentation:  SLUMS N/A,  BIMS N/A,  CPT N/A,  Capacity comes and goes and does not have full capacity make her own decisions by herself but does lean on her family.  Patient was certainly can get confused due to uremia due to her renal failure along with side effects from medications.  Social support: Patient currently lives in assisted living and the plan is to get her to memory care at the same facility.  Patient has 4 children 2 sons and 2 daughters with one of her daughters living in [place] otherwise they are local.  Mobility: Patient gets around using a 4 wheeled walker  What is importmant: To be comfortable.  Patient states that she has lived a good life and she has nothing to complain about.  She is open to hospice going forward to making sure that she remains comfortable.      Discussed plan of care with bedside nursing, social work/case management, and son bedside updated again      Diet: Combination Diet Regular Diet Adult; Mechanical/Dental Soft Diet  Diet    DVT Prophylaxis: Pneumatic Compression Devices  Parson Catheter: Not present  Lines: None     Cardiac Monitoring: None    RESTRAINTS: Not indicated  Code Status: No CPR- Do NOT Intubate        Follow up plan: Patient's goal is to get to back to her assisted living with oxygen. They will work on pcp at the facility to be sen at home and be set up with hospice Family is going to be moving her into memory care at the same facility in the near future.     This document was created using voice recognition technology.  Please excuse any typographical errors that may have occurred.  Please call with any questions.         # Hypokalemia: Lowest K = 2.7 mmol/L in last 2 days, will replace as needed                                Disposition Plan patient could leave today, I did do the discharge orders however her assisted living cannot take her until Monday, 9/18/2023.  They do not accept admissions over the weekend as there is no intake nurse.     Expected Discharge Date: 09/18/2023      Destination: assisted living            Barrier to discharge: Hypoxia, needed for assisted living to take her on the weekend    Quoc Ken MD  Hospitalist Service  [facility]  Securely message with Si[employer] (more info)  Text page via [facility] Paging/Directory   ______________________________________________________________________    Interval History   Patient overall is feeling better.  Denies any significant shortness of breath or cough.  Denies any fever, chills, sweats.  Still dropping her saturations with any exertion.  She will need to go home with oxygen.  Unfortunately her assisted living does not take patients over the weekend as there is no intake nurse.  Patient's son is bedside he was updated.  Also discussed at length with the patient's nurse.      ROS: A comprehensive review of systems was negative except for items noted in the HPI.  Patient currently denies any fever, chills, sweats, nausea, vomiting, diarrhea, shortness of breath, or chest pain.  The patient's on and off confusion not sure how accurate her review of systems is though she does seem quite with it    Physical Exam   Vital Signs: Temp: 97.3  F (36.3  C) Temp src: Oral BP: 139/61 Pulse: 60   Resp: 12 SpO2: 97 % O2 Device: None (Room air) Oxygen Delivery: 2 LPM  Weight: 114 lbs 3.2 oz    Exam is stable from yesterday  General appearance: Patient is alert and oriented x2, is a little confused but better than admission, no apparent distress, pleasant and conversing normally, speaking in full sentences, appears stated age and is elderly and frail, sitting up in bed wearing a nasal cannula  HEENT:    Mucous membranes are  moist  RESPIRATORY: Few rhonchi in the left but otherwise clear to auscultation bilateraly, good air movement, wearing a nasal cannula  CARDIOVASCULAR: Regular rate and rhythm, normal S1/S2, no murmurs,  GASTROINTESTINAL: Thin, non-distended, non-tender, soft, bowel sounds present throughout  NEUROLOGIC:  Cranial nerves II-XII intact, without any focal deficits, strength 5/5 throughout  EXTREMITIES:  Moves all extremities, no clubbing, cyanosis, nor edema  :  Parson not present         Data     I have personally reviewed the following data over the past 24 hrs:    N/A  \   N/A   / N/A     135 (L) 98 63.8 (H) /  111 (H)   2.9 (L) 26 4.38 (H) \       Imaging:   Results for orders placed or performed during the hospital encounter of 09/14/23   CT Chest Abdomen Pelvis w/o Contrast    Narrative    CT CHEST ABDOMEN PELVIS W/O CONTRAST 9/14/2023 10:59 AM    CLINICAL HISTORY: abd pain, vomiting, hypoxia, eval for acute abd  process and aspiration pna    TECHNIQUE: CT scan of the chest, abdomen, and pelvis was performed  without IV contrast. Multiplanar reformats were obtained. Dose  reduction techniques were used.   CONTRAST: None.    COMPARISON: CT exams 9/7/2023, 4/22/2022 and 11/9/2019    FINDINGS:   LUNGS AND PLEURA: The central airways are clear. Moderate patchy  consolidative and groundglass airspace opacities within the left upper  and left lower lobes. Scattered mild patchy ground glass opacities in  the right upper lobe. Minimal right middle and right lower lobe  groundglass opacities. 1.4 cm right upper lobe solid opacity image 77  series 4, new since the prior CT. No pleural effusion.    MEDIASTINUM/AXILLAE: Enlarged heterogeneous thyroid with multiple  nodules with the largest measuring 1.6 cm, stable since the prior  exam. Stable mildly enlarged precarinal lymph node measuring 1.2 cm  short axis, likely reactive. Normal heart size. Trace pericardial  effusion. Tiny hiatal hernia.    CORONARY ARTERY  CALCIFICATION: Moderate to severe    HEPATOBILIARY: Normal.    PANCREAS: Normal.    SPLEEN: Normal.    ADRENAL GLANDS: Normal.    KIDNEYS/BLADDER: Symmetric mild bilateral renal atrophy. Multiple  bilateral renal cysts. No follow-up is indicated. No hydronephrosis or  hydroureter. Normal bladder.    BOWEL: Normal caliber small bowel without inflammatory changes.  Nonvisualized appendix. Distal colonic diverticulosis without evidence  of acute diverticulitis. No free air or free fluid.    LYMPH NODES: Normal.    VASCULATURE: Moderate aortoiliac atherosclerosis. Tortuous aneurysmal  abdominal aorta measuring up to 3.1 cm in diameter.    PELVIC ORGANS: Hysterectomy. No pelvic free fluid.    MUSCULOSKELETAL: Osseous demineralization. Spinal degenerative  changes.      Impression    IMPRESSION:  1.  Moderate left upper and left lower lobe airspace disease and  minimal right upper lobe infiltrates consistent with pneumonia.  2.  Right upper lobe are 1.4 cm opacity which may be related to  pneumonia. Recommend 3 month CT chest follow-up.  3.  No acute findings in the abdomen or pelvis. No inflammatory  process, hydronephrosis or bowel obstruction.    NANCY BREWSTER MD         SYSTEM ID:  [id number]   Echocardiogram Complete     Value    LVEF  60-65%    Narrative    [id number]  [id number]  [id number]  [id number]^MJ^[id number]^T     [facility]  Echocardiography Laboratory  [address] [place] [address]     Name: ANDREW RAMON  MRN: [id number]  : 1932  Study Date: 09/15/2023 09:35 AM  Age: 89 yrs  Gender: Female  [facility] [place]  Reason For Study: SOB  Ordering Physician: MICKEY BARKER  Referring Physician: Justice House  Performed By: Melina Dobson     BSA: 1.5 m2  Height: 59 in  Weight: 115 lb  HR: 59  BP: 130/121 mmHg  ______________________________________________________________________________  Procedure  Complete Portable Echo  Adult.  ______________________________________________________________________________  Interpretation Summary     The visual ejection fraction is 60-65%.  Left ventricular systolic function is normal.  There is trace aortic regurgitation.  Trivial pericardial effusion  The rhythm was sinus with wide QRS.  ______________________________________________________________________________  Left Ventricle  The left ventricle is normal in size. There is moderate concentric left  ventricular hypertrophy. Diastolic Doppler findings (E/E' ratio and/or other  parameters) suggest left ventricular filling pressures are indeterminate. The  visual ejection fraction is 60-65%. Left ventricular systolic function is  normal.     Right Ventricle  The right ventricle is normal in size and function.     Atria  The left atrium is moderately dilated. Right atrial size is normal. There is  no color Doppler evidence of an atrial shunt.     Mitral Valve  The mitral valve leaflets are mildly thickened. There is trace mitral  regurgitation.     Tricuspid Valve  There is trace tricuspid regurgitation.     Aortic Valve  The aortic valve is trileaflet. There is trace aortic regurgitation. (Despite  the calculated aortic valve area, the aortic valve opens well in the  parasternal short axis and is consistent with the low mean gradient aceoss the  valve.). The mean AoV pressure gradient is 6.5 mmHg. No hemodynamically  significant valvular aortic stenosis.     Pulmonic Valve  There is trace pulmonic valvular regurgitation. Normal pulmonic valve  velocity.     Vessels  The aortic root is normal size. Normal size ascending aorta. IVC diameter and  respiratory changes fall into an intermediate range suggesting an RA pressure  of 8 mmHg.     Pericardium  Trivial pericardial effusion.     Rhythm  The rhythm was sinus with wide QRS.  ______________________________________________________________________________  MMode/2D Measurements &  Calculations  IVSd: 1.5 cm     LVIDd: 3.9 cm  LVIDs: 2.6 cm  LVPWd: 1.4 cm  FS: 31.7 %  LV mass(C)d: 200.9 grams  LV mass(C)dI: 137.8 grams/m2  Ao root diam: 2.9 cm  asc Aorta Diam: 3.3 cm  LVOT diam: 1.8 cm  LVOT area: 2.6 cm2  Ao root diam index Ht(cm/m): 1.9  Ao root diam index BSA (cm/m2): 2.0  Asc Ao diam index BSA (cm/m2): 2.3  Asc Ao diam index Ht(cm/m): 2.2  LA Volume (BP): 66.2 ml     LA Volume Index (BP): 45.3 ml/m2  RWT: 0.70  TAPSE: 2.3 cm     Doppler Measurements & Calculations  MV E max severo: 110.0 cm/sec  MV A max severo: 132.0 cm/sec  MV E/A: 0.83  MV max P.1 mmHg  MV mean PG: 3.4 mmHg  MV V2 VTI: 50.4 cm  MVA(VTI): 1.6 cm2  MV dec time: 0.26 sec  Ao V2 max: 173.0 cm/sec  Ao max P.0 mmHg  Ao V2 mean: 122.5 cm/sec  Ao mean P.5 mmHg  Ao V2 VTI: 44.6 cm  RICHARD(I,D): 1.8 cm2  RICHARD(V,D): 1.9 cm2  AI P1/2t: 527.3 msec  LV V1 max P.2 mmHg  LV V1 max: 124.3 cm/sec  LV V1 VTI: 30.5 cm  SV(LVOT): 80.5 ml  SI(LVOT): 55.2 ml/m2  PA V2 max: 100.6 cm/sec  PA max P.0 mmHg  PA acc time: 0.10 sec  AV Severo Ratio (DI): 0.72  RICHARD Index (cm2/m2): 1.2     E/E' av.6  Lateral E/e': 13.0  Medial E/e': 14.1  RV S Severo: 11.9 cm/sec     ______________________________________________________________________________  Report approved by: Prema Paige 09/15/2023 11:17 AM           Procedures:  none     I have personally have reviewed the patient's most up to date radiologic exams, EKG, labs, orders, and medications myself

## 2023-09-16 NOTE — PROGRESS NOTES
Care Management Follow Up    Length of Stay (days): 2    Expected Discharge Date: 09/16/2023     Concerns to be Addressed: discharge planning     Patient plan of care discussed at interdisciplinary rounds: Yes    Anticipated Discharge Disposition:  [facility] Monday     Anticipated Discharge Services:  Hospice once there- they will set up  Anticipated Discharge DME:    Education Provided on the Discharge Plan:  no  Patient/Family in Agreement with the Plan: yes    Referrals Placed by CM/SW:  NA  Private pay costs discussed: Not applicable    Additional Information:  CM met with patient and son regarding discharging to his home for the weekend. Explained Oxygen would be delivered in large canister to her intermediate and would be impossible to transport to his home and then back to intermediate due to flammability. LLUVIA diaz then said he would take her to the intermediate with 24/7 care. ROBYN explained there is no admitting RN for the weekend. ROBYN then discussed best option is to stay here through the weekend and discharge on Monday to the [facility], have O2 delivered there. Patient and son verbalized understanding.    Ameena Montelongo, RN, BSN, CM  Inpatient Care Coordination  [facility]  [phone]

## 2023-09-16 NOTE — PROGRESS NOTES
Patient has been assessed for Home Oxygen needs.  Oxygen readings:   *   RA - at rest  Pulse oximetry SPO2 88 %  *   RA - during activity/with exercise SPO2 83 %  *   O2 at  2 liters/minute (at rest) ...SPO2 96 %  *   O2 at  2 liters/minute (during activity/with exercise) ...SPO2 96 %

## 2023-09-16 NOTE — PLAN OF CARE
Pt is alert and oriented x4, forgetful. Walked to bathroom, SBA with walker and gait belt. Pt is voiding adequately, had small bowel movement. Min pain to R shoulder, refused Tylenol. Lungs clear, no cough. Pt is on 2L of O2. To keep above 90%. On RA pt down to 83%. Worked on IS. Plan to discharge to AL with O2 and services.

## 2023-09-16 NOTE — PLAN OF CARE
Goal Outcome Evaluation:      Plan of Care Reviewed With: patient    Overall Patient Progress: no changeOverall Patient Progress: no change    Outcome Evaluation: Stable    Admitting Diagnosis: Aspiration PNA, Resp Failure    Date 9/14/23  PMH: htn/hlp, hx of TIA and left carotid artery stenosis, infrarenal AAA, CKD stage 5 with creat 3.2-3.7 range, generalized anxiety d/o, COPD not on home oxygen    Orientation: A/Ox4 can be confused and forgetful    Vitals/Tele: Tele discontinue Vitals stable   Resp: LS clear 2L O2 will discharge with O2  Pain: Denies   IV Access/drains: PIV x 1 SL  Diet:Regular/ Soft Takes pills whole  Labs/ Glucose: K+ 2.9 MD replaced with oral recheck in the AM   Mobility:  Ax1 with walker and gait belt   GI/: Denies Nausea, vomiting, or abd pain. LBM 9/14/23 Voiding without difficulty  Wound/Skin: Scattered bruising    Consults:Pt/OT SW  Summary/Discharge Plan: Will discharge Monday back to [facility] assisted living with home O2. O2 for RN to set-up on Monday AM.  Continue to monitor.      See Flow sheets for assessment

## 2023-09-17 NOTE — PLAN OF CARE
Pt is alert and oriented x4, forgetful. Assist of 1 with transfers. Pain to R shoulder controlled with prn Tylenol. Pt walks to bathroom, voiding. On 2L of O2. Plan to discharge to [facility] with O2 on Monday.

## 2023-09-17 NOTE — PROGRESS NOTES
Updated progress note    Preliminary discharge orders as well as a discharge summary have been done by me.  Patient has to stay until her assisted living is able to accept her.  That means staying over the weekend until Monday, 9/18.    Quoc Ken MD

## 2023-09-17 NOTE — PLAN OF CARE
Goal Outcome Evaluation:      Plan of Care Reviewed With: patient    Overall Patient Progress: no changeOverall Patient Progress: no change    Outcome Evaluation: Stable    Admitting Diagnosis: Aspiration PNA, Resp Failure    Date 9/14/23  PMH: htn/hlp, hx of TIA and left carotid artery stenosis, infrarenal AAA, CKD stage 5 with creat 3.2-3.7 range, generalized anxiety d/o, COPD not on home oxygen    Orientation: A/Ox4 can be confused and forgetful    Vitals/Tele: Tele discontinue Vitals stable   Resp: LS clear/ diminished 2L O2 will discharge with O2  Pain: Denies   IV Access/drains: IV removed okay not to replace per MD  Diet:Regular/ Soft Takes pills whole  Mobility:  Ax1 with walker and gait belt   GI/: Denies Nausea, vomiting, or abd pain. LBM 9/17/23 Voiding without difficulty  Wound/Skin: Scattered bruising    Consults:Pt/OT SW  Summary/Discharge Plan: Will discharge Monday back to [facility] assisted living with home O2. O2 for RN to set-up on Monday AM. O2 walking test done 9/16/23, note in chart.  Continue to monitor.      See Flow sheets for assessment

## 2023-09-17 NOTE — DISCHARGE SUMMARY
[facility]  Hospitalist Discharge Summary      Date of Admission:  9/14/2023  Date of Discharge:  9/18/23  Discharging Provider: Quoc Ken MD  Discharge Service: Hospitalist Service  Primary Care Physician   Son Terry House    Discharge Diagnoses   Acute hypoxic respiratory failure  Possible community-acquired pneumonia  COPD  Volume overload  Hypokalemia  Hemorrhoidal bleeding  Hypokalemia  Possible viral gastroenteritis, resolved  Chronic normocytic anemia  Bloody stools  Stage V chronic kidney disease, not on dialysis  Elevated troponin  Elevated anion gap without acidosis  Hypertension  History of TIA  Left carotid stenosis  Recurrent falls  Hyperlipidemia  Abdominal aortic aneurysm  Generalized anxiety disorder  GERD  Pulmonary nodule  Advance care planning  New hospice patient    Hospital Course     Summary of Stay: Inez Collier is a 89 year old female with a history of hypertension, hyperlipidemia, previous TIA and left carotid artery stenosis, infrarenal AAA, CKD stage 5 not on dialysis, generalized anxiety, COPD not on home oxygen, admitted on 9/14/2023 with nausea, vomiting,, generalized weakness complicated by hypoxia with a possible pneumonia vs volume overload related to worsening renal failure.  On admission, she states that she has had  7-10 days of feeling poorly, she's been having periodic lightheadedness mostly like she is dizzy but had not had any 0 vomiting with it over the past 6 weeks or so.  Over the past few days she's been having nausea and vomiting with occasional diarrhea with some blood clots.   She denies any fevers/chills.  A daughter at a family reunion had similar symptoms about 10 days ago.  She had had a junky but non productive cough for the past few days as well.  She denies any aspiration event.  She's been trying to drink fluids but often it just comes right back up.  She's also seemed a bit confused over the past few days as well      In the ER:   VS notable for mild hypoxia 88 % necessitating low level oxygen support, was initially hypotensive which responded to 1 L NS bolus. CMP with bun/creat 64/4.75 , K is low at 2.9. AG 17 with bicarb 26, normal lactate  CBC wbc 24 with lymphopenia and neutrophilia. Sars covid/flu/rsv negative, Trop 74, EKG NSR with RBBB-no ischemic findings and no change from previous       She underwent a CT of the chest showing possible left UL/LL pna, incidentally noted nodule in the RUL 1.4 cm            Problem List:   Acute hypoxic respiratory failure/ pulmonary infiltrate/possible commune acquired pneumonia/COPD/possible volume overload  Per H&P, the patient had been coughing a bit but not tons.  She denies current cough to me.  Denies any sputum production well.  Has not had fever, chills, sweats.  Has no chest pain either.  Patient had a CT scan showing possible left-sided pneumonia.  Given her hypoxia she will be treated for pneumonia with antibiotics.  Was started on ceftriaxone which will change to oral Omnicef along with azithromycin.  Complicating the picture however, is her severe renal failure and the fact that she could be volume up.  Her BNP is up to 2500 but again with her renal failure is hard to interpret.  Patient currently is on oxygen and dropped down to the 70s when she got up and about.  I am hesitant to place the patient on any kind of diuretics given her renal failure at this point and the fact that she is feeling better.  At this point will be going home with oxygen.  Goal is to be very conservative in this 89-year-old and this was discussed with the patient and her son were in agreement with the plan of care.  Patient continues on albuterol along with her fluticasone.  Patient currently does not have any wheezing.  Patient is feeling much better but still continues to have hypoxia and will need home oxygen at 2 L/min.   Nursing did a home oxygen test showing...        Patient has been assessed for Home  Oxygen needs.  Oxygen readings:   *   RA - at rest  Pulse oximetry SPO2 88 %  *   RA - during activity/with exercise SPO2 83 %  *   O2 at  2 liters/minute (at rest) ...SPO2 96 %  *   O2 at  2 liters/minute (during activity/with exercise) ...SPO2 96 %     Hypokalemia:  potassium down to 2.9. will give a dose of K-dur.  Given severe renal failure I am not planning on putting on replacement protocol given the risks with her severe renal disease.          Possible viral gastroenteritis   Had a family member that was ill as well.  Check an enteric panel if her diarrhea persist but she has not had any.  Seemed to have resolved and labs were not able to be obtained.     Bloody stools/Chronic normocytic anemia  Apparently seen in the ER 9/7 for same. At that time her hgb was stable, VSS, and she opted to discharge home . She is chronically anemic in the 8-9 range.  Hgb here is 9.4 but I suspect some degree of hemoconcentration is with fluids her hemoglobin came down to 7.3.  This point holding blood thinners.  Anemia could also be a cause of her worsening kidney failure.  A blood consent signed and on chart should hgb drop to < 7.0.  Really trying to be conservative and prevent the need for transfusion.  Seemed to resolve.  Not planning on following her labs.     CHRIS on chronic kidney disease stage V (not on dialysis)  Baseline bun/creat 50's/3.2-3.7, on admission was currently at 65/4.75.  Given her age and BUN and uremia is a concern leading to confusion.  Patient likely does have a component of dehydration but also anemia leading to her renal failure.  Her creatinine on recheck is 4.69.  At this point further work-up is not indicated.  Would hold off on any chlorthalidone as is a terrible diuretic to be using in a 89-year-old.  Consider loop diuretic but at this point the patient seems stable.  BNP is elevated at 2500 which would go with some volume overload at this point.  Is not a dialysis candidate and would not  wanted.  She understands that she is getting close to dying and may actually benefit from hospice going forward.  Creatinine is a little better at 4.38.  Given her goal of comfort, not planning on rechecking her labs.     Elevated troponin  In the past her trops have all been undetectable or wnl (including high sensitivity trop). Trop here is 74 and EKG is without ischemic findings this is in the setting of stage V kidney disease and is not very helpful.  Patient is not a candidate for any kind of angiogram and would not even check this test in the future as we would not be able to do anything with it.  Given her age and multiple medical problems would focus on her comfort if it came to that.  Patient has no signs or symptoms of acute coronary syndrome.  Patient did have an echocardiogram done, for 89-year-old, her echocardiogram looked good.The visual ejection fraction is 60-65%.  Left ventricular systolic function is normal. There is trace aortic regurgitation.  Trivial pericardial effusion. The rhythm was sinus with wide QRS.  Again given her age and goals of care I would not do any further cardiac testing in the future as it would not change her management.        Evaded anion gap without acidosis  Lactic acid and ketones were normal.  Her anion gap came down to 15 with fluids.  Bicarb is good at 26 which is interesting given her severe renal failure.           Hypertension/history of TIA/left carotid artery stenosis  Given the patient's age our goal is to do no harm and making sure she does not have symptoms of weakness, dizziness, and falls.  She describes some orthostatic type symptoms when she stands up also has dizziness at other times.  At this point her goal should be to have her blood pressure less than 180.  Would not suggest keeping her on chlorthalidone going forward and I will stop her amlodipine.  We will cut her Coreg dose in half and see what happens with her symptoms.  This will also allow for  better cerebral blood flow as she has a history of left carotid artery stenosis and a previous TIA.     Recurrent falls  Patient admits to multiple falls and some of them have been significant per her report.  She is using a four-wheel walker now.  We need to be very careful about the medications we use that could cause potential side effects contributing to her weakness, dizziness, and falls.  The worsening I can happen this 89 year old for her to fall and break her hip.     Hyperlipidemia  Given the patient's age and multiple medical problems her goal is to do no harm.  She is currently on a statin at this point her risk for this medication far outweighs the benefit going forward.  We need to make sure that she is not having any side effects like weakness or muscle aches and pains which contribute to falls.  We will be stopping her statin at this point.    History abdominal aortic aneurysm  3.5 cm at this point.  Obviously the patient is not a surgical candidate and would not even follow this going forward.     General anxiety disorder  Patient is on sertraline at home which we will continue     Gastroesophageal reflux disease   Resumed pta sertraline/omeprazole     Incidental RUL pulmonary nodule  Patient has a 1.4 cm right upper lobe lung nodule.  Given the patient's age she is not a candidate for any further treatment so I would not even follow this up in the future.  This was discussed with the patient and her son.  This could represent a new malignancy.      Bleeding per rectum  Patient did have some blood when having her bowel movements.  She has a history of hemorrhoidal bleeding which is consistent with.  Given her goals of comfort no further work-up to be done.     COVID 19   S/p 5 shot pfizer series 3/2023 (biv x 2)     Advanced care planning  Had a nice long 45-minute discussion 9/15/2023 with the patient and her son bedside discussing her goals of care.  Patient understands that she would not do well  if she required any Canna of her aerobics and is currently DNR/I.  Patient was confused on admission and was not completely sure of what her wishes were.  At this point were actually talking about hospice.  Given her severe renal failure she would qualify along with her ongoing hypoxic respiratory failure.  This would allow the patient to have extra help in providing comfort measures.  Patient currently resides in assisted living and the family is already looking into memory care at the same facility.  They would like to have a primary care provider see her at home and take over all of her medical needs.  They would also like to have hospice involved as well.  Patient's son will discuss this with the facility to see what their options are.  Marisol does go to the facility per the Internet, and I have recommended that the son reach out to them to see if she would qualify being under their care.  Goal is to prevent side effects and symptoms at this point.  I have filled out a new POLST form which includes comfort.  I have not placed comfort orders yet as the goal is to get her back to her facility and continue current treatments  she will be trying to set up a new PCP at her facility so they can see her at home and have them set up home hospice.             4Ms:  Polypharmacy: Medications reviewed with the patient and her son, we will be stopping her Norvasc, chlorthalidone, statin, and decreasing her beta-blocker to decrease the risk of side effects including hypotension, weakness, and will certainly falls.  Mentation:  SLUMS N/A,  BIMS N/A,  CPT N/A,  Capacity comes and goes and does not have full capacity make her own decisions by herself but does lean on her family.  Patient was certainly can get confused due to uremia due to her renal failure along with side effects from medications.  Social support: Patient currently lives in assisted living and the plan is to get her to memory care at the same facility.   Patient has 4 children 2 sons and 2 daughters with one of her daughters living in [place] otherwise they are local.  Mobility: Patient gets around using a 4 wheeled walker  What is importmant: To be comfortable.  Patient states that she has lived a good life and she has nothing to complain about.  She is open to hospice going forward to making sure that she remains comfortable.    Clinically Significant Risk Factors          Significant Results and Procedures   Most Recent 3 CBC's:  Recent Labs   Lab Test 09/15/23  0608 09/14/23  0940 09/07/23  1532   WBC 12.9* 23.6* 7.1   HGB 7.3* 9.4* 8.5*   MCV 87 86 86    245 208     Most Recent 3 BMP's:  Recent Labs   Lab Test 09/16/23  0603 09/15/23  0608 09/14/23  0940   * 136 136   POTASSIUM 2.9* 2.7* 2.9*   CHLORIDE 98 97* 93*   CO2 26 24 26   BUN 63.8* 68.5* 64.4*   CR 4.38* 4.69* 4.75*   ANIONGAP 11 15 17*   RIGO 7.9* 8.0* 9.5   * 107* 118*   ,   Results for orders placed or performed during the hospital encounter of 09/14/23   CT Chest Abdomen Pelvis w/o Contrast    Narrative    CT CHEST ABDOMEN PELVIS W/O CONTRAST 9/14/2023 10:59 AM    CLINICAL HISTORY: abd pain, vomiting, hypoxia, eval for acute abd  process and aspiration pna    TECHNIQUE: CT scan of the chest, abdomen, and pelvis was performed  without IV contrast. Multiplanar reformats were obtained. Dose  reduction techniques were used.   CONTRAST: None.    COMPARISON: CT exams 9/7/2023, 4/22/2022 and 11/9/2019    FINDINGS:   LUNGS AND PLEURA: The central airways are clear. Moderate patchy  consolidative and groundglass airspace opacities within the left upper  and left lower lobes. Scattered mild patchy ground glass opacities in  the right upper lobe. Minimal right middle and right lower lobe  groundglass opacities. 1.4 cm right upper lobe solid opacity image 77  series 4, new since the prior CT. No pleural effusion.    MEDIASTINUM/AXILLAE: Enlarged heterogeneous thyroid with multiple  nodules  with the largest measuring 1.6 cm, stable since the prior  exam. Stable mildly enlarged precarinal lymph node measuring 1.2 cm  short axis, likely reactive. Normal heart size. Trace pericardial  effusion. Tiny hiatal hernia.    CORONARY ARTERY CALCIFICATION: Moderate to severe    HEPATOBILIARY: Normal.    PANCREAS: Normal.    SPLEEN: Normal.    ADRENAL GLANDS: Normal.    KIDNEYS/BLADDER: Symmetric mild bilateral renal atrophy. Multiple  bilateral renal cysts. No follow-up is indicated. No hydronephrosis or  hydroureter. Normal bladder.    BOWEL: Normal caliber small bowel without inflammatory changes.  Nonvisualized appendix. Distal colonic diverticulosis without evidence  of acute diverticulitis. No free air or free fluid.    LYMPH NODES: Normal.    VASCULATURE: Moderate aortoiliac atherosclerosis. Tortuous aneurysmal  abdominal aorta measuring up to 3.1 cm in diameter.    PELVIC ORGANS: Hysterectomy. No pelvic free fluid.    MUSCULOSKELETAL: Osseous demineralization. Spinal degenerative  changes.      Impression    IMPRESSION:  1.  Moderate left upper and left lower lobe airspace disease and  minimal right upper lobe infiltrates consistent with pneumonia.  2.  Right upper lobe are 1.4 cm opacity which may be related to  pneumonia. Recommend 3 month CT chest follow-up.  3.  No acute findings in the abdomen or pelvis. No inflammatory  process, hydronephrosis or bowel obstruction.    NANCY BREWSTER MD         SYSTEM ID:  [id number]   Echocardiogram Complete     Value    LVEF  60-65%    Narrative    [id number]  [id number]  [id number]  [id number]^MICKEY^NADIYA     [facility]  Echocardiography Laboratory  [address] [place] [address]     Name: ANDREW RAMON  MRN: [id number]  : 1932  Study Date: 09/15/2023 09:35 AM  Age: 89 yrs  Gender: Female  Patient Location: [facility]  Reason For Study: SOB  Ordering Physician: MICKEY BARKER  Referring Physician: Justice House  Performed By: Melina MONTANO  Bronson     BSA: 1.5 m2  Height: 59 in  Weight: 115 lb  HR: 59  BP: 130/121 mmHg  ______________________________________________________________________________  Procedure  Complete Portable Echo Adult.  ______________________________________________________________________________  Interpretation Summary     The visual ejection fraction is 60-65%.  Left ventricular systolic function is normal.  There is trace aortic regurgitation.  Trivial pericardial effusion  The rhythm was sinus with wide QRS.  ______________________________________________________________________________  Left Ventricle  The left ventricle is normal in size. There is moderate concentric left  ventricular hypertrophy. Diastolic Doppler findings (E/E' ratio and/or other  parameters) suggest left ventricular filling pressures are indeterminate. The  visual ejection fraction is 60-65%. Left ventricular systolic function is  normal.     Right Ventricle  The right ventricle is normal in size and function.     Atria  The left atrium is moderately dilated. Right atrial size is normal. There is  no color Doppler evidence of an atrial shunt.     Mitral Valve  The mitral valve leaflets are mildly thickened. There is trace mitral  regurgitation.     Tricuspid Valve  There is trace tricuspid regurgitation.     Aortic Valve  The aortic valve is trileaflet. There is trace aortic regurgitation. (Despite  the calculated aortic valve area, the aortic valve opens well in the  parasternal short axis and is consistent with the low mean gradient aceoss the  valve.). The mean AoV pressure gradient is 6.5 mmHg. No hemodynamically  significant valvular aortic stenosis.     Pulmonic Valve  There is trace pulmonic valvular regurgitation. Normal pulmonic valve  velocity.     Vessels  The aortic root is normal size. Normal size ascending aorta. IVC diameter and  respiratory changes fall into an intermediate range suggesting an RA pressure  of 8 mmHg.     Pericardium  Trivial  pericardial effusion.     Rhythm  The rhythm was sinus with wide QRS.  ______________________________________________________________________________  MMode/2D Measurements & Calculations  IVSd: 1.5 cm     LVIDd: 3.9 cm  LVIDs: 2.6 cm  LVPWd: 1.4 cm  FS: 31.7 %  LV mass(C)d: 200.9 grams  LV mass(C)dI: 137.8 grams/m2  Ao root diam: 2.9 cm  asc Aorta Diam: 3.3 cm  LVOT diam: 1.8 cm  LVOT area: 2.6 cm2  Ao root diam index Ht(cm/m): 1.9  Ao root diam index BSA (cm/m2): 2.0  Asc Ao diam index BSA (cm/m2): 2.3  Asc Ao diam index Ht(cm/m): 2.2  LA Volume (BP): 66.2 ml     LA Volume Index (BP): 45.3 ml/m2  RWT: 0.70  TAPSE: 2.3 cm     Doppler Measurements & Calculations  MV E max severo: 110.0 cm/sec  MV A max severo: 132.0 cm/sec  MV E/A: 0.83  MV max P.1 mmHg  MV mean PG: 3.4 mmHg  MV V2 VTI: 50.4 cm  MVA(VTI): 1.6 cm2  MV dec time: 0.26 sec  Ao V2 max: 173.0 cm/sec  Ao max P.0 mmHg  Ao V2 mean: 122.5 cm/sec  Ao mean P.5 mmHg  Ao V2 VTI: 44.6 cm  RICHARD(I,D): 1.8 cm2  RICHARD(V,D): 1.9 cm2  AI P1/2t: 527.3 msec  LV V1 max P.2 mmHg  LV V1 max: 124.3 cm/sec  LV V1 VTI: 30.5 cm  SV(LVOT): 80.5 ml  SI(LVOT): 55.2 ml/m2  PA V2 max: 100.6 cm/sec  PA max P.0 mmHg  PA acc time: 0.10 sec  AV Severo Ratio (DI): 0.72  RICHARD Index (cm2/m2): 1.2     E/E' av.6  Lateral E/e': 13.0  Medial E/e': 14.1  RV S Severo: 11.9 cm/sec     ______________________________________________________________________________  Report approved by: Prema Paige 09/15/2023 11:17 AM                Follow up/instructions: The patient will be discharging back home and likely transition to memory care at some point.  The family is looking into having a primary care provider at the facility see her to make it easier.  They will need to have hospice set up as well is the goal now is really her comfort.  Patient was kept an extra couple days until the facility could accept the patient after the weekend.    Pending test results at discharge:       Unresulted Labs Ordered in the Past 30 Days of this Admission       Date and Time Order Name Status Description    9/14/2023 10:26 AM Blood Culture Arm, Left Preliminary     9/14/2023  9:57 AM Blood Culture Peripheral Blood Preliminary              Discharge Orders      Home Care Referral      Reason for your hospital stay    CHF and renal failure     Follow-up and recommended labs and tests     Follow up with primary care provider, [facility] or other, within 7 days for hospital follow- up.  No follow up labs or test are needed. Follow up comfort.  Goal to have a PCP  that can see her at home and to have hospice set up.     Activity    Your activity upon discharge: activity as tolerated     No CPR- Do NOT Intubate    Comfort approach and try not to hospitalize     Oxygen Adult/Peds    Oxygen Documentation  I certify that this patient, Inez Collier has been under my care (or a nurse practitioner or physican's assistant working with me). This is the face-to-face encounter for oxygen medical necessity.      At the time of this encounter supplemental oxygen is reasonable and necessary and is expected to improve the patient's condition in a home setting.       Patient has continued oxygen desaturation due to Chronic Heart Failure I50  COPD J44.9  End stage renal failure.    88% at rest on room air  83% with exercise on room air  96% on 2 lpm at rest and exercise    If portability is ordered, is the patient mobile within the home? yes     Diet    Follow this diet upon discharge: Orders Placed This Encounter      Combination Diet Regular Diet Adult; Mechanical/Dental Soft Diet       Discharge Disposition   Discharged to assisted living  Condition at discharge: Stable      Consultations This Hospital Stay   PHYSICAL THERAPY ADULT IP CONSULT  CARE MANAGEMENT / SOCIAL WORK IP CONSULT  CARE MANAGEMENT / SOCIAL WORK IP CONSULT  SPIRITUAL HEALTH SERVICES IP CONSULT    Code Status   No CPR- Do NOT Intubate    Time Spent  on this Encounter   I, Quoc Ken MD, personally saw the patient today and spent greater than 30 minutes discharging this patient.  Chip with nursing           This document was created using voice recognition technology.  Please excuse any typographical errors that may have occurred.  Please call with any questions.       Quoc Ken MD  [facility]  [address]  [facility] [address]  Phone: [phone]  Fax: [phone]  ______________________________________________________________________    Physical Exam   Vital Signs: Temp: 98.4  F (36.9  C) Temp src: Oral BP: (!) 149/62 Pulse: 61   Resp: 12 SpO2: 97 % O2 Device: Nasal cannula Oxygen Delivery: 2 LPM  Weight: 112 lbs 12.8 oz    Exam is unchanged from yesterday,   General appearance: Patient is alert and oriented x2, is less confused , appropriate, no apparent distress, pleasant and conversing normally, speaking in full sentences, appears stated age and is elderly and frail, sitting up in bed and is wearing a nasal cannula  HEENT:    Mucous membranes are moist  RESPIRATORY: Few rhonchi in the left but otherwise clear to auscultation bilateraly, sounds better overall, good air movement, wearing a nasal cannula  CARDIOVASCULAR: Regular rate and rhythm, normal S1/S2, no murmurs,  GASTROINTESTINAL: Thin, non-distended, non-tender, soft, bowel sounds present throughout  NEUROLOGIC:  Cranial nerves II-XII intact, without any focal deficits, strength 5/5 throughout  EXTREMITIES:  Moves all extremities, no clubbing, cyanosis, nor edema  :  Parson not present         Discharge Medications   Current Discharge Medication List        START taking these medications    Details   acetaminophen (TYLENOL) 325 MG tablet Take 2 tablets (650 mg) by mouth every 6 hours as needed for mild pain or other (and adjunct with moderate or severe pain or per patient request)    Associated Diagnoses: Pain      azithromycin (ZITHROMAX) 250  MG tablet Take 1 tablet (250 mg) by mouth daily for 3 days  Qty: 3 tablet, Refills: 0    Associated Diagnoses: COPD exacerbation (H); Pneumonia of left lower lobe due to infectious organism      cefdinir (OMNICEF) 300 MG capsule Take 1 capsule (300 mg) by mouth daily for 4 days  Qty: 4 capsule, Refills: 0    Associated Diagnoses: COPD exacerbation (H); Pneumonia of left lower lobe due to infectious organism           CONTINUE these medications which have CHANGED    Details   carvedilol (COREG) 12.5 MG tablet Take 1 tablet (12.5 mg) by mouth 2 times daily (with meals) for 30 days  Qty: 60 tablet, Refills: 0    Associated Diagnoses: Essential hypertension           CONTINUE these medications which have NOT CHANGED    Details   albuterol (PROAIR HFA/PROVENTIL HFA/VENTOLIN HFA) 108 (90 Base) MCG/ACT inhaler Inhale 2 puffs into the lungs every 6 hours    Comments: Pharmacy may dispense brand covered by insurance (Proair, or proventil or ventolin or generic albuterol inhaler)      albuterol (PROVENTIL) (2.5 MG/3ML) 0.083% neb solution Take 1 vial (2.5 mg) by nebulization every 6 hours as needed for shortness of breath / dyspnea or wheezing  Qty: 20 vial, Refills: 1    Associated Diagnoses: COPD exacerbation (H)      ferrous sulfate (FEROSUL) 325 (65 Fe) MG tablet Take 325 mg by mouth daily (with breakfast)      fluticasone (ARNUITY ELLIPTA) 200 MCG/ACT inhaler Inhale 1 puff into the lungs daily      gabapentin (NEURONTIN) 100 MG capsule Take 100 mg by mouth 2 times daily      ketoconazole (NIZORAL) 2 % external shampoo Apply topically daily as needed for itching or irritation      sertraline (ZOLOFT) 100 MG tablet Take 150 mg by mouth every morning           STOP taking these medications       amLODIPine (NORVASC) 10 MG tablet Comments:   Reason for Stopping:         aspirin (ASA) 81 MG tablet Comments:   Reason for Stopping:         atorvastatin (LIPITOR) 40 MG tablet Comments:   Reason for Stopping:         Calcium  Citrate-Vitamin D (CALCIUM CITRATE + D3) 200-250 MG-UNIT TABS Comments:   Reason for Stopping:         chlorthalidone (HYGROTON) 25 MG tablet Comments:   Reason for Stopping:         glucosamine-chondroitin 500-400 MG CAPS Comments:   Reason for Stopping:         omeprazole (PRILOSEC) 20 MG DR capsule Comments:   Reason for Stopping:         ondansetron (ZOFRAN-ODT) 4 MG ODT tab Comments:   Reason for Stopping:             Allergies   Allergies   Allergen Reactions    Lisinopril     Morphine Hcl

## 2023-09-18 NOTE — PROGRESS NOTES
Care Management Follow Up    Length of Stay (days): 4    Expected Discharge Date: 09/18/2023     Concerns to be Addressed: discharge planning     Patient plan of care discussed at interdisciplinary rounds: Yes    Anticipated Discharge Disposition:  home with hospice     Patient/Family in Agreement with the Plan: yes    Referrals Placed by CM/SW:  none  Private pay costs discussed: transportation costs    Additional Information:  SW met pt's DIL, daughter and son was called to discuss discharge planning.  Pt is going to admit to [facility], son has been communicating with [facility], [phone].  Provider plans to discharge pt tomorrow.  Pt will need O2.  SW informed them how discharge to hospice typically goes.  Pt discharges from here and admits within a couple hours to hospice and have needs at the home (DME, O2) ready for pt.      DREA talked to [facility], they will admit pt tomorrow at 1 pm.  They will have 4L O2, hospital bed, over the bed table and commode there.  DREA faxed orders signed so far, [phone]. Pt will need 3-days of medications.    Son confirmed medications be filled here and sent home. Son approved [facility] transport and costs.    Pt will transport [facility] 11:23-12:08, informed son.    Sw left VM on [facility] nursing phone, [phone], stating the above. DREA will fax orders to [phone].    Updated provider on discharge plans tomorrow and medications.    BRISEIDA Simmons, [facility]  Inpatient Care Coordination  [facility]  [phone]

## 2023-09-18 NOTE — CONSULTS
SPIRITUAL HEALTH SERVICES - Consult Note  [facility]    Referral Source: [facility] Consult    Present: I was accompanied by Staff  Reji Marcelo. Pt's son Jean Paul was present.    Assessment/Intervention: Pt named her family as supportive people in her life. Pt welcomed prayer.    Plan: No further plans as Pt will discharge soon.    Israel Amaya, [facility]    Intern    [facility] routine referrals *[phone]  [facility] available 24/7 for emergent requests/referrals, either by paging the on-call  or by entering an ASAP/STAT consult in Epic (this will also page the on-call ).

## 2023-09-18 NOTE — PLAN OF CARE
Pt O2Sat went down to 80% on 2L, and was not staying up even on 5L of O2. Neb given, Dr kong, new orders received, X ray done, oxy mask applied, new PIV placed, abx started. Writer transferred pt to [facility] due to increased weakness and SOB. Pt is on $L 93% now via Oxymask.

## 2023-09-18 NOTE — PROGRESS NOTES
[facility]    Hospitalist CrosssCover Note  Name: Inez Collier    MRN: [id number]  Provider: Concepcion Triana MD  Date of Service: 09/18/2023    Assessment & Plan     Patient with increasing oxygen need, patient with shortness of breath now requiring 5 L supplemental O2     Summary of Stay: Inez Collier is a with a history of hypertension, hyperlipidemia, previous TIA and left carotid artery stenosis, infrarenal AAA, CKD stage 5 not on dialysis, generalized anxiety, COPD not on home oxygen, admitted on 9/14/2023 with nausea, vomiting,, generalized weakness complicated by hypoxia with a possible pneumonia vs volume overload related to worsening renal failure     Acute on chronic respiratory failure with increasing O2 needs  -Worsening leukocytosis  -Chest x-ray ordered pending  -Changed nasal cannula to facemask to improve oxygenation.  -Ordered IV antibiotics  -Wean supplemental O2 as able  -Readdress discharge planning and goals of care in a.m.  -Basic metabolic panel pending  -Chest x-ray pending        Code Status: No CPR- Do NOT Intubate    Disposition: Discharge was scheduled for earlier but patient is now requiring more supplemental O2.  Chest x-ray pending.  We will need to readdress discharge plan based on clinical response and discussion with patient and family.  Resumed IV antibiotics.    Total time spent in direct patient care more than 35 minutes.      Interval History   Cross cover paged as patient was needing more oxygen.  Keeping sats barely above 90 on 5 L supplemental O2.  Patient was feeling more short of breath.  Denies any chest pain no fever or chills.  More than 10 point review of systems was carried out was otherwise negative.    -Data reviewed today: I reviewed all new labs and imaging reports over the last 24 hours. I personally reviewed the chest x-ray image(s) showing ordered pending .    Physical Exam   Temp: 97.3  F (36.3  C) Temp src: Oral BP: (!) 142/77 Pulse: 73   Resp:  20 SpO2: 97 % O2 Device: Nasal cannula Oxygen Delivery: 5 LPM (between 4 and 5 LPM)  Vitals:    09/17/23 0700 09/17/23 1107 09/18/23 0449   Weight: 51.2 kg (112 lb 12.8 oz) 52.2 kg (115 lb 1.3 oz) 51.2 kg (112 lb 14 oz)     Vital Signs with Ranges  Temp:  [97.3  F (36.3  C)-98.4  F (36.9  C)] 97.3  F (36.3  C)  Pulse:  [60-75] 73  Resp:  [12-20] 20  BP: (126-155)/(62-82) 142/77  SpO2:  [94 %-97 %] 97 %  I/O last 3 completed shifts:  In: -   Out: 200 [Emesis/NG output:200]      GEN:  Alert, oriented x 3, tachypneic   HEENT:  Normocephalic/atraumatic, no scleral icterus, no nasal discharge, mouth dry.  CV:  Regular rate and rhythm, no murmur or JVD.  S1 + S2 noted, no S3 or S4.  LUNGS: Scattered crackles.  Bilateral air entry  ABD:  Active bowel sounds, soft, non-tender/non-distended.  No rebound/guarding/rigidity.  EXT:  No edema.  No cyanosis.  No joint synovitis noted.  SKIN:  Dry to touch, no exanthems noted in the visualized areas.    Medications      albuterol  2 puff Inhalation Q6H    azithromycin  250 mg Oral Daily    carvedilol  12.5 mg Oral BID w/meals    cefdinir  300 mg Oral Daily    ferrous sulfate  325 mg Oral Daily with breakfast    fluticasone  1 puff Inhalation Daily    gabapentin  100 mg Oral BID    pantoprazole  40 mg Oral QAM AC    sertraline  150 mg Oral QAM    sodium chloride (PF)  3 mL Intracatheter Q8H     Data     Recent Labs   Lab 09/18/23  0559 09/15/23  0608 09/14/23  0940   WBC 14.6* 12.9* 23.6*   HGB 8.8* 7.3* 9.4*   HCT 25.9* 21.4* 27.7*   MCV 86 87 86    175 245     Recent Labs   Lab 09/16/23  0603 09/15/23  0608 09/14/23  0940   * 136 136   POTASSIUM 2.9* 2.7* 2.9*   CHLORIDE 98 97* 93*   CO2 26 24 26   ANIONGAP 11 15 17*   * 107* 118*   BUN 63.8* 68.5* 64.4*   CR 4.38* 4.69* 4.75*   GFRESTIMATED 9* 8* 8*   RIGO 7.9* 8.0* 9.5       No results found for this or any previous visit (from the past 24 hour(s)).

## 2023-09-18 NOTE — PLAN OF CARE
Goal Outcome Evaluation:      Plan of Care Reviewed With: patient, family    Overall Patient Progress: improving    Pt A&O x4-forgetful @ times. VS stable; afebrile. Remains on 4L O2, switching between nasal cannula and oxymask, oxymask needed when sleeping. K replacement given per hospitalist order. PO oxycodone and tylenol managing abdominal pain. CMS intact. Up w/ Ax1, using gait belt, and walker. Pt having reports of dizziness with activity this AM but has since improved. Voiding. Having BM's. Tolerating mech/soft diet, minimal appetite. Encouraging fluids. Pt did have an emesis this AM while up to the commode, but felt better after returning to bed. No reports of nausea this afternoon/evening. Plan is to return home tomorrow afternoon.

## 2023-09-18 NOTE — PROGRESS NOTES
[facility]    Hospitalist CrosssCover Note  Name: Inez Collier    MRN: [id number]  Provider: Estefania Stone MD  Date of Service: 09/18/2023    Assessment & Plan   Summary of Stay: Inez Collier is a 89 year old female with a history of hypertension, hyperlipidemia, previous TIA and left carotid artery stenosis, infrarenal AAA, CKD stage 5 not on dialysis, generalized anxiety, COPD not on home oxygen, admitted on 9/14/2023 with nausea, vomiting,, generalized weakness complicated by hypoxia with a possible pneumonia.  On admission, she states that she has had  7-10 days of feeling poorly, she's been having periodic lightheadedness mostly like she is dizzy but had not had any 0 vomiting with it over the past 6 weeks or so.  Over the past few days she's been having nausea and vomiting with occasional diarrhea with some blood clots.   She denies any fevers/chills.  A daughter at a family reunion had similar symptoms about 10 days ago.  She had had a junky but non productive cough for the past few days as well.  She denies any aspiration event.  She's been trying to drink fluids but often it just comes right back up.  She's also seemed a bit confused over the past few days as well   Patient with increasing oxygen need, patient with shortness of breath now requiring 5 L supplemental O2   Summary of Stay: Inez Collier is a with a history of hypertension, hyperlipidemia, previous TIA and left carotid artery stenosis, infrarenal AAA, CKD stage 5 not on dialysis, generalized anxiety, COPD not on home oxygen, admitted on 9/14/2023 with nausea, vomiting,, generalized weakness complicated by hypoxia with a possible pneumonia vs volume overload related to worsening renal failure     Acute on chronic respiratory failure with increasing O2 needs  -Worsening leukocytosis  -Chest x-ray showed evidence of multifocal pneumonia.  -Will continue ceftriaxone  -Wean supplemental O2 as able  -Discharge to home with hospice  tomorrow    Code Status: No CPR- Do NOT Intubate    Disposition: Plan is discharged to home with hospice tomorrow  Total time spent in direct patient care more than 35 minutes.      Interval History   Patient complaining of abdominal pain.  Denies shortness of breath.  Has no vomiting.  Denies fever.  Also denies bloody stool, dysuria, urgency or frequency    -Data reviewed today: I reviewed all new labs and imaging reports over the last 24 hours. I personally reviewed the chest x-ray image(s) showing ordered pending .    Physical Exam   Temp: 97.6  F (36.4  C) Temp src: Oral BP: 116/65 Pulse: 70   Resp: 20 SpO2: 93 % O2 Device: Nasal cannula Oxygen Delivery: 4 LPM  Vitals:    09/17/23 1107 09/18/23 0449 09/18/23 0844   Weight: 52.2 kg (115 lb 1.3 oz) 51.2 kg (112 lb 14 oz) 50.9 kg (112 lb 3.4 oz)     Vital Signs with Ranges  Temp:  [97.3  F (36.3  C)-99.9  F (37.7  C)] 97.6  F (36.4  C)  Pulse:  [70-85] 70  Resp:  [20] 20  BP: ()/(53-77) 116/65  SpO2:  [88 %-97 %] 93 %  I/O last 3 completed shifts:  In: 240 [P.O.:240]  Out: 200 [Emesis/NG output:200]      GEN:  Alert, oriented x 3, tachypneic   HEENT:  Normocephalic/atraumatic, no scleral icterus, no nasal discharge, mouth dry.  CV:  Regular rate and rhythm, no murmur or JVD.  S1 + S2 noted, no S3 or S4.  LUNGS: Scattered crackles.  Bilateral air entry  ABD:  Active bowel sounds, soft, non-tender/non-distended.  No rebound/guarding/rigidity.  EXT:  No edema.  No cyanosis.  No joint synovitis noted.  SKIN:  Dry to touch, no exanthems noted in the visualized areas.    Medications      albuterol  2 puff Inhalation Q6H    carvedilol  12.5 mg Oral BID w/meals    cefTRIAXone  1 g Intravenous Q24H    ferrous sulfate  325 mg Oral Daily with breakfast    fluticasone  1 puff Inhalation Daily    gabapentin  100 mg Oral BID    pantoprazole  40 mg Oral QAM AC    sertraline  150 mg Oral QAM    sodium chloride (PF)  3 mL Intracatheter Q8H     Data     Recent Labs   Lab  09/18/23  0559 09/15/23  0608 09/14/23  0940   WBC 14.6* 12.9* 23.6*   HGB 8.8* 7.3* 9.4*   HCT 25.9* 21.4* 27.7*   MCV 86 87 86    175 245       Recent Labs   Lab 09/18/23  0559 09/16/23  0603 09/15/23  0608   * 135* 136   POTASSIUM 2.7* 2.9* 2.7*   CHLORIDE 91* 98 97*   CO2 29 26 24   ANIONGAP 15 11 15   * 111* 107*   BUN 58.3* 63.8* 68.5*   CR 4.53* 4.38* 4.69*   GFRESTIMATED 9* 9* 8*   RIGO 9.6 7.9* 8.0*         Recent Results (from the past 24 hour(s))   XR Chest Port 1 View    Narrative    EXAM: XR CHEST PORT 1 VIEW  LOCATION: [facility]  DATE: 9/18/2023    INDICATION: worsening O2 neeeds  COMPARISON: None.      Impression    IMPRESSION: The heart is mildly enlarged. Patchy multifocal airspace disease is seen in the right mid and lower lung zone concerning for multifocal pneumonia

## 2023-09-18 NOTE — PROGRESS NOTES
Per family pt had vomit around 4 pm, provider paged no new orders.  Pt is alert and oriented x4, forgetful. Pt is assist of 1 with transfers. Has bright blood on tissue after using bathroom. Pt is on 2L of O2, O2sat above 90. Pt ate dinner, pt c/o stomach pain, Maalox given, pt vomited small amt mostly hs meds and Maalox.

## 2023-09-19 NOTE — PROGRESS NOTES
Care Management Discharge Note    Discharge Date: 09/19/2023       Discharge Disposition: Hospice, Assisted Living    Discharge Services:      Discharge DME:      Discharge Transportation: agency    Private pay costs discussed: transportation costs    Education Provided on the Discharge Plan: Yes  Persons Notified of Discharge Plans: son, [facility], hospice  Patient/Family in Agreement with the Plan: yes    Additional Information:  Pt discharge home to [facility] with [facility]. JS Segura at [facility] returned SW call with a  confirming pt returning and orders needed.  Orders faxed to Rn at [facility], [phone], and hospice at [phone].     BRISEIDA Simmons, [facility]  Inpatient Care Coordination  [facility]  [phone]

## 2023-09-19 NOTE — PROGRESS NOTES
"[facility]     Date: 9/18/23  Admission Dx: PNA  Pulmonary History COPD/Asthma  Home Nebulizer/MDI Use: Albuterol neb/MDI PRN  Home Oxygen: None  Acuity Level (RCAT flow sheet): 4  Aerosol Therapy initiated: Albuterol MDI Q6 PRN      Pulmonary Hygiene initiated: NA      Volume Expansion initiated: NA      Current Oxygen Requirements: 4L NC  Current SpO2: 96%    Re-evaluation date: 9/21/23     Patient Education: Completed      See \"RT Assessments\" flow sheet for patient assessment scoring and Acuity Level Details.        Michael Morel, RT on 9/18/2023 at 8:17 PM   "

## 2023-09-19 NOTE — PLAN OF CARE
Goal Outcome Evaluation:      Plan of Care Reviewed With: patient      Alert, disoriented to time and place, forgetful. Tolerating 4lpm oxymask, Sat 93%. Reports of abdominal pain, Oxy x1 given. Denies sob, n/v. LS diminished, dyspnea noted when ambulated to bedside commode. SL. Bruising on forearm. Pt on ceftriaxone antibiotics. Ax1 gaitblet and walker. Plan:  Wean O2, antibiotics. Probably discharge home today with hospice.